# Patient Record
Sex: FEMALE | Race: OTHER | Employment: FULL TIME | URBAN - METROPOLITAN AREA
[De-identification: names, ages, dates, MRNs, and addresses within clinical notes are randomized per-mention and may not be internally consistent; named-entity substitution may affect disease eponyms.]

---

## 2017-02-05 DIAGNOSIS — E78.2 MIXED HYPERLIPIDEMIA: Primary | ICD-10-CM

## 2017-02-05 RX ORDER — OMEGA-3-ACID ETHYL ESTERS 1 G/1
2 CAPSULE, LIQUID FILLED ORAL 2 TIMES DAILY
Qty: 360 CAPSULE | Refills: 1 | Status: SHIPPED | OUTPATIENT
Start: 2017-02-05 | End: 2017-02-06 | Stop reason: SDUPTHER

## 2017-02-06 RX ORDER — OMEGA-3-ACID ETHYL ESTERS 1 G/1
2 CAPSULE, LIQUID FILLED ORAL 2 TIMES DAILY
Qty: 360 CAPSULE | Refills: 1 | Status: SHIPPED | OUTPATIENT
Start: 2017-02-06 | End: 2017-08-09 | Stop reason: SDUPTHER

## 2017-03-02 ENCOUNTER — TELEPHONE (OUTPATIENT)
Dept: FAMILY MEDICINE CLINIC | Age: 62
End: 2017-03-02

## 2017-03-02 DIAGNOSIS — E78.2 MIXED HYPERLIPIDEMIA: Primary | ICD-10-CM

## 2017-03-02 RX ORDER — PRAVASTATIN SODIUM 20 MG
20 TABLET ORAL EVERY EVENING
Qty: 90 TABLET | Refills: 1 | Status: SHIPPED | OUTPATIENT
Start: 2017-03-02 | End: 2017-08-25 | Stop reason: SDUPTHER

## 2017-03-28 ENCOUNTER — HOSPITAL ENCOUNTER (OUTPATIENT)
Dept: OTHER | Age: 62
Discharge: HOME | End: 2017-03-28
Attending: FAMILY MEDICINE | Admitting: FAMILY MEDICINE

## 2017-03-28 ENCOUNTER — OFFICE VISIT (OUTPATIENT)
Dept: FAMILY MEDICINE CLINIC | Age: 62
End: 2017-03-28

## 2017-03-28 VITALS
BODY MASS INDEX: 21.28 KG/M2 | HEIGHT: 67 IN | SYSTOLIC BLOOD PRESSURE: 100 MMHG | TEMPERATURE: 97.8 F | WEIGHT: 135.6 LBS | HEART RATE: 94 BPM | DIASTOLIC BLOOD PRESSURE: 68 MMHG | OXYGEN SATURATION: 95 %

## 2017-03-28 DIAGNOSIS — S89.91XA KNEE INJURY, RIGHT, INITIAL ENCOUNTER: Primary | ICD-10-CM

## 2017-03-28 DIAGNOSIS — S89.91XA KNEE INJURY, RIGHT, INITIAL ENCOUNTER: ICD-10-CM

## 2017-03-28 PROCEDURE — 99213 OFFICE O/P EST LOW 20 MIN: CPT | Performed by: NURSE PRACTITIONER

## 2017-03-28 RX ORDER — DOCUSATE SODIUM 100 MG/1
100 CAPSULE, LIQUID FILLED ORAL 2 TIMES DAILY
COMMUNITY

## 2017-03-28 ASSESSMENT — ENCOUNTER SYMPTOMS
RESPIRATORY NEGATIVE: 1
GASTROINTESTINAL NEGATIVE: 1
ALLERGIC/IMMUNOLOGIC NEGATIVE: 1
EYES NEGATIVE: 1

## 2017-04-24 DIAGNOSIS — E78.5 HYPERLIPIDEMIA, UNSPECIFIED HYPERLIPIDEMIA TYPE: ICD-10-CM

## 2017-04-24 DIAGNOSIS — E11.9 TYPE 2 DIABETES MELLITUS WITHOUT COMPLICATION, UNSPECIFIED LONG TERM INSULIN USE STATUS: ICD-10-CM

## 2017-04-24 LAB
ALBUMIN SERPL-MCNC: 4.1 G/DL (ref 3.4–5)
ANION GAP SERPL CALCULATED.3IONS-SCNC: 12 MMOL/L (ref 3–16)
BUN BLDV-MCNC: 18 MG/DL (ref 7–20)
CALCIUM SERPL-MCNC: 9 MG/DL (ref 8.3–10.6)
CHLORIDE BLD-SCNC: 102 MMOL/L (ref 99–110)
CHOLESTEROL, TOTAL: 242 MG/DL (ref 0–199)
CO2: 29 MMOL/L (ref 21–32)
CREAT SERPL-MCNC: <0.5 MG/DL (ref 0.6–1.1)
GFR AFRICAN AMERICAN: >60
GFR NON-AFRICAN AMERICAN: >60
GLUCOSE BLD-MCNC: 108 MG/DL (ref 70–99)
HDLC SERPL-MCNC: 100 MG/DL (ref 40–60)
LDL CHOLESTEROL CALCULATED: 123 MG/DL
PHOSPHORUS: 3.9 MG/DL (ref 2.5–4.9)
POTASSIUM SERPL-SCNC: 5.2 MMOL/L (ref 3.5–5.1)
SODIUM BLD-SCNC: 143 MMOL/L (ref 136–145)
TRIGL SERPL-MCNC: 93 MG/DL (ref 0–150)
VLDLC SERPL CALC-MCNC: 19 MG/DL

## 2017-04-25 LAB
ESTIMATED AVERAGE GLUCOSE: 116.9 MG/DL
HBA1C MFR BLD: 5.7 %

## 2017-04-26 ENCOUNTER — OFFICE VISIT (OUTPATIENT)
Dept: FAMILY MEDICINE CLINIC | Age: 62
End: 2017-04-26

## 2017-04-26 VITALS
TEMPERATURE: 98.1 F | WEIGHT: 138 LBS | BODY MASS INDEX: 21.66 KG/M2 | DIASTOLIC BLOOD PRESSURE: 70 MMHG | HEIGHT: 67 IN | SYSTOLIC BLOOD PRESSURE: 106 MMHG

## 2017-04-26 DIAGNOSIS — E11.9 TYPE 2 DIABETES MELLITUS WITHOUT COMPLICATION, WITHOUT LONG-TERM CURRENT USE OF INSULIN (HCC): ICD-10-CM

## 2017-04-26 DIAGNOSIS — E55.9 VITAMIN D DEFICIENCY: ICD-10-CM

## 2017-04-26 DIAGNOSIS — E78.2 MIXED HYPERLIPIDEMIA: ICD-10-CM

## 2017-04-26 DIAGNOSIS — M17.0 PRIMARY OSTEOARTHRITIS OF BOTH KNEES: Primary | ICD-10-CM

## 2017-04-26 PROCEDURE — 99214 OFFICE O/P EST MOD 30 MIN: CPT | Performed by: FAMILY MEDICINE

## 2017-04-26 ASSESSMENT — PATIENT HEALTH QUESTIONNAIRE - PHQ9
SUM OF ALL RESPONSES TO PHQ QUESTIONS 1-9: 0
2. FEELING DOWN, DEPRESSED OR HOPELESS: 0
1. LITTLE INTEREST OR PLEASURE IN DOING THINGS: 0
SUM OF ALL RESPONSES TO PHQ9 QUESTIONS 1 & 2: 0

## 2017-04-26 ASSESSMENT — ENCOUNTER SYMPTOMS
GASTROINTESTINAL NEGATIVE: 1
RESPIRATORY NEGATIVE: 1
EYES NEGATIVE: 1

## 2017-07-10 ENCOUNTER — OFFICE VISIT (OUTPATIENT)
Dept: GYNECOLOGY | Age: 62
End: 2017-07-10

## 2017-07-10 VITALS
DIASTOLIC BLOOD PRESSURE: 77 MMHG | BODY MASS INDEX: 23.14 KG/M2 | TEMPERATURE: 98 F | HEART RATE: 73 BPM | HEIGHT: 66 IN | WEIGHT: 144 LBS | SYSTOLIC BLOOD PRESSURE: 131 MMHG | RESPIRATION RATE: 17 BRPM

## 2017-07-10 DIAGNOSIS — Q51.28 DIDELPHIC UTERUS: ICD-10-CM

## 2017-07-10 DIAGNOSIS — N95.2 VAGINAL ATROPHY: ICD-10-CM

## 2017-07-10 DIAGNOSIS — Z01.419 WELL WOMAN EXAM WITH ROUTINE GYNECOLOGICAL EXAM: Primary | ICD-10-CM

## 2017-07-10 PROCEDURE — 99396 PREV VISIT EST AGE 40-64: CPT | Performed by: OBSTETRICS & GYNECOLOGY

## 2017-07-10 RX ORDER — ESTRADIOL 10 UG/1
10 INSERT VAGINAL
Qty: 24 TABLET | Refills: 3 | Status: SHIPPED | OUTPATIENT
Start: 2017-07-10 | End: 2018-04-20 | Stop reason: SDUPTHER

## 2017-07-10 ASSESSMENT — ENCOUNTER SYMPTOMS
RESPIRATORY NEGATIVE: 1
GASTROINTESTINAL NEGATIVE: 1
EYES NEGATIVE: 1

## 2017-07-12 LAB
HPV COMMENT: NORMAL
HPV TYPE 16: NOT DETECTED
HPV TYPE 18: NOT DETECTED
HPVOH (OTHER TYPES): NOT DETECTED

## 2017-08-09 DIAGNOSIS — E78.2 MIXED HYPERLIPIDEMIA: ICD-10-CM

## 2017-08-09 RX ORDER — OMEGA-3-ACID ETHYL ESTERS 1 G/1
CAPSULE, LIQUID FILLED ORAL
Qty: 360 CAPSULE | Refills: 1 | Status: SHIPPED | OUTPATIENT
Start: 2017-08-09 | End: 2018-03-23 | Stop reason: SDUPTHER

## 2017-08-25 DIAGNOSIS — E78.2 MIXED HYPERLIPIDEMIA: ICD-10-CM

## 2017-08-25 RX ORDER — PRAVASTATIN SODIUM 20 MG
20 TABLET ORAL DAILY
Qty: 90 TABLET | Refills: 1 | Status: SHIPPED | OUTPATIENT
Start: 2017-08-25 | End: 2018-03-04 | Stop reason: SDUPTHER

## 2017-08-28 DIAGNOSIS — E78.2 MIXED HYPERLIPIDEMIA: ICD-10-CM

## 2017-08-29 LAB
A/G RATIO: 2.2 (ref 1.1–2.2)
ALBUMIN SERPL-MCNC: 4.1 G/DL (ref 3.4–5)
ALP BLD-CCNC: 42 U/L (ref 40–129)
ALT SERPL-CCNC: 17 U/L (ref 10–40)
ANION GAP SERPL CALCULATED.3IONS-SCNC: 13 MMOL/L (ref 3–16)
AST SERPL-CCNC: 14 U/L (ref 15–37)
BILIRUB SERPL-MCNC: 0.4 MG/DL (ref 0–1)
BUN BLDV-MCNC: 23 MG/DL (ref 7–20)
CALCIUM SERPL-MCNC: 9.2 MG/DL (ref 8.3–10.6)
CHLORIDE BLD-SCNC: 97 MMOL/L (ref 99–110)
CO2: 29 MMOL/L (ref 21–32)
CREAT SERPL-MCNC: 0.5 MG/DL (ref 0.6–1.1)
GFR AFRICAN AMERICAN: >60
GFR NON-AFRICAN AMERICAN: >60
GLOBULIN: 1.9 G/DL
GLUCOSE BLD-MCNC: 106 MG/DL (ref 70–99)
POTASSIUM SERPL-SCNC: 4.3 MMOL/L (ref 3.5–5.1)
SODIUM BLD-SCNC: 139 MMOL/L (ref 136–145)
TOTAL PROTEIN: 6 G/DL (ref 6.4–8.2)

## 2017-09-13 ENCOUNTER — OFFICE VISIT (OUTPATIENT)
Dept: FAMILY MEDICINE CLINIC | Age: 62
End: 2017-09-13

## 2017-09-13 VITALS
SYSTOLIC BLOOD PRESSURE: 132 MMHG | HEIGHT: 67 IN | TEMPERATURE: 97.4 F | WEIGHT: 137.6 LBS | HEART RATE: 97 BPM | OXYGEN SATURATION: 98 % | BODY MASS INDEX: 21.6 KG/M2 | DIASTOLIC BLOOD PRESSURE: 76 MMHG

## 2017-09-13 DIAGNOSIS — K21.9 GASTROESOPHAGEAL REFLUX DISEASE, ESOPHAGITIS PRESENCE NOT SPECIFIED: Primary | ICD-10-CM

## 2017-09-13 DIAGNOSIS — E55.9 VITAMIN D DEFICIENCY: ICD-10-CM

## 2017-09-13 DIAGNOSIS — E78.2 MIXED HYPERLIPIDEMIA: ICD-10-CM

## 2017-09-13 DIAGNOSIS — E11.9 TYPE 2 DIABETES MELLITUS WITHOUT COMPLICATION, WITHOUT LONG-TERM CURRENT USE OF INSULIN (HCC): ICD-10-CM

## 2017-09-13 PROCEDURE — 99214 OFFICE O/P EST MOD 30 MIN: CPT | Performed by: NURSE PRACTITIONER

## 2017-09-13 RX ORDER — OMEPRAZOLE 20 MG/1
20 CAPSULE, DELAYED RELEASE ORAL DAILY
COMMUNITY

## 2017-09-13 ASSESSMENT — ENCOUNTER SYMPTOMS
STRIDOR: 0
HOARSE VOICE: 0
RESPIRATORY NEGATIVE: 1
CHOKING: 0
SORE THROAT: 0
WATER BRASH: 0
ABDOMINAL PAIN: 0
HEARTBURN: 1
NAUSEA: 0
ALLERGIC/IMMUNOLOGIC NEGATIVE: 1
GLOBUS SENSATION: 0
SHORTNESS OF BREATH: 0
BELCHING: 1
EYES NEGATIVE: 1
WHEEZING: 0
COUGH: 0

## 2017-09-25 ENCOUNTER — OFFICE VISIT (OUTPATIENT)
Dept: GYNECOLOGY | Age: 62
End: 2017-09-25

## 2017-09-25 VITALS
HEIGHT: 67 IN | WEIGHT: 136.6 LBS | SYSTOLIC BLOOD PRESSURE: 101 MMHG | HEART RATE: 67 BPM | DIASTOLIC BLOOD PRESSURE: 70 MMHG | BODY MASS INDEX: 21.44 KG/M2 | RESPIRATION RATE: 17 BRPM | TEMPERATURE: 97.1 F

## 2017-09-25 DIAGNOSIS — R10.2 PELVIC PAIN IN FEMALE: Primary | ICD-10-CM

## 2017-09-25 DIAGNOSIS — M62.838 LEVATOR SPASM: ICD-10-CM

## 2017-09-25 PROCEDURE — 99213 OFFICE O/P EST LOW 20 MIN: CPT | Performed by: OBSTETRICS & GYNECOLOGY

## 2017-09-25 RX ORDER — CYCLOBENZAPRINE HCL 10 MG
10 TABLET ORAL 2 TIMES DAILY PRN
Qty: 40 TABLET | Refills: 1 | Status: SHIPPED | OUTPATIENT
Start: 2017-09-25 | End: 2017-10-05

## 2017-12-12 ENCOUNTER — HOSPITAL ENCOUNTER (OUTPATIENT)
Dept: MAMMOGRAPHY | Age: 62
Discharge: HOME | End: 2017-12-12
Attending: FAMILY MEDICINE | Admitting: FAMILY MEDICINE

## 2017-12-12 DIAGNOSIS — Z12.31 VISIT FOR SCREENING MAMMOGRAM: ICD-10-CM

## 2018-01-05 ENCOUNTER — TELEPHONE (OUTPATIENT)
Dept: FAMILY MEDICINE CLINIC | Age: 63
End: 2018-01-05

## 2018-01-16 ENCOUNTER — OFFICE VISIT (OUTPATIENT)
Dept: FAMILY MEDICINE CLINIC | Age: 63
End: 2018-01-16

## 2018-01-16 VITALS
DIASTOLIC BLOOD PRESSURE: 78 MMHG | WEIGHT: 133 LBS | BODY MASS INDEX: 20.83 KG/M2 | TEMPERATURE: 97.5 F | SYSTOLIC BLOOD PRESSURE: 124 MMHG

## 2018-01-16 DIAGNOSIS — Z23 NEED FOR PNEUMOCOCCAL VACCINATION: ICD-10-CM

## 2018-01-16 DIAGNOSIS — E78.2 MIXED HYPERLIPIDEMIA: ICD-10-CM

## 2018-01-16 DIAGNOSIS — E11.9 TYPE 2 DIABETES MELLITUS WITHOUT COMPLICATION, WITHOUT LONG-TERM CURRENT USE OF INSULIN (HCC): ICD-10-CM

## 2018-01-16 DIAGNOSIS — E55.9 VITAMIN D DEFICIENCY: ICD-10-CM

## 2018-01-16 DIAGNOSIS — E11.9 TYPE 2 DIABETES MELLITUS WITHOUT COMPLICATION, WITHOUT LONG-TERM CURRENT USE OF INSULIN (HCC): Primary | ICD-10-CM

## 2018-01-16 LAB
ALBUMIN SERPL-MCNC: 4.6 G/DL (ref 3.4–5)
ANION GAP SERPL CALCULATED.3IONS-SCNC: 12 MMOL/L (ref 3–16)
BILIRUBIN, POC: NORMAL
BLOOD URINE, POC: NORMAL
BUN BLDV-MCNC: 26 MG/DL (ref 7–20)
CALCIUM SERPL-MCNC: 9.5 MG/DL (ref 8.3–10.6)
CHLORIDE BLD-SCNC: 98 MMOL/L (ref 99–110)
CHOLESTEROL, TOTAL: 223 MG/DL (ref 0–199)
CLARITY, POC: CLEAR
CO2: 30 MMOL/L (ref 21–32)
COLOR, POC: YELLOW
CREAT SERPL-MCNC: 0.5 MG/DL (ref 0.6–1.2)
CREATININE URINE POCT: NORMAL
ESTIMATED AVERAGE GLUCOSE: 119.8 MG/DL
GFR AFRICAN AMERICAN: >60
GFR NON-AFRICAN AMERICAN: >60
GLUCOSE BLD-MCNC: 93 MG/DL (ref 70–99)
GLUCOSE URINE, POC: NORMAL
HBA1C MFR BLD: 5.8 %
HDLC SERPL-MCNC: 94 MG/DL (ref 40–60)
KETONES, POC: NORMAL
LDL CHOLESTEROL CALCULATED: 118 MG/DL
LEUKOCYTE EST, POC: NORMAL
MICROALBUMIN/CREAT 24H UR: NORMAL MG/G{CREAT}
MICROALBUMIN/CREAT UR-RTO: NORMAL
NITRITE, POC: NORMAL
PH, POC: 7
PHOSPHORUS: 4.3 MG/DL (ref 2.5–4.9)
POTASSIUM SERPL-SCNC: 4.1 MMOL/L (ref 3.5–5.1)
PROTEIN, POC: NORMAL
SODIUM BLD-SCNC: 140 MMOL/L (ref 136–145)
SPECIFIC GRAVITY, POC: 1.01
TRIGL SERPL-MCNC: 55 MG/DL (ref 0–150)
UROBILINOGEN, POC: 0.2
VITAMIN D 25-HYDROXY: 51.3 NG/ML
VLDLC SERPL CALC-MCNC: 11 MG/DL

## 2018-01-16 PROCEDURE — 82044 UR ALBUMIN SEMIQUANTITATIVE: CPT | Performed by: FAMILY MEDICINE

## 2018-01-16 PROCEDURE — 81002 URINALYSIS NONAUTO W/O SCOPE: CPT | Performed by: FAMILY MEDICINE

## 2018-01-16 PROCEDURE — 90732 PPSV23 VACC 2 YRS+ SUBQ/IM: CPT | Performed by: FAMILY MEDICINE

## 2018-01-16 PROCEDURE — 90471 IMMUNIZATION ADMIN: CPT | Performed by: FAMILY MEDICINE

## 2018-01-16 PROCEDURE — 99214 OFFICE O/P EST MOD 30 MIN: CPT | Performed by: FAMILY MEDICINE

## 2018-01-16 ASSESSMENT — ENCOUNTER SYMPTOMS
GASTROINTESTINAL NEGATIVE: 1
RESPIRATORY NEGATIVE: 1
EYES NEGATIVE: 1

## 2018-01-17 NOTE — PROGRESS NOTES
Diagnosis Date    Acne rosacea 2011    Anxiety     Diabetes [facility]     Diabetic eye exam ([facility] .9.15    [facility]    Diabetic eye exam ([facility] 16    [facility]     Diverticulosis 2013    Hyperlipidemia     Insufficiency of tear film of both eyes 2016    Nuclear sclerosis of both eyes 2016    Pneumothorax     Pregnancy     grava - 2, para - 2, uterine rupture with stillbirth    Uterus didelphus      Past Surgical History:   Procedure Laterality Date     SECTION      2    COLONOSCOPY      WISDOM TOOTH EXTRACTION      x 4     Family History   Problem Relation Age of Onset    Stroke Mother     Glaucoma Mother     Cataracts Mother     High Cholesterol Mother     High Blood Pressure Mother     Heart Disease Mother     High Blood Pressure Father     Heart Disease Paternal Uncle     Cancer Paternal Uncle     Cancer Other     Stroke Maternal Uncle     Heart Disease Maternal Uncle     Mental Illness Maternal Grandmother     No Known Problems Sister     No Known Problems Brother     No Known Problems Maternal Aunt     No Known Problems Paternal Aunt     No Known Problems Maternal Grandfather     No Known Problems Paternal Grandmother     No Known Problems Paternal Grandfather     Rheum Arthritis Neg Hx     Osteoarthritis Neg Hx     Asthma Neg Hx     Breast Cancer Neg Hx     Diabetes Neg Hx     Heart Failure Neg Hx     Hypertension Neg Hx     Migraines Neg Hx     Ovarian Cancer Neg Hx     Rashes/Skin Problems Neg Hx     Seizures Neg Hx     Thyroid Disease Neg Hx      Social History     Social History    Marital status:      Spouse name: N/A    Number of children: N/A    Years of education: N/A     Occupational History    Not on file.      Social History Main Topics    Smoking status: Never Smoker    Smokeless tobacco: Never Used    Alcohol use No    Drug use: No    Sexual activity: Yes     Partners: Male     Other Topics Concern    Not on file     Social History Narrative    No narrative on file         Any recent diagnostic tests, hospital reports, office notes or consultation letters were reviewed prior to and during the visit. Review of Systems   Constitutional: Negative. HENT: Negative. Eyes: Negative. Respiratory: Negative. Cardiovascular: Negative. Gastrointestinal: Negative. Genitourinary: Negative. Musculoskeletal: Negative. Psychiatric/Behavioral: Negative. Physical Exam   Constitutional: She is oriented to person, place, and time. She appears well-developed and well-nourished. No distress. Neck: Normal range of motion. Neck supple. Normal carotid pulses, no hepatojugular reflux and no JVD present. Carotid bruit is not present. No tracheal deviation present. No thyromegaly present. Cardiovascular: Normal rate, regular rhythm, S1 normal, S2 normal, normal heart sounds and intact distal pulses. Exam reveals no gallop and no friction rub. No murmur heard. Pulses:       Carotid pulses are 2+ on the right side, and 2+ on the left side. Radial pulses are 2+ on the right side, and 2+ on the left side. Femoral pulses are 2+ on the right side, and 2+ on the left side. Popliteal pulses are 2+ on the right side, and 2+ on the left side. Dorsalis pedis pulses are 2+ on the right side, and 2+ on the left side. Posterior tibial pulses are 2+ on the right side, and 2+ on the left side. Pulmonary/Chest: Effort normal and breath sounds normal. No accessory muscle usage or stridor. No respiratory distress. She has no decreased breath sounds. She has no wheezes. She has no rhonchi. She has no rales. She exhibits no tenderness. Abdominal: Soft. Bowel sounds are normal. She exhibits no shifting dullness, no distension, no pulsatile liver, no abdominal bruit and no mass. There is no hepatosplenomegaly. There is no tenderness.  There is no rebound and no guarding. No hernia. Musculoskeletal:        Right ankle: She exhibits no swelling. Left ankle: She exhibits no swelling. Right foot: There is normal range of motion, no tenderness, no bony tenderness, no swelling, no crepitus and no deformity. Left foot: There is normal range of motion, no tenderness, no bony tenderness, no swelling and no deformity. Lymphadenopathy:     She has no cervical adenopathy. Neurological: She is oriented to person, place, and time. No sensory deficit. Filament test felt   Skin: She is not diaphoretic. Psychiatric: She has a normal mood and affect. Her behavior is normal. Judgment and thought content normal.         1. Type 2 diabetes mellitus without complication, without long-term current use of insulin (HCC)  Condition stable continue the medications, treatments, will check labs as appropriate         The patient received counseling on the following healthy behaviors: nutrition, exercise, medication adherence and diabetes control. Patient given educational materials on Diabetes as appropriate. I have instructed the patient to complete a self tracking handout on Blood Sugars  and instructed them to bring it with them to the next appointment. Discussed use, benefit, and side effects of prescribed medications. Barriers to medication compliance addressed. All patient questions answered. Pt voiced understanding. POCT microalbumin    POCT Urinalysis no Micro    [facility] DIABETES FOOT EXAM   2.  Mixed hyperlipidemia  Condition stable continue the medications, treatments, will check labs as appropriate       The patient received counseling on the following healthy behaviors: nutrition, exercise, medication adherence and decrease in alcohol consumption    Patient given educational materials on Hyperlipidemia and Nutrition if appropriate    I have instructed the patient to complete a self tracking handout on diet and instructed them to bring it with them

## 2018-03-04 DIAGNOSIS — E78.2 MIXED HYPERLIPIDEMIA: ICD-10-CM

## 2018-03-04 RX ORDER — PRAVASTATIN SODIUM 20 MG
20 TABLET ORAL DAILY
Qty: 90 TABLET | Refills: 1 | Status: SHIPPED | OUTPATIENT
Start: 2018-03-04 | End: 2018-09-14 | Stop reason: SDUPTHER

## 2018-03-23 DIAGNOSIS — E78.2 MIXED HYPERLIPIDEMIA: ICD-10-CM

## 2018-03-23 RX ORDER — OMEGA-3-ACID ETHYL ESTERS 1 G/1
2 CAPSULE, LIQUID FILLED ORAL 2 TIMES DAILY
Qty: 360 CAPSULE | Refills: 1 | Status: SHIPPED | OUTPATIENT
Start: 2018-03-23 | End: 2018-08-28 | Stop reason: SDUPTHER

## 2018-03-28 ENCOUNTER — OFFICE VISIT (OUTPATIENT)
Dept: FAMILY MEDICINE CLINIC | Age: 63
End: 2018-03-28

## 2018-03-28 VITALS
DIASTOLIC BLOOD PRESSURE: 70 MMHG | BODY MASS INDEX: 20.94 KG/M2 | HEIGHT: 67 IN | WEIGHT: 133.4 LBS | TEMPERATURE: 98.6 F | OXYGEN SATURATION: 98 % | SYSTOLIC BLOOD PRESSURE: 110 MMHG

## 2018-03-28 DIAGNOSIS — G47.26 SHIFT WORK SLEEP DISORDER: Primary | ICD-10-CM

## 2018-03-28 DIAGNOSIS — E78.2 MIXED HYPERLIPIDEMIA: ICD-10-CM

## 2018-03-28 DIAGNOSIS — E11.9 TYPE 2 DIABETES MELLITUS WITHOUT COMPLICATION, WITHOUT LONG-TERM CURRENT USE OF INSULIN (HCC): ICD-10-CM

## 2018-03-28 DIAGNOSIS — Z78.0 MENOPAUSE: ICD-10-CM

## 2018-03-28 PROCEDURE — 99214 OFFICE O/P EST MOD 30 MIN: CPT | Performed by: FAMILY MEDICINE

## 2018-03-28 ASSESSMENT — ENCOUNTER SYMPTOMS
GASTROINTESTINAL NEGATIVE: 1
EYES NEGATIVE: 1
RESPIRATORY NEGATIVE: 1

## 2018-03-28 NOTE — PROGRESS NOTES
History:   Diagnosis Date    Acne rosacea 2011    Anxiety     Diabetes mellitus ([facility]     Diabetic eye exam ([facility] [address] 4.9.15    [facility]    Diabetic eye exam ([facility] [address] [facility] 16    [facility]     Diverticulosis 2013    Hyperlipidemia     Insufficiency of tear film of both eyes 2016    Nuclear sclerosis of both eyes 2016    Pneumothorax     Pregnancy     grava - 2, para - 2, uterine rupture with stillbirth    Uterus didelphus      Past Surgical History:   Procedure Laterality Date     SECTION      2    COLONOSCOPY      WISDOM TOOTH EXTRACTION      x 4     Family History   Problem Relation Age of Onset    Stroke Mother     Glaucoma Mother     Cataracts Mother     High Cholesterol Mother     High Blood Pressure Mother     Heart Disease Mother     High Blood Pressure Father     Heart Disease Paternal Uncle     Cancer Paternal Uncle     Cancer Other     Stroke Maternal Uncle     Heart Disease Maternal Uncle     Mental Illness Maternal Grandmother     No Known Problems Sister     No Known Problems Brother     No Known Problems Maternal Aunt     No Known Problems Paternal Aunt     No Known Problems Maternal Grandfather     No Known Problems Paternal Grandmother     No Known Problems Paternal Grandfather     Rheum Arthritis Neg Hx     Osteoarthritis Neg Hx     Asthma Neg Hx     Breast Cancer Neg Hx     Diabetes Neg Hx     Heart Failure Neg Hx     Hypertension Neg Hx     Migraines Neg Hx     Ovarian Cancer Neg Hx     Rashes/Skin Problems Neg Hx     Seizures Neg Hx     Thyroid Disease Neg Hx      Social History     Social History    Marital status:      Spouse name: N/A    Number of children: N/A    Years of education: N/A     Occupational History    Not on file.      Social History Main Topics    Smoking status: Never Smoker    Smokeless tobacco: Never Used    Alcohol use No    Drug use: No    Sexual activity: Yes     Partners: Male

## 2018-04-09 ENCOUNTER — EMPLOYEE WELLNESS (OUTPATIENT)
Dept: OTHER | Age: 63
End: 2018-04-09

## 2018-04-09 LAB
CHOLESTEROL, TOTAL: 218 MG/DL (ref 0–199)
GLUCOSE BLD-MCNC: 94 MG/DL (ref 70–99)
HDLC SERPL-MCNC: 92 MG/DL (ref 40–60)
LDL CHOLESTEROL CALCULATED: 114 MG/DL
TRIGL SERPL-MCNC: 61 MG/DL (ref 0–150)

## 2018-04-16 VITALS — BODY MASS INDEX: 20.52 KG/M2 | WEIGHT: 131 LBS

## 2018-04-20 DIAGNOSIS — N95.2 VAGINAL ATROPHY: ICD-10-CM

## 2018-04-20 RX ORDER — ESTRADIOL 10 UG/1
10 INSERT VAGINAL
Qty: 24 TABLET | Refills: 3 | Status: SHIPPED | OUTPATIENT
Start: 2018-04-23 | End: 2018-08-28 | Stop reason: SDUPTHER

## 2018-04-30 ENCOUNTER — TELEPHONE (OUTPATIENT)
Dept: FAMILY MEDICINE CLINIC | Age: 63
End: 2018-04-30

## 2018-04-30 ENCOUNTER — NURSE TRIAGE (OUTPATIENT)
Dept: OTHER | Facility: CLINIC | Age: 63
End: 2018-04-30

## 2018-05-01 ENCOUNTER — OFFICE VISIT (OUTPATIENT)
Dept: FAMILY MEDICINE CLINIC | Age: 63
End: 2018-05-01

## 2018-05-01 ENCOUNTER — TELEPHONE (OUTPATIENT)
Dept: FAMILY MEDICINE CLINIC | Age: 63
End: 2018-05-01

## 2018-05-01 DIAGNOSIS — E11.9 TYPE 2 DIABETES MELLITUS WITHOUT COMPLICATION, WITHOUT LONG-TERM CURRENT USE OF INSULIN (HCC): ICD-10-CM

## 2018-05-01 DIAGNOSIS — R55 VASOVAGAL SYNCOPE: Primary | ICD-10-CM

## 2018-05-01 DIAGNOSIS — E87.6 HYPOKALEMIA: ICD-10-CM

## 2018-05-01 DIAGNOSIS — E83.42 HYPOMAGNESEMIA: ICD-10-CM

## 2018-05-01 PROCEDURE — 99213 OFFICE O/P EST LOW 20 MIN: CPT | Performed by: FAMILY MEDICINE

## 2018-05-01 RX ORDER — ONDANSETRON 4 MG/1
4 TABLET, FILM COATED ORAL EVERY 8 HOURS PRN
Qty: 30 TABLET | Refills: 1 | Status: SHIPPED | OUTPATIENT
Start: 2018-05-01 | End: 2020-05-07 | Stop reason: SDUPTHER

## 2018-05-01 ASSESSMENT — ENCOUNTER SYMPTOMS
EYES NEGATIVE: 1
GASTROINTESTINAL NEGATIVE: 1
RESPIRATORY NEGATIVE: 1

## 2018-05-02 LAB
ESTIMATED AVERAGE GLUCOSE: 111.2 MG/DL
HBA1C MFR BLD: 5.5 %
MAGNESIUM: 1.8 MG/DL (ref 1.8–2.4)
POTASSIUM SERPL-SCNC: 4.2 MMOL/L (ref 3.5–5.1)

## 2018-05-15 ENCOUNTER — OFFICE VISIT (OUTPATIENT)
Dept: FAMILY MEDICINE CLINIC | Age: 63
End: 2018-05-15

## 2018-05-15 VITALS
HEIGHT: 67 IN | DIASTOLIC BLOOD PRESSURE: 60 MMHG | HEART RATE: 80 BPM | OXYGEN SATURATION: 100 % | SYSTOLIC BLOOD PRESSURE: 110 MMHG | WEIGHT: 129.6 LBS | BODY MASS INDEX: 20.34 KG/M2 | TEMPERATURE: 98.6 F

## 2018-05-15 DIAGNOSIS — E78.2 MIXED HYPERLIPIDEMIA: Primary | ICD-10-CM

## 2018-05-15 DIAGNOSIS — E61.2 MAGNESIUM DEFICIENCY: ICD-10-CM

## 2018-05-15 PROBLEM — K21.9 GASTROESOPHAGEAL REFLUX DISEASE: Status: ACTIVE | Noted: 2018-05-15

## 2018-05-15 ASSESSMENT — ENCOUNTER SYMPTOMS
RESPIRATORY NEGATIVE: 1
SHORTNESS OF BREATH: 0
EYES NEGATIVE: 1
ALLERGIC/IMMUNOLOGIC NEGATIVE: 1
GASTROINTESTINAL NEGATIVE: 1

## 2018-06-20 DIAGNOSIS — Z12.11 ENCOUNTER FOR SCREENING COLONOSCOPY: Primary | ICD-10-CM

## 2018-08-16 ENCOUNTER — OFFICE VISIT (OUTPATIENT)
Dept: GYNECOLOGY | Age: 63
End: 2018-08-16

## 2018-08-16 VITALS
HEART RATE: 65 BPM | BODY MASS INDEX: 20.09 KG/M2 | DIASTOLIC BLOOD PRESSURE: 61 MMHG | WEIGHT: 128 LBS | RESPIRATION RATE: 17 BRPM | SYSTOLIC BLOOD PRESSURE: 110 MMHG | HEIGHT: 67 IN

## 2018-08-16 DIAGNOSIS — M85.88 OSTEOPENIA OF OTHER SITE: ICD-10-CM

## 2018-08-16 DIAGNOSIS — Z78.0 MENOPAUSE: ICD-10-CM

## 2018-08-16 DIAGNOSIS — Z01.419 WELL WOMAN EXAM WITH ROUTINE GYNECOLOGICAL EXAM: Primary | ICD-10-CM

## 2018-08-16 DIAGNOSIS — N95.2 VAGINAL ATROPHY: ICD-10-CM

## 2018-08-16 PROCEDURE — 99396 PREV VISIT EST AGE 40-64: CPT | Performed by: OBSTETRICS & GYNECOLOGY

## 2018-08-16 RX ORDER — ESTRADIOL 10 UG/1
10 INSERT VAGINAL NIGHTLY
Qty: 90 TABLET | Refills: 3 | Status: SHIPPED | OUTPATIENT
Start: 2018-08-16 | End: 2020-06-18 | Stop reason: SDUPTHER

## 2018-08-17 ASSESSMENT — ENCOUNTER SYMPTOMS
RESPIRATORY NEGATIVE: 1
EYES NEGATIVE: 1
GASTROINTESTINAL NEGATIVE: 1

## 2018-08-18 NOTE — PROGRESS NOTES
History Narrative    No narrative on file     No Known Allergies  Outpatient Prescriptions Marked as Taking for the 8/16/18 encounter (Office Visit) with Julio Valdivia MD   Medication Sig Dispense Refill    Estradiol (VAGIFEM) 10 MCG TABS vaginal tablet Place 1 tablet vaginally nightly 90 tablet 3    ondansetron (ZOFRAN) 4 MG tablet Take 1 tablet by mouth every 8 hours as needed for Nausea or Vomiting 30 tablet 1    Estradiol (VAGIFEM) 10 MCG TABS vaginal tablet Place 1 tablet vaginally Twice a Week 24 tablet 3    omega-3 acid ethyl esters (LOVAZA) 1 g capsule Take 2 capsules by mouth 2 times daily 360 capsule 1    pravastatin (PRAVACHOL) 20 MG tablet Take 1 tablet by mouth daily 90 tablet 1    Zoster Vac Recomb Adjuvanted (SHINGRIX) 50 MCG SUSR I dose im 1 each 1    omeprazole (PRILOSEC) 20 MG delayed release capsule Take 20 mg by mouth daily      docusate sodium (COLACE) 100 MG capsule Take 100 mg by mouth 2 times daily      Glucosamine-Chondroitin (GLUCOSAMINE CHONDR COMPLEX PO) Take by mouth      Cholecalciferol (VITAMIN D) 2000 UNITS CAPS capsule Take by mouth      Multiple Minerals-Vitamins (CALCIUM CITRATE PLUS PO) Take by mouth      Inulin (METAMUCIL CLEAR & NATURAL) POWD Take  by mouth.  MINOXIDIL, TOPICAL, 5 % SOLN Apply  topically.  naproxen (NAPROSYN) 250 MG tablet Take 500 mg by mouth 2 times daily (with meals)       Melatonin 3 MG TABS Take 3 mg by mouth daily.  Fiber Complete TABS Take 1 tablet by mouth Daily. [address] (TRI-BALANCE ORTHOT[employer] WOMENS) MISC by Does not apply route. As directed       multivitamin (THERAGRAN) per tablet Take 1 tablet by mouth daily.        Family History   Problem Relation Age of Onset    Stroke Mother     Glaucoma Mother     Cataracts Mother     High Cholesterol Mother     High Blood Pressure Mother     Heart Disease Mother     High Blood Pressure Father     Heart Disease Paternal Uncle     Cancer Paternal Uncle tenderness, lesion or injury on the right labia. There is no rash, tenderness, lesion or injury on the left labia. Uterus is not deviated, not enlarged, not fixed and not tender. Cervix exhibits no motion tenderness, no discharge and no friability. Right adnexum displays no mass, no tenderness and no fullness. Left adnexum displays no mass, no tenderness and no fullness. No erythema, tenderness or bleeding in the vagina. No foreign body in the vagina. No signs of injury around the vagina. No vaginal discharge found. Genitourinary Comments: Normal urethral meatus, nl urethra, nl bladder. Musculoskeletal: Normal range of motion. She exhibits no edema or tenderness. Lymphadenopathy:     She has no cervical adenopathy. Right: No inguinal adenopathy present. Left: No inguinal adenopathy present. Neurological: She is alert and oriented to person, place, and time. She has normal reflexes. Skin: Skin is warm and dry. No rash noted. She is not diaphoretic. No erythema. Psychiatric: She has a normal mood and affect. Her behavior is normal. Judgment and thought content normal.       Assessment:      1. Annual  2. Menopause  3. Vaginal atrophy  4. osteopenia      Plan:      1. Pap, calcium, exercise, mammogram, hemocult negative  2. See below  3. Vagifem  4.  Arslan London MD

## 2018-08-28 ENCOUNTER — OFFICE VISIT (OUTPATIENT)
Dept: FAMILY MEDICINE CLINIC | Age: 63
End: 2018-08-28

## 2018-08-28 VITALS
HEIGHT: 67 IN | SYSTOLIC BLOOD PRESSURE: 110 MMHG | WEIGHT: 128 LBS | BODY MASS INDEX: 20.09 KG/M2 | HEART RATE: 94 BPM | DIASTOLIC BLOOD PRESSURE: 78 MMHG | OXYGEN SATURATION: 97 %

## 2018-08-28 DIAGNOSIS — E78.2 MIXED HYPERLIPIDEMIA: Primary | ICD-10-CM

## 2018-08-28 DIAGNOSIS — E55.9 VITAMIN D DEFICIENCY: ICD-10-CM

## 2018-08-28 DIAGNOSIS — R73.03 PRE-DIABETES: ICD-10-CM

## 2018-08-28 PROCEDURE — 99214 OFFICE O/P EST MOD 30 MIN: CPT | Performed by: FAMILY MEDICINE

## 2018-08-28 RX ORDER — TRAZODONE HYDROCHLORIDE 50 MG/1
50 TABLET ORAL NIGHTLY PRN
Qty: 15 TABLET | Refills: 0 | Status: SHIPPED | OUTPATIENT
Start: 2018-08-28 | End: 2018-10-29 | Stop reason: SDUPTHER

## 2018-08-28 RX ORDER — OMEGA-3-ACID ETHYL ESTERS 1 G/1
2 CAPSULE, LIQUID FILLED ORAL 3 TIMES DAILY
Qty: 540 CAPSULE | Refills: 1 | Status: SHIPPED | OUTPATIENT
Start: 2018-08-28 | End: 2018-09-05 | Stop reason: SDUPTHER

## 2018-08-28 ASSESSMENT — PATIENT HEALTH QUESTIONNAIRE - PHQ9
SUM OF ALL RESPONSES TO PHQ QUESTIONS 1-9: 0
SUM OF ALL RESPONSES TO PHQ QUESTIONS 1-9: 0
1. LITTLE INTEREST OR PLEASURE IN DOING THINGS: 0
SUM OF ALL RESPONSES TO PHQ9 QUESTIONS 1 & 2: 0
2. FEELING DOWN, DEPRESSED OR HOPELESS: 0

## 2018-08-28 NOTE — PROGRESS NOTES
Mixed hyperlipidemia  Comprehensive Metabolic Panel    Lipid Panel    omega-3 acid ethyl esters (LOVAZA) 1 g capsule   2. Pre-diabetes  Hemoglobin A1C   3. Vitamin D deficiency  VITAMIN D 25 HYDROXY          Plan:      Nancy Ospina was seen today for new patient. Diagnoses and all orders for this visit:    Mixed hyperlipidemia  -     Comprehensive Metabolic Panel; Future  -     Lipid Panel; Future  -     omega-3 acid ethyl esters (LOVAZA) 1 g capsule; Take 2 capsules by mouth 3 times daily  Has been stable on current meds. Rechecking. Pre-diabetes  -     Hemoglobin A1C; Future  bloodwork ordered to make sure stable with diet and exercise  Vitamin D deficiency  -     VITAMIN D 25 HYDROXY; Future  Has been stable on otc. Other orders  -     traZODone (DESYREL) 50 MG tablet;  Take 1 tablet by mouth nightly as needed for Sleep (insomnia)             Sisi Kim MD

## 2018-08-29 ENCOUNTER — TELEPHONE (OUTPATIENT)
Dept: GYNECOLOGY | Age: 63
End: 2018-08-29

## 2018-08-30 DIAGNOSIS — E55.9 VITAMIN D DEFICIENCY: ICD-10-CM

## 2018-08-30 DIAGNOSIS — R73.03 PRE-DIABETES: ICD-10-CM

## 2018-08-30 DIAGNOSIS — E78.2 MIXED HYPERLIPIDEMIA: ICD-10-CM

## 2018-08-30 LAB
A/G RATIO: 2.1 (ref 1.1–2.2)
ALBUMIN SERPL-MCNC: 4.4 G/DL (ref 3.4–5)
ALP BLD-CCNC: 47 U/L (ref 40–129)
ALT SERPL-CCNC: 16 U/L (ref 10–40)
ANION GAP SERPL CALCULATED.3IONS-SCNC: 15 MMOL/L (ref 3–16)
AST SERPL-CCNC: 14 U/L (ref 15–37)
BILIRUB SERPL-MCNC: 0.4 MG/DL (ref 0–1)
BUN BLDV-MCNC: 26 MG/DL (ref 7–20)
CALCIUM SERPL-MCNC: 9.2 MG/DL (ref 8.3–10.6)
CHLORIDE BLD-SCNC: 96 MMOL/L (ref 99–110)
CHOLESTEROL, TOTAL: 213 MG/DL (ref 0–199)
CO2: 28 MMOL/L (ref 21–32)
CREAT SERPL-MCNC: 0.6 MG/DL (ref 0.6–1.2)
GFR AFRICAN AMERICAN: >60
GFR NON-AFRICAN AMERICAN: >60
GLOBULIN: 2.1 G/DL
GLUCOSE BLD-MCNC: 98 MG/DL (ref 70–99)
HDLC SERPL-MCNC: 81 MG/DL (ref 40–60)
LDL CHOLESTEROL CALCULATED: 119 MG/DL
POTASSIUM SERPL-SCNC: 4.1 MMOL/L (ref 3.5–5.1)
SODIUM BLD-SCNC: 139 MMOL/L (ref 136–145)
TOTAL PROTEIN: 6.5 G/DL (ref 6.4–8.2)
TRIGL SERPL-MCNC: 63 MG/DL (ref 0–150)
VITAMIN D 25-HYDROXY: 60.1 NG/ML
VLDLC SERPL CALC-MCNC: 13 MG/DL

## 2018-08-31 LAB
ESTIMATED AVERAGE GLUCOSE: 114 MG/DL
HBA1C MFR BLD: 5.6 %

## 2018-09-04 ENCOUNTER — TELEPHONE (OUTPATIENT)
Dept: FAMILY MEDICINE CLINIC | Age: 63
End: 2018-09-04

## 2018-09-04 NOTE — TELEPHONE ENCOUNTER
\"Due to triglycerides of 63 and the maximum dosage of this is 4 capsules per day, please confirm this dosage with Kim at [facility] mail order pharmacy\"    Please advise, current sig has patient taking 6 capsules a day. Thanks.

## 2018-09-04 NOTE — TELEPHONE ENCOUNTER
Medication   omega-3 acid ethyl esters (LOVAZA) 1 g capsule [[id number]]   omega-3 acid ethyl esters (LOVAZA) 1 g capsule [[id number]]   Order Details   Dose: 2 g Route: Oral Frequency: 3 TIMES DAILY   Dispense Quantity:  540 capsule Refills:  1 Fills remaining:  --           Sig: Take 2 capsules by mouth 3 times daily          Written Date:  18 Expiration Date:  19     Start Date:  18 End Date:  --     Ordering Provider:  -- Authorizing Provider:  Serena Bey MD Ordering User:  Serena Bey MD          Diagnosis Association: Mixed hyperlipidemia (E78.2)      Original Order:  omega-3 acid ethyl esters (LOVAZA) 1 g capsule [[id number]]      Pharmacy:  [address] [place] [address] [phone] - F [phone]      Pharmacy Comments:  --          Fill quantity remaining:  -- Fill quantity used:  --        Order Class:     Order Class   Normal [1]   Warnings History     Total number of overridden warnings: 1   Full Warnings History   Order Audit Trail     Number of times this order has been changed since signin   Order Audit Trail   omega-3 acid ethyl esters (LOVAZA) 1 g capsule   Date: 2018 Department: [facility] Ordering/Authorizing: Serena Bey MD   Medication Detail      Disp Refills Start End    omega-3 acid ethyl esters (LOVAZA) 1 g capsule 540 capsule 1 2018     Sig - Route:  Take 2 capsules by mouth 3 times daily - Oral    Sent to pharmacy as: Omega-3-acid Ethyl Esters 1 GM Oral Capsule    E-Prescribing Status: Receipt confirmed by pharmacy (2018  9:25 AM EDT)      Due to triglycerides of 63 and the maximum dosage of this is 4 capsules per day, please confirm this dosage with Chase Jefferson at [facility] mail order pharmacy

## 2018-09-05 DIAGNOSIS — E78.2 MIXED HYPERLIPIDEMIA: ICD-10-CM

## 2018-09-05 RX ORDER — OMEGA-3-ACID ETHYL ESTERS 1 G/1
2 CAPSULE, LIQUID FILLED ORAL 2 TIMES DAILY
Qty: 360 CAPSULE | Refills: 1 | Status: SHIPPED | OUTPATIENT
Start: 2018-09-05 | End: 2019-03-26 | Stop reason: SDUPTHER

## 2018-09-14 DIAGNOSIS — E78.2 MIXED HYPERLIPIDEMIA: ICD-10-CM

## 2018-09-14 RX ORDER — PRAVASTATIN SODIUM 20 MG
20 TABLET ORAL DAILY
Qty: 90 TABLET | Refills: 1 | Status: SHIPPED | OUTPATIENT
Start: 2018-09-14 | End: 2018-09-26 | Stop reason: SDUPTHER

## 2018-09-14 NOTE — TELEPHONE ENCOUNTER
Medication:   Requested Prescriptions     Pending Prescriptions Disp Refills    pravastatin (PRAVACHOL) 20 MG tablet 90 tablet 1     Sig: Take 1 tablet by mouth daily       Last Filled:  03.04.2018 #90 w/ 1 refill    Patient Phone Number: [phone] (home) [phone] (work)    Last appt: 8/28/2018   Next appt: Visit date not found    Last Lipid:   Lab Results   Component Value Date    CHOL 213 08/30/2018    TRIG 63 08/30/2018    HDL 81 08/30/2018    HDL 92 03/21/2012    [address] 119 08/30/2018       Last OARRS: No flowsheet data found.     Preferred Pharmacy:       [address] [place] [address] 57. [address] [address]  Phone: [phone] Fax: [phone]

## 2018-09-14 NOTE — TELEPHONE ENCOUNTER
90 day supply of Pravastatin refill needed.   Send to [address] [place] [address],[address] [employer] order)

## 2018-09-26 DIAGNOSIS — E78.2 MIXED HYPERLIPIDEMIA: ICD-10-CM

## 2018-09-26 RX ORDER — PRAVASTATIN SODIUM 20 MG
20 TABLET ORAL DAILY
Qty: 90 TABLET | Refills: 1 | Status: SHIPPED | OUTPATIENT
Start: 2018-09-26 | End: 2019-03-26 | Stop reason: SDUPTHER

## 2018-09-26 NOTE — TELEPHONE ENCOUNTER
Pravastatin cancelled at [address] in [employer].  Pending to [facility].

## 2018-09-26 NOTE — TELEPHONE ENCOUNTER
Pt needs pravastatin cancelled at [facility] and sent to [facility] on [address]   Medication originally sent 9/14  Medication pended to new pharmacy, but did not cancel at [facility]

## 2018-09-27 ENCOUNTER — OFFICE VISIT (OUTPATIENT)
Dept: FAMILY MEDICINE CLINIC | Age: 63
End: 2018-09-27
Payer: COMMERCIAL

## 2018-09-27 VITALS
SYSTOLIC BLOOD PRESSURE: 100 MMHG | HEART RATE: 73 BPM | BODY MASS INDEX: 20.4 KG/M2 | DIASTOLIC BLOOD PRESSURE: 64 MMHG | HEIGHT: 67 IN | WEIGHT: 130 LBS | OXYGEN SATURATION: 98 %

## 2018-09-27 DIAGNOSIS — H69.83 DYSFUNCTION OF BOTH EUSTACHIAN TUBES: ICD-10-CM

## 2018-09-27 DIAGNOSIS — H93.13 TINNITUS OF BOTH EARS: Primary | ICD-10-CM

## 2018-09-27 PROCEDURE — 99213 OFFICE O/P EST LOW 20 MIN: CPT | Performed by: FAMILY MEDICINE

## 2018-10-29 RX ORDER — TRAZODONE HYDROCHLORIDE 50 MG/1
50 TABLET ORAL NIGHTLY PRN
Qty: 90 TABLET | Refills: 0 | Status: SHIPPED | OUTPATIENT
Start: 2018-10-29 | End: 2019-07-25 | Stop reason: SDUPTHER

## 2018-10-29 NOTE — TELEPHONE ENCOUNTER
Patient needs a refill on;    traZODone (DESYREL) 50 MG tablet [[id number]]   Order Details   Dose: 50 mg Route: Oral Frequency: NIGHTLY PRN for Sleep, insomnia   Dispense Quantity:  15 tablet Refills:  0 Fills remaining:  --           Sig: Take 1 tablet by mouth nightly as needed for Sleep (insomnia)          Written Date:  08/28/18 Expiration Date:  08/28/19     Start Date:  08/28/18 End Date:  --     Ordering Provider:  -- Authorizing Provider:  Erendira Gilbert MD Ordering User:  Erendira Gilbert MD               Sent to [facility] in the hospital please.

## 2018-12-13 ENCOUNTER — HOSPITAL ENCOUNTER (OUTPATIENT)
Dept: MAMMOGRAPHY | Age: 63
Discharge: HOME | End: 2018-12-13
Payer: COMMERCIAL

## 2018-12-13 DIAGNOSIS — M85.88 OSTEOPENIA OF OTHER SITE: ICD-10-CM

## 2018-12-13 DIAGNOSIS — Z12.31 VISIT FOR SCREENING MAMMOGRAM: ICD-10-CM

## 2018-12-13 PROCEDURE — 77067 SCR MAMMO BI INCL CAD: CPT

## 2019-01-03 ENCOUNTER — HOSPITAL ENCOUNTER (OUTPATIENT)
Dept: GENERAL RADIOLOGY | Age: 64
Discharge: HOME | End: 2019-01-03
Payer: COMMERCIAL

## 2019-01-03 DIAGNOSIS — M85.88 OSTEOPENIA OF OTHER SITE: ICD-10-CM

## 2019-01-03 PROCEDURE — 77080 DXA BONE DENSITY AXIAL: CPT

## 2019-02-06 ENCOUNTER — OFFICE VISIT (OUTPATIENT)
Dept: FAMILY MEDICINE CLINIC | Age: 64
End: 2019-02-06
Payer: COMMERCIAL

## 2019-02-06 VITALS
HEART RATE: 87 BPM | SYSTOLIC BLOOD PRESSURE: 100 MMHG | WEIGHT: 123.4 LBS | RESPIRATION RATE: 12 BRPM | OXYGEN SATURATION: 100 % | HEIGHT: 67 IN | BODY MASS INDEX: 19.37 KG/M2 | DIASTOLIC BLOOD PRESSURE: 64 MMHG

## 2019-02-06 DIAGNOSIS — M54.5 ACUTE BILATERAL LOW BACK PAIN, WITH SCIATICA PRESENCE UNSPECIFIED: Primary | ICD-10-CM

## 2019-02-06 DIAGNOSIS — F33.0 MILD EPISODE OF RECURRENT MAJOR DEPRESSIVE DISORDER (HCC): ICD-10-CM

## 2019-02-06 DIAGNOSIS — M25.511 ACUTE PAIN OF RIGHT SHOULDER: ICD-10-CM

## 2019-02-06 PROCEDURE — 99214 OFFICE O/P EST MOD 30 MIN: CPT | Performed by: FAMILY MEDICINE

## 2019-02-06 RX ORDER — METHYLPREDNISOLONE 4 MG/1
TABLET ORAL
Qty: 21 TABLET | Refills: 0 | Status: SHIPPED | OUTPATIENT
Start: 2019-02-06 | End: 2019-02-12

## 2019-02-06 RX ORDER — PAROXETINE HYDROCHLORIDE 20 MG/1
20 TABLET, FILM COATED ORAL DAILY
Qty: 30 TABLET | Refills: 3 | Status: SHIPPED | OUTPATIENT
Start: 2019-02-06 | End: 2019-08-29 | Stop reason: ALTCHOICE

## 2019-02-06 ASSESSMENT — ENCOUNTER SYMPTOMS: RESPIRATORY NEGATIVE: 1

## 2019-03-26 ENCOUNTER — OFFICE VISIT (OUTPATIENT)
Dept: FAMILY MEDICINE CLINIC | Age: 64
End: 2019-03-26
Payer: COMMERCIAL

## 2019-03-26 VITALS
WEIGHT: 123 LBS | OXYGEN SATURATION: 97 % | BODY MASS INDEX: 19.3 KG/M2 | HEIGHT: 67 IN | HEART RATE: 70 BPM | DIASTOLIC BLOOD PRESSURE: 78 MMHG | SYSTOLIC BLOOD PRESSURE: 110 MMHG

## 2019-03-26 DIAGNOSIS — E78.2 MIXED HYPERLIPIDEMIA: ICD-10-CM

## 2019-03-26 DIAGNOSIS — L65.9 HAIR LOSS: ICD-10-CM

## 2019-03-26 DIAGNOSIS — L65.9 HAIR LOSS: Primary | ICD-10-CM

## 2019-03-26 LAB
TSH REFLEX: 1.29 UIU/ML (ref 0.27–4.2)
VITAMIN D 25-HYDROXY: 42.3 NG/ML

## 2019-03-26 PROCEDURE — 99213 OFFICE O/P EST LOW 20 MIN: CPT | Performed by: FAMILY MEDICINE

## 2019-03-26 RX ORDER — FINASTERIDE 1 MG/1
1 TABLET, FILM COATED ORAL DAILY
Qty: 90 TABLET | Refills: 3 | Status: SHIPPED | OUTPATIENT
Start: 2019-03-26 | End: 2019-08-29

## 2019-03-26 RX ORDER — OMEGA-3-ACID ETHYL ESTERS 1 G/1
2 CAPSULE, LIQUID FILLED ORAL 2 TIMES DAILY
Qty: 360 CAPSULE | Refills: 1 | Status: SHIPPED | OUTPATIENT
Start: 2019-03-26 | End: 2019-11-25 | Stop reason: SDUPTHER

## 2019-03-26 RX ORDER — PRAVASTATIN SODIUM 20 MG
20 TABLET ORAL DAILY
Qty: 90 TABLET | Refills: 1 | Status: SHIPPED | OUTPATIENT
Start: 2019-03-26 | End: 2019-10-08 | Stop reason: SDUPTHER

## 2019-04-05 ENCOUNTER — TELEPHONE (OUTPATIENT)
Dept: DERMATOLOGY | Age: 64
End: 2019-04-05

## 2019-05-20 ENCOUNTER — OFFICE VISIT (OUTPATIENT)
Dept: DERMATOLOGY | Age: 64
End: 2019-05-20
Payer: COMMERCIAL

## 2019-05-20 DIAGNOSIS — L82.1 SK (SEBORRHEIC KERATOSIS): ICD-10-CM

## 2019-05-20 DIAGNOSIS — L71.9 ROSACEA: ICD-10-CM

## 2019-05-20 DIAGNOSIS — L57.0 AK (ACTINIC KERATOSIS): ICD-10-CM

## 2019-05-20 DIAGNOSIS — L65.8 FEMALE PATTERN HAIR LOSS: Primary | ICD-10-CM

## 2019-05-20 PROCEDURE — 99213 OFFICE O/P EST LOW 20 MIN: CPT | Performed by: DERMATOLOGY

## 2019-05-20 NOTE — PROGRESS NOTES
On license of [facility]  Eddie Alejandre MD  [phone]      Vincent Orozco  1955    66 y.o. female     Date of Visit: 2019    Chief Complaint: hair loss, skin lesions. History of Present Illness:    1. F/u for female pattern hair loss - worse with stopping minoxidil. Better being back on it. C/o some associated skin irritation. 2.  She complains of few lesions on the trunk - none symptomatic. 3.  She reports a stable asymptomatic lesion on the forehead. Review of Systems:  Skin: No new or changing moles. Past Medical History, Family History, Surgical History, Medications and Allergies reviewed.     Past Medical History:   Diagnosis Date    Acne rosacea 2011    Anxiety     Diabetes mellitus ([address]     Diabetic eye exam ([address] 4.9.15    [facility]    Diabetic eye exam ([address] 16    [facility]     Diverticulosis 2013    Gastroesophageal reflux disease 5/15/2018    Hyperlipidemia     Insufficiency of tear film of both eyes 2016    Mild episode of recurrent major depressive disorder ([address] 2019    Nuclear sclerosis of both eyes 2016    Pneumothorax     Pregnancy     grava - 2, para - 2, uterine rupture with stillbirth    Type 2 diabetes mellitus without complication ([address]     Uterus didelphus      Past Surgical History:   Procedure Laterality Date     SECTION      2    COLONOSCOPY      WISDOM TOOTH EXTRACTION      x 4       No Known Allergies  Outpatient Medications Marked as Taking for the 19 encounter (Office Visit) with Tio Gibson MD   Medication Sig Dispense Refill    pravastatin (PRAVACHOL) 20 MG tablet Take 1 tablet by mouth daily 90 tablet 1    omega-3 acid ethyl esters (LOVAZA) 1 g capsule Take 2 capsules by mouth 2 times daily 360 capsule 1    finasteride (PROPECIA) 1 MG tablet Take 1 tablet by mouth daily 90 tablet 3    Glucosamine-Chondroitin (GLUCOSAMINE CHONDR COMPLEX PO) Take by mouth      PARoxetine (PAXIL) 20 MG tablet Take 1 tablet by mouth daily 30 tablet 3    traZODone (DESYREL) 50 MG tablet Take 1 tablet by mouth nightly as needed for Sleep (insomnia) 90 tablet 0    ondansetron (ZOFRAN) 4 MG tablet Take 1 tablet by mouth every 8 hours as needed for Nausea or Vomiting 30 tablet 1    omeprazole (PRILOSEC) 20 MG delayed release capsule Take 20 mg by mouth daily      docusate sodium (COLACE) 100 MG capsule Take 100 mg by mouth 2 times daily      Cholecalciferol (VITAMIN D) 2000 UNITS CAPS capsule Take by mouth      Multiple Minerals-Vitamins (CALCIUM CITRATE PLUS PO) Take by mouth      Inulin (METAMUCIL CLEAR & NATURAL) POWD Take  by mouth.  naproxen (NAPROSYN) 250 MG tablet Take 500 mg by mouth 2 times daily (with meals)       Melatonin 3 MG TABS Take 3 mg by mouth daily.  Fiber Complete TABS Take 1 tablet by mouth Daily. [address] (TRI-BALANCE ORTHOT[employer] WOMENS) MISC by Does not apply route. As directed       multivitamin (THERAGRAN) per tablet Take 1 tablet by mouth daily. Physical Examination       The following were examined and determined to be normal: Psych/Neuro, Conjunctivae/eyelids, Gums/teeth/lips, Neck, Breast/axilla/chest, Abdomen, Back, RUE, LUE, RLE, LLE and Nails/digits. The following were examined and determined to be abnormal: Scalp/hair and Head/face. Well-appearing. 1.  Crown/vertex with mild thinning of the scalp. .    2.  Trunk with stuck-on appearing tan-brown verrucous papules and plaques. 3.  Right upper forehead - scaly pink patch. 4.  Cheeks with moderate erythema and few telangiectasias. Assessment and Plan     1. Female pattern hair loss -     Rogaine 5% foam daily. 2. SK (seborrheic keratosis) - multiple    Reassurance. 3. AK (actinic keratosis) - small and asymptomatic    Observe. Continue sun protective behaviors.       4. Rosacea - erythematotelangiectatic Reassurance. Return in about 1 year (around 5/20/2020).

## 2019-07-01 ENCOUNTER — PATIENT MESSAGE (OUTPATIENT)
Dept: FAMILY MEDICINE CLINIC | Age: 64
End: 2019-07-01

## 2019-07-25 RX ORDER — TRAZODONE HYDROCHLORIDE 50 MG/1
100 TABLET ORAL NIGHTLY PRN
Qty: 180 TABLET | Refills: 0 | Status: SHIPPED | OUTPATIENT
Start: 2019-07-25 | End: 2020-05-07

## 2019-08-06 ENCOUNTER — TELEPHONE (OUTPATIENT)
Dept: FAMILY MEDICINE CLINIC | Age: 64
End: 2019-08-06

## 2019-08-29 ENCOUNTER — OFFICE VISIT (OUTPATIENT)
Dept: FAMILY MEDICINE CLINIC | Age: 64
End: 2019-08-29
Payer: COMMERCIAL

## 2019-08-29 VITALS
BODY MASS INDEX: 19.34 KG/M2 | OXYGEN SATURATION: 98 % | DIASTOLIC BLOOD PRESSURE: 78 MMHG | HEIGHT: 67 IN | HEART RATE: 73 BPM | WEIGHT: 123.2 LBS | SYSTOLIC BLOOD PRESSURE: 136 MMHG

## 2019-08-29 DIAGNOSIS — R73.03 PRE-DIABETES: ICD-10-CM

## 2019-08-29 DIAGNOSIS — Z23 NEED FOR PROPHYLACTIC VACCINATION AGAINST DIPHTHERIA-TETANUS-PERTUSSIS (DTP): ICD-10-CM

## 2019-08-29 DIAGNOSIS — Z12.11 SCREENING FOR COLON CANCER: ICD-10-CM

## 2019-08-29 DIAGNOSIS — M53.3 SACROILIAC JOINT PAIN: Primary | ICD-10-CM

## 2019-08-29 DIAGNOSIS — Z80.0 FAMILY HISTORY OF COLON CANCER: ICD-10-CM

## 2019-08-29 LAB
CREATININE URINE: 38.3 MG/DL (ref 28–259)
MICROALBUMIN UR-MCNC: <1.2 MG/DL
MICROALBUMIN/CREAT UR-RTO: NORMAL MG/G (ref 0–30)

## 2019-08-29 PROCEDURE — 90715 TDAP VACCINE 7 YRS/> IM: CPT | Performed by: NURSE PRACTITIONER

## 2019-08-29 PROCEDURE — 90471 IMMUNIZATION ADMIN: CPT | Performed by: NURSE PRACTITIONER

## 2019-08-29 PROCEDURE — 99213 OFFICE O/P EST LOW 20 MIN: CPT | Performed by: NURSE PRACTITIONER

## 2019-08-29 ASSESSMENT — ENCOUNTER SYMPTOMS
BACK PAIN: 1
SHORTNESS OF BREATH: 0

## 2019-08-29 NOTE — PROGRESS NOTES
colon cancer  Stable;  Due for colonoscopy. Referral made. - Espinoza Sheldon MD, Gastroenterology, [address] [place] [address] Dr    4. Family history of colon cancer  Stable;  See 3  - [facility] - Lorna Tomas MD, Gastroenterology, [address] [place] [address]     5. Need for prophylactic vaccination against diphtheria-tetanus-pertussis (DTP)  Stable;  Immunization counseling.  - Tdap (age 6y and older) IM (Boostrix)      Follow Up:     Return if symptoms worsen or fail to improve.

## 2019-08-29 NOTE — PATIENT INSTRUCTIONS
You may receive a survey regarding the care you received during your visit. Your input is valuable to us. We encourage you to complete and return your survey. We hope you will choose us in the future for your healthcare needs. Patient Education        Tdap (Tetanus, Diphtheria, Pertussis) Vaccine: What You Need to Know  Why get vaccinated? Tetanus, diphtheria, and pertussis are very serious diseases. Tdap vaccine can protect us from these diseases. And Tdap vaccine given to pregnant women can protect  babies against pertussis. Tetanus (lockjaw) is rare in the [facility] today. It causes painful muscle tightening and stiffness, usually all over the body. · It can lead to tightening of muscles in the head and neck so you can't open your mouth, swallow, or sometimes even breathe. Tetanus kills about 1 out of 10 people who are infected even after receiving the best medical care. Diphtheria is also rare in the [place] today. It can cause a thick coating to form in the back of the throat. · It can lead to breathing problems, heart failure, paralysis, and death. Pertussis (whooping cough) causes severe coughing spells, which can cause difficulty breathing, vomiting, and disturbed sleep. · It can also lead to weight loss, incontinence, and rib fractures. Up to 2 in 100 adolescents and 5 in 100 adults with pertussis are hospitalized or have complications, which could include pneumonia or death. These diseases are caused by bacteria. Diphtheria and pertussis are spread from person to person through secretions from coughing or sneezing. Tetanus enters the body through cuts, scratches, or wounds. Before vaccines, as many as 200,000 cases of diphtheria, 200,000 cases of pertussis, and hundreds of cases of tetanus were reported in the [place] each year. Since vaccination began, reports of cases for tetanus and diphtheria have dropped by about 99% and for pertussis by about 80%.   Tdap and go away on their own. Serious reactions are also possible but are rare. Most people who get Tdap vaccine do not have any problems with it. Mild problems following Tdap  (Did not interfere with activities)  · Pain where the shot was given (about 3 in 4 adolescents or 2 in 3 adults)  · Redness or swelling where the shot was given (about 1 person in 5)  · Mild fever of at least 100.4°F (up to about 1 in 25 adolescents or 1 in 100 adults)  · Headache (about 3 or 4 people in 10)  · Tiredness (about 1 person in 3 or 4)  · Nausea, vomiting, diarrhea, stomachache (up to 1 in 4 adolescents or 1 in 10 adults)  · Chills, sore joints (about 1 person in 10)  · Body aches (about 1 person in 3 or 4)  · Rash, swollen glands (uncommon)  Moderate problems following Tdap  (Interfered with activities, but did not require medical attention)  · Pain where the shot was given (up to 1 in 5 or 6)  · Redness or swelling where the shot was given (up to about 1 in 16 adolescents or 1 in 12 adults)  · Fever over 102°F (about 1 in 100 adolescents or 1 in 250 adults)  · Headache (about 1 in 7 adolescents or 1 in 10 adults)  · Nausea, vomiting, diarrhea, stomachache (up to 1 to 3 people in 100)  · Swelling of the entire arm where the shot was given (up to about 1 in 500)  Severe problems following Tdap  (Unable to perform usual activities; required medical attention)  · Swelling, severe pain, bleeding and redness in the arm where the shot was given (rare)  Problems that could happen after any vaccine:  · People sometimes faint after a medical procedure, including vaccination. Sitting or lying down for about 15 minutes can help prevent fainting, and injuries caused by a fall. Tell your doctor if you feel dizzy or have vision changes or ringing in the ears. · Some people get severe pain in the shoulder and have difficulty moving the arm where a shot was given. This happens very rarely. · Any medication can cause a severe allergic reaction. Control and Prevention (CDC):  ? Call 3-894.479.9728 (1-800-CDC-INFO) or  ? Visit CDC's website at www.cdc.gov/vaccines  Vaccine Information Statement (Interim)  Tdap Vaccine  (2/24/15)  42 JUAN CARLOS Doll Estimable 959XF-80  Department of Health and Human Services  Centers for Disease Control and Prevention  Many Vaccine Information Statements are available in [place] and other languages. See www.immunize.org/vis. Muchas hojas de información sobre vacunas están disponibles en español y en otros idiomas. Visite www.immunize.org/vis. Care instructions adapted under license by [facility] ([facility]). If you have questions about a medical condition or this instruction, always ask your healthcare professional. [address] any warranty or liability for your use of this information.

## 2019-10-08 DIAGNOSIS — E78.2 MIXED HYPERLIPIDEMIA: ICD-10-CM

## 2019-10-09 RX ORDER — PRAVASTATIN SODIUM 20 MG
20 TABLET ORAL DAILY
Qty: 90 TABLET | Refills: 1 | Status: SHIPPED | OUTPATIENT
Start: 2019-10-09 | End: 2020-05-07 | Stop reason: SDUPTHER

## 2019-11-04 ENCOUNTER — TELEPHONE (OUTPATIENT)
Dept: DERMATOLOGY | Age: 64
End: 2019-11-04

## 2019-11-08 ENCOUNTER — OFFICE VISIT (OUTPATIENT)
Dept: FAMILY MEDICINE CLINIC | Age: 64
End: 2019-11-08
Payer: COMMERCIAL

## 2019-11-08 VITALS
SYSTOLIC BLOOD PRESSURE: 126 MMHG | DIASTOLIC BLOOD PRESSURE: 62 MMHG | HEART RATE: 73 BPM | BODY MASS INDEX: 19.68 KG/M2 | HEIGHT: 67 IN | WEIGHT: 125.4 LBS | OXYGEN SATURATION: 99 %

## 2019-11-08 DIAGNOSIS — M85.89 OSTEOPENIA OF MULTIPLE SITES: ICD-10-CM

## 2019-11-08 DIAGNOSIS — Z13.1 SCREENING FOR DIABETES MELLITUS: ICD-10-CM

## 2019-11-08 DIAGNOSIS — E55.9 VITAMIN D DEFICIENCY: ICD-10-CM

## 2019-11-08 DIAGNOSIS — Z00.00 HEALTHCARE MAINTENANCE: Primary | ICD-10-CM

## 2019-11-08 DIAGNOSIS — E78.2 MIXED HYPERLIPIDEMIA: ICD-10-CM

## 2019-11-08 LAB
A/G RATIO: 2 (ref 1.1–2.2)
ALBUMIN SERPL-MCNC: 4.3 G/DL (ref 3.4–5)
ALP BLD-CCNC: 49 U/L (ref 40–129)
ALT SERPL-CCNC: 17 U/L (ref 10–40)
ANION GAP SERPL CALCULATED.3IONS-SCNC: 13 MMOL/L (ref 3–16)
AST SERPL-CCNC: 14 U/L (ref 15–37)
BILIRUB SERPL-MCNC: 0.4 MG/DL (ref 0–1)
BUN BLDV-MCNC: 20 MG/DL (ref 7–20)
CALCIUM SERPL-MCNC: 9.4 MG/DL (ref 8.3–10.6)
CHLORIDE BLD-SCNC: 99 MMOL/L (ref 99–110)
CHOLESTEROL, TOTAL: 216 MG/DL (ref 0–199)
CO2: 29 MMOL/L (ref 21–32)
CREAT SERPL-MCNC: <0.5 MG/DL (ref 0.6–1.2)
GFR AFRICAN AMERICAN: >60
GFR NON-AFRICAN AMERICAN: >60
GLOBULIN: 2.1 G/DL
GLUCOSE BLD-MCNC: 97 MG/DL (ref 70–99)
HDLC SERPL-MCNC: 91 MG/DL (ref 40–60)
LDL CHOLESTEROL CALCULATED: 116 MG/DL
MAGNESIUM: 1.9 MG/DL (ref 1.8–2.4)
POTASSIUM SERPL-SCNC: 4.2 MMOL/L (ref 3.5–5.1)
SODIUM BLD-SCNC: 141 MMOL/L (ref 136–145)
TOTAL PROTEIN: 6.4 G/DL (ref 6.4–8.2)
TRIGL SERPL-MCNC: 47 MG/DL (ref 0–150)
VITAMIN D 25-HYDROXY: 54.2 NG/ML
VLDLC SERPL CALC-MCNC: 9 MG/DL

## 2019-11-08 PROCEDURE — 99396 PREV VISIT EST AGE 40-64: CPT | Performed by: NURSE PRACTITIONER

## 2019-11-08 ASSESSMENT — ENCOUNTER SYMPTOMS
ABDOMINAL PAIN: 0
VOMITING: 0
RHINORRHEA: 0
DIARRHEA: 0
NAUSEA: 0
SHORTNESS OF BREATH: 0

## 2019-11-09 LAB
ESTIMATED AVERAGE GLUCOSE: 122.6 MG/DL
HBA1C MFR BLD: 5.9 %

## 2019-11-25 DIAGNOSIS — E78.2 MIXED HYPERLIPIDEMIA: ICD-10-CM

## 2019-11-25 RX ORDER — OMEGA-3-ACID ETHYL ESTERS 1 G/1
2 CAPSULE, LIQUID FILLED ORAL 2 TIMES DAILY
Qty: 360 CAPSULE | Refills: 1 | Status: SHIPPED | OUTPATIENT
Start: 2019-11-25 | End: 2020-08-03 | Stop reason: SDUPTHER

## 2019-12-06 ENCOUNTER — TELEPHONE (OUTPATIENT)
Dept: FAMILY MEDICINE CLINIC | Age: 64
End: 2019-12-06

## 2019-12-19 ENCOUNTER — HOSPITAL ENCOUNTER (OUTPATIENT)
Dept: MAMMOGRAPHY | Age: 64
Discharge: HOME | End: 2019-12-19
Payer: COMMERCIAL

## 2019-12-19 ENCOUNTER — OFFICE VISIT (OUTPATIENT)
Dept: GYNECOLOGY | Age: 64
End: 2019-12-19
Payer: COMMERCIAL

## 2019-12-19 VITALS
HEART RATE: 67 BPM | SYSTOLIC BLOOD PRESSURE: 138 MMHG | RESPIRATION RATE: 17 BRPM | HEIGHT: 67 IN | WEIGHT: 126.2 LBS | DIASTOLIC BLOOD PRESSURE: 76 MMHG | BODY MASS INDEX: 19.81 KG/M2

## 2019-12-19 DIAGNOSIS — Z01.419 WELL WOMAN EXAM WITH ROUTINE GYNECOLOGICAL EXAM: Primary | ICD-10-CM

## 2019-12-19 DIAGNOSIS — Z01.419 WELL WOMAN EXAM WITH ROUTINE GYNECOLOGICAL EXAM: ICD-10-CM

## 2019-12-19 DIAGNOSIS — R92.8 ABNORMAL FINDING ON MAMMOGRAPHY: ICD-10-CM

## 2019-12-19 PROCEDURE — 99396 PREV VISIT EST AGE 40-64: CPT | Performed by: OBSTETRICS & GYNECOLOGY

## 2019-12-19 PROCEDURE — G0279 TOMOSYNTHESIS, MAMMO: HCPCS

## 2019-12-19 PROCEDURE — 77067 SCR MAMMO BI INCL CAD: CPT

## 2019-12-19 ASSESSMENT — ENCOUNTER SYMPTOMS
GASTROINTESTINAL NEGATIVE: 1
RESPIRATORY NEGATIVE: 1
EYES NEGATIVE: 1

## 2019-12-24 DIAGNOSIS — N64.89 BREAST ASYMMETRY IN FEMALE: Primary | ICD-10-CM

## 2020-01-08 ENCOUNTER — HOSPITAL ENCOUNTER (OUTPATIENT)
Dept: ULTRASOUND IMAGING | Age: 65
Discharge: HOME | End: 2020-01-08
Payer: COMMERCIAL

## 2020-01-28 ENCOUNTER — TELEPHONE (OUTPATIENT)
Dept: GYNECOLOGY | Age: 65
End: 2020-01-28

## 2020-01-28 NOTE — TELEPHONE ENCOUNTER
Please call patient tomorrow morning EARLY (NOT TODAY) at [phone] re: confusion over her mammogram results. Dr. Garrett Platt wanted her to have a left diagnostic mammogram and possible US, but the [facility] told her that she didn't need it when she went to have it done. Patient states they told her \"other views were done. \"  Patient would like clarification.

## 2020-04-28 ENCOUNTER — TELEPHONE (OUTPATIENT)
Dept: FAMILY MEDICINE CLINIC | Age: 65
End: 2020-04-28

## 2020-04-28 DIAGNOSIS — Z00.00 WELL ADULT EXAM: ICD-10-CM

## 2020-04-28 DIAGNOSIS — E55.9 VITAMIN D DEFICIENCY: ICD-10-CM

## 2020-04-28 DIAGNOSIS — R73.03 PRE-DIABETES: Primary | ICD-10-CM

## 2020-04-28 DIAGNOSIS — E78.2 MIXED HYPERLIPIDEMIA: ICD-10-CM

## 2020-05-06 DIAGNOSIS — R73.03 PRE-DIABETES: ICD-10-CM

## 2020-05-06 DIAGNOSIS — E55.9 VITAMIN D DEFICIENCY: ICD-10-CM

## 2020-05-06 DIAGNOSIS — Z00.00 WELL ADULT EXAM: ICD-10-CM

## 2020-05-06 DIAGNOSIS — E78.2 MIXED HYPERLIPIDEMIA: ICD-10-CM

## 2020-05-06 LAB
A/G RATIO: 1.9 (ref 1.1–2.2)
ALBUMIN SERPL-MCNC: 4.4 G/DL (ref 3.4–5)
ALP BLD-CCNC: 41 U/L (ref 40–129)
ALT SERPL-CCNC: 17 U/L (ref 10–40)
ANION GAP SERPL CALCULATED.3IONS-SCNC: 12 MMOL/L (ref 3–16)
AST SERPL-CCNC: 16 U/L (ref 15–37)
BILIRUB SERPL-MCNC: 0.4 MG/DL (ref 0–1)
BUN BLDV-MCNC: 18 MG/DL (ref 7–20)
CALCIUM SERPL-MCNC: 9.4 MG/DL (ref 8.3–10.6)
CHLORIDE BLD-SCNC: 98 MMOL/L (ref 99–110)
CHOLESTEROL, TOTAL: 214 MG/DL (ref 0–199)
CO2: 29 MMOL/L (ref 21–32)
CREAT SERPL-MCNC: <0.5 MG/DL (ref 0.6–1.2)
ESTIMATED AVERAGE GLUCOSE: 114 MG/DL
GFR AFRICAN AMERICAN: >60
GFR NON-AFRICAN AMERICAN: >60
GLOBULIN: 2.3 G/DL
GLUCOSE BLD-MCNC: 102 MG/DL (ref 70–99)
HBA1C MFR BLD: 5.6 %
HDLC SERPL-MCNC: 99 MG/DL (ref 40–60)
LDL CHOLESTEROL CALCULATED: 105 MG/DL
POTASSIUM SERPL-SCNC: 3.9 MMOL/L (ref 3.5–5.1)
SODIUM BLD-SCNC: 139 MMOL/L (ref 136–145)
TOTAL PROTEIN: 6.7 G/DL (ref 6.4–8.2)
TRIGL SERPL-MCNC: 51 MG/DL (ref 0–150)
VITAMIN D 25-HYDROXY: 49 NG/ML
VLDLC SERPL CALC-MCNC: 10 MG/DL

## 2020-05-07 ENCOUNTER — VIRTUAL VISIT (OUTPATIENT)
Dept: FAMILY MEDICINE CLINIC | Age: 65
End: 2020-05-07
Payer: COMMERCIAL

## 2020-05-07 PROCEDURE — 99214 OFFICE O/P EST MOD 30 MIN: CPT | Performed by: FAMILY MEDICINE

## 2020-05-07 RX ORDER — ONDANSETRON 4 MG/1
4 TABLET, FILM COATED ORAL EVERY 8 HOURS PRN
Qty: 15 TABLET | Refills: 1 | Status: SHIPPED | OUTPATIENT
Start: 2020-05-07 | End: 2022-04-08 | Stop reason: SDUPTHER

## 2020-05-07 RX ORDER — ZOLPIDEM TARTRATE 5 MG/1
5 TABLET ORAL NIGHTLY PRN
Qty: 30 TABLET | Refills: 2 | Status: SHIPPED | OUTPATIENT
Start: 2020-05-07 | End: 2020-08-05

## 2020-05-07 RX ORDER — PRAVASTATIN SODIUM 20 MG
20 TABLET ORAL DAILY
Qty: 90 TABLET | Refills: 1 | Status: SHIPPED | OUTPATIENT
Start: 2020-05-07 | End: 2020-11-04 | Stop reason: SDUPTHER

## 2020-05-07 RX ORDER — TRAZODONE HYDROCHLORIDE 50 MG/1
100 TABLET ORAL NIGHTLY PRN
Qty: 180 TABLET | Refills: 0 | Status: CANCELLED | OUTPATIENT
Start: 2020-05-07

## 2020-05-07 ASSESSMENT — ENCOUNTER SYMPTOMS: RESPIRATORY NEGATIVE: 1

## 2020-05-07 NOTE — PROGRESS NOTES
2020    TELEHEALTH EVALUATION -- Audio/Visual (During YNOFN-44 public health emergency)    HPI:    Alfa CAMERON [address] (:  1955) has requested an audio/video evaluation for the following concern(s):    Chief Complaint   Patient presents with    6 Month Follow-Up     Still working. Diet has been good. Had an injury so hasn't been able to dance as much. Then had a foot infection. Had a hard time getting that cleared up but doing better now. Cholesterol has been stable on pravastatin. Does not sleep well w/o med but trazodone causes too much dryness. Really struggles with eye dryness. Review of Systems   Constitutional: Positive for fatigue. Respiratory: Negative. Prior to Visit Medications    Medication Sig Taking?  Authorizing Provider   Diclofenac Sodium POWD 2 g by Does not apply route 4 times daily Formula 5D Diclo 3% Prilo 2% Lido 2% Yes Kwan Wallace MD   omega-3 acid ethyl esters (LOVAZA) 1 g capsule Take 2 capsules by mouth 2 times daily Yes Kwan Wallace MD   CALCIUM PO Take by mouth Yes Historical Provider, MD   pravastatin (PRAVACHOL) 20 MG tablet Take 1 tablet by mouth daily Yes Kwan Wallace MD   MINOXIDIL, TOPICAL, 5 % SOLN Apply topically Yes Historical Provider, MD   traZODone (DESYREL) 50 MG tablet Take 2 tablets by mouth nightly as needed for Sleep (insomnia) Yes Emely Mcintyre MD   Estradiol (VAGIFEM) 10 MCG TABS vaginal tablet Place 1 tablet vaginally nightly Yes Leonarda Martin MD   ondansetron (ZOFRAN) 4 MG tablet Take 1 tablet by mouth every 8 hours as needed for Nausea or Vomiting Yes Kirti Mack MD   omeprazole (PRILOSEC) 20 MG delayed release capsule Take 20 mg by mouth daily Yes Historical Provider, MD   docusate sodium (COLACE) 100 MG capsule Take 100 mg by mouth 2 times daily Yes Historical Provider, MD   Cholecalciferol (VITAMIN D) 2000 UNITS CAPS capsule Take by mouth Yes Historical Provider, MD   Multiple Minerals-Vitamins Mouth/Throat: Mucous membranes are moist.     External Ears [] Normal  [] Abnormal-     Neck: [] No visualized mass     Pulmonary/Chest: [] Respiratory effort normal.  [] No visualized signs of difficulty breathing or respiratory distress        [] Abnormal-      Musculoskeletal:   [] Normal gait with no signs of ataxia         [] Normal range of motion of neck        [] Abnormal-       Neurological:        [] No Facial Asymmetry (Cranial nerve 7 motor function) (limited exam to video visit)          [] No gaze palsy        [] Abnormal-         Skin:        [] No significant exanthematous lesions or discoloration noted on facial skin         [] Abnormal-            Psychiatric:       [x] Normal Affect [] No Hallucinations        [] Abnormal-     Other pertinent observable physical exam findings-     ASSESSMENT/PLAN:  1. Mixed hyperlipidemia  well controlled on statin  - pravastatin (PRAVACHOL) 20 MG tablet; Take 1 tablet by mouth daily  Dispense: 90 tablet; Refill: 1    2. Insomnia, unspecified type  Not well controlled  Will try ambien since other options will cause more eye dryness  Medication side affects and adverse reactions reviewed. Cautioned not to take nightly due to potential dependence  - zolpidem (AMBIEN) 5 MG tablet; Take 1 tablet by mouth nightly as needed for Sleep for up to 90 days. Dispense: 30 tablet; Refill: 2      No follow-ups on file. Alfa Qureshi is a 60 y.o. female being evaluated by a Virtual Visit (video visit) encounter to address concerns as mentioned above. A caregiver was present when appropriate. Due to this being a TeleHealth encounter (During XBWIZ-48 public health emergency), evaluation of the following organ systems was limited: Vitals/Constitutional/EENT/Resp/CV/GI//MS/Neuro/Skin/Heme-Lymph-Imm.   Pursuant to the emergency declaration under the [facility] [place] waiver authority and the [place] [employer]

## 2020-05-08 ENCOUNTER — TELEPHONE (OUTPATIENT)
Dept: FAMILY MEDICINE CLINIC | Age: 65
End: 2020-05-08

## 2020-05-10 LAB
3-OH-COTININE: <2 NG/ML
COTININE: <2 NG/ML
NICOTINE: <2 NG/ML

## 2020-05-18 ENCOUNTER — TELEPHONE (OUTPATIENT)
Dept: DERMATOLOGY | Age: 65
End: 2020-05-18

## 2020-05-18 NOTE — TELEPHONE ENCOUNTER
Patient's last OV 5/2019-AK on right upper forehead. Patient would like to know when she can schedule to have LN2 on this area? Some time this summer?

## 2020-05-18 NOTE — TELEPHONE ENCOUNTER
The patient is returning a call about rescheduling her missed appointment that was cancelled  For May 19th. This is for 1 yr skin check plus spots. She has a spot on forehead she wants removed. Please call to schedule, she said Dr. Nessa Anthony told her it was a pre cancer and she wants it frozen off.      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

## 2020-06-01 ENCOUNTER — OFFICE VISIT (OUTPATIENT)
Dept: DERMATOLOGY | Age: 65
End: 2020-06-01
Payer: COMMERCIAL

## 2020-06-01 VITALS — TEMPERATURE: 97.4 F

## 2020-06-01 PROCEDURE — 17000 DESTRUCT PREMALG LESION: CPT | Performed by: DERMATOLOGY

## 2020-06-01 PROCEDURE — 99213 OFFICE O/P EST LOW 20 MIN: CPT | Performed by: DERMATOLOGY

## 2020-06-01 NOTE — PROGRESS NOTES
with a greasy scaly erythematous patch. Assessment and Plan     1. Actinic keratosis     2 cycles of liquid nitrogen applied to 1 AK on the forehead. Patient was educated regarding the potential risks of blister formation, discomfort, hypopigmentation, and scar. Wound care was discussed. 2. SK (seborrheic keratosis)     Reassurance. 3. Multiple nevi - benign appearing    Sun protective behaviors and self skin examinations were encouraged. Call for any new or concerning lesions. 4. Irritant dermatitis of the lateral portions of the chest -     Triamcinolone 0.1% cream twice daily for up to 2 weeks or until improved. (She has some at home). 5. Seborrheic dermatitis of the face - mild    OTC hydrocortisone cream twice daily as needed. Return in about 1 year (around 6/1/2021).

## 2020-06-18 ENCOUNTER — TELEPHONE (OUTPATIENT)
Dept: GYNECOLOGY | Age: 65
End: 2020-06-18

## 2020-06-18 RX ORDER — ESTRADIOL 10 UG/1
10 INSERT VAGINAL
Qty: 24 TABLET | Refills: 3 | Status: SHIPPED | OUTPATIENT
Start: 2020-06-18 | End: 2022-05-10 | Stop reason: SDUPTHER

## 2020-06-18 NOTE — TELEPHONE ENCOUNTER
Patient requested refill on Estradial 10 mcg  Patient can be reached at [facility], [address] [place] [address] [place] [facility] [phone] - F [phone]

## 2020-07-27 ENCOUNTER — OFFICE VISIT (OUTPATIENT)
Dept: PRIMARY CARE CLINIC | Age: 65
End: 2020-07-27
Payer: COMMERCIAL

## 2020-07-27 PROCEDURE — 99211 OFF/OP EST MAY X REQ PHY/QHP: CPT | Performed by: NURSE PRACTITIONER

## 2020-07-27 NOTE — PATIENT INSTRUCTIONS
You have received a viral test for COVID-19. Below is education on quarantine per the CDC guidelines. For any symptoms, seek care from your PCP, call [phone] to establish care with a doctor, or go directly to an urgent care or the emergency room. Test results will take 2-7 days and will be sent to you in your [employer] account. If you test positive, you will be contacted via phone. If you test negative, the ONLY communication will be through [address] GO TO Support Your App AND SIGN UP FOR [employer]  (LOWER LEFT OF THE HOME PAGE)  No test is 100%. If you have symptoms, you should follow the guidance of quarantine as previously stated. You can still be contagious if you have symptoms. Your [facility] will reach out to you if you have a positive result. They will provide you with a return to work date and note. If you were tested for a pre-op, then you should remain in quarantine until your procedure. How do I know if I need to be in quarantine? If you live in a community where COVID-19 is or might be spreading (currently, that is virtually everywhere in the [place])  Be alert for symptoms. Watch for fever, cough, shortness of breath, or other symptoms of COVID-19.  Take your temperature if symptoms develop.  Practice social distancing. Maintain 6 feet of distance from others and stay out of crowded places.  Follow CDC guidance if symptoms develop. If you feel healthy but:   Recently had close contact with a person with COVID-19 you need to Quarantine:   Stay home until 14 days after your last exposure.  Check your temperature twice a day and watch for symptoms of COVID-19.  If possible, stay away from people who are at higher-risk for getting very sick from COVID-19.   Stay Home and Monitor Your Health if you:   Have been diagnosed with COVID-19, or   Are waiting for test results, or   Have cough, fever, or shortness of breath, or symptoms of COVID-19      When You Can

## 2020-07-28 LAB
REPORT: NORMAL
SARS-COV-2: NOT DETECTED
THIS TEST SENT TO: NORMAL

## 2020-07-31 ENCOUNTER — HOSPITAL ENCOUNTER (OUTPATIENT)
Age: 65
Setting detail: OUTPATIENT SURGERY
Discharge: HOME | End: 2020-07-31
Attending: INTERNAL MEDICINE | Admitting: INTERNAL MEDICINE
Payer: COMMERCIAL

## 2020-07-31 ENCOUNTER — ANESTHESIA (OUTPATIENT)
Dept: ENDOSCOPY | Age: 65
End: 2020-07-31
Payer: COMMERCIAL

## 2020-07-31 ENCOUNTER — ANESTHESIA EVENT (OUTPATIENT)
Dept: ENDOSCOPY | Age: 65
End: 2020-07-31
Payer: COMMERCIAL

## 2020-07-31 VITALS
TEMPERATURE: 98.5 F | HEIGHT: 67 IN | OXYGEN SATURATION: 97 % | RESPIRATION RATE: 18 BRPM | SYSTOLIC BLOOD PRESSURE: 141 MMHG | WEIGHT: 124 LBS | HEART RATE: 77 BPM | DIASTOLIC BLOOD PRESSURE: 78 MMHG | BODY MASS INDEX: 19.46 KG/M2

## 2020-07-31 VITALS
DIASTOLIC BLOOD PRESSURE: 63 MMHG | RESPIRATION RATE: 16 BRPM | SYSTOLIC BLOOD PRESSURE: 102 MMHG | OXYGEN SATURATION: 100 %

## 2020-07-31 PROCEDURE — 2580000003 HC RX 258: Performed by: ANESTHESIOLOGY

## 2020-07-31 PROCEDURE — 2709999900 HC NON-CHARGEABLE SUPPLY: Performed by: INTERNAL MEDICINE

## 2020-07-31 PROCEDURE — 3609010600 HC COLONOSCOPY POLYPECTOMY SNARE/COLD BIOPSY: Performed by: INTERNAL MEDICINE

## 2020-07-31 PROCEDURE — 3700000001 HC ADD 15 MINUTES (ANESTHESIA): Performed by: INTERNAL MEDICINE

## 2020-07-31 PROCEDURE — 6370000000 HC RX 637 (ALT 250 FOR IP): Performed by: INTERNAL MEDICINE

## 2020-07-31 PROCEDURE — 6360000002 HC RX W HCPCS: Performed by: NURSE ANESTHETIST, CERTIFIED REGISTERED

## 2020-07-31 PROCEDURE — 7100000010 HC PHASE II RECOVERY - FIRST 15 MIN: Performed by: INTERNAL MEDICINE

## 2020-07-31 PROCEDURE — 2500000003 HC RX 250 WO HCPCS: Performed by: NURSE ANESTHETIST, CERTIFIED REGISTERED

## 2020-07-31 PROCEDURE — 7100000011 HC PHASE II RECOVERY - ADDTL 15 MIN: Performed by: INTERNAL MEDICINE

## 2020-07-31 PROCEDURE — 88305 TISSUE EXAM BY PATHOLOGIST: CPT

## 2020-07-31 PROCEDURE — 2500000003 HC RX 250 WO HCPCS: Performed by: INTERNAL MEDICINE

## 2020-07-31 PROCEDURE — 3700000000 HC ANESTHESIA ATTENDED CARE: Performed by: INTERNAL MEDICINE

## 2020-07-31 RX ORDER — LIDOCAINE HYDROCHLORIDE 20 MG/ML
INJECTION, SOLUTION EPIDURAL; INFILTRATION; INTRACAUDAL; PERINEURAL PRN
Status: DISCONTINUED | OUTPATIENT
Start: 2020-07-31 | End: 2020-07-31 | Stop reason: SDUPTHER

## 2020-07-31 RX ORDER — ACETAMINOPHEN 500 MG
500 TABLET ORAL EVERY 6 HOURS PRN
COMMUNITY
End: 2022-06-01

## 2020-07-31 RX ORDER — M-VIT,TX,IRON,MINS/CALC/FOLIC 27MG-0.4MG
1 TABLET ORAL DAILY
COMMUNITY

## 2020-07-31 RX ORDER — SODIUM CHLORIDE 9 MG/ML
INJECTION, SOLUTION INTRAVENOUS CONTINUOUS
Status: DISCONTINUED | OUTPATIENT
Start: 2020-07-31 | End: 2020-07-31 | Stop reason: HOSPADM

## 2020-07-31 RX ORDER — MULTIVIT-MIN/IRON/FOLIC ACID/K 18-600-40
CAPSULE ORAL 2 TIMES DAILY
COMMUNITY
End: 2020-11-04 | Stop reason: SDUPTHER

## 2020-07-31 RX ORDER — PROPOFOL 10 MG/ML
INJECTION, EMULSION INTRAVENOUS PRN
Status: DISCONTINUED | OUTPATIENT
Start: 2020-07-31 | End: 2020-07-31 | Stop reason: SDUPTHER

## 2020-07-31 RX ORDER — BIOTIN 2500 MCG
CAPSULE ORAL DAILY
COMMUNITY

## 2020-07-31 RX ADMIN — SODIUM CHLORIDE: 9 INJECTION, SOLUTION INTRAVENOUS at 11:17

## 2020-07-31 RX ADMIN — PROPOFOL 40 MG: 10 INJECTION, EMULSION INTRAVENOUS at 12:09

## 2020-07-31 RX ADMIN — LIDOCAINE HYDROCHLORIDE 20 MG: 20 INJECTION, SOLUTION EPIDURAL; INFILTRATION; INTRACAUDAL; PERINEURAL at 12:18

## 2020-07-31 RX ADMIN — PROPOFOL 120 MG: 10 INJECTION, EMULSION INTRAVENOUS at 11:56

## 2020-07-31 RX ADMIN — LIDOCAINE HYDROCHLORIDE 60 MG: 20 INJECTION, SOLUTION EPIDURAL; INFILTRATION; INTRACAUDAL; PERINEURAL at 11:56

## 2020-07-31 RX ADMIN — PROPOFOL 40 MG: 10 INJECTION, EMULSION INTRAVENOUS at 12:04

## 2020-07-31 RX ADMIN — LIDOCAINE HYDROCHLORIDE 20 MG: 20 INJECTION, SOLUTION EPIDURAL; INFILTRATION; INTRACAUDAL; PERINEURAL at 12:13

## 2020-07-31 RX ADMIN — LIDOCAINE HYDROCHLORIDE 20 MG: 20 INJECTION, SOLUTION EPIDURAL; INFILTRATION; INTRACAUDAL; PERINEURAL at 12:04

## 2020-07-31 RX ADMIN — LIDOCAINE HYDROCHLORIDE 20 MG: 20 INJECTION, SOLUTION EPIDURAL; INFILTRATION; INTRACAUDAL; PERINEURAL at 12:00

## 2020-07-31 RX ADMIN — PROPOFOL 40 MG: 10 INJECTION, EMULSION INTRAVENOUS at 12:18

## 2020-07-31 RX ADMIN — LIDOCAINE HYDROCHLORIDE 20 MG: 20 INJECTION, SOLUTION EPIDURAL; INFILTRATION; INTRACAUDAL; PERINEURAL at 12:09

## 2020-07-31 RX ADMIN — PROPOFOL 40 MG: 10 INJECTION, EMULSION INTRAVENOUS at 12:13

## 2020-07-31 RX ADMIN — PROPOFOL 40 MG: 10 INJECTION, EMULSION INTRAVENOUS at 12:00

## 2020-07-31 ASSESSMENT — PAIN SCALES - GENERAL
PAINLEVEL_OUTOF10: 0

## 2020-07-31 ASSESSMENT — LIFESTYLE VARIABLES: SMOKING_STATUS: 0

## 2020-07-31 ASSESSMENT — PAIN - FUNCTIONAL ASSESSMENT: PAIN_FUNCTIONAL_ASSESSMENT: 0-10

## 2020-07-31 NOTE — ANESTHESIA POSTPROCEDURE EVALUATION
Department of Anesthesiology  Postprocedure Note    Patient: Manda Rao  MRN: [id number]  YOB: 1955  Date of evaluation: 7/31/2020  Time:  12:40 PM     Procedure Summary     Date:  07/31/20 Room / Location:  [address] [place] [address]    Anesthesia Start:  1152 Anesthesia Stop:  1226    Procedure:  COLONOSCOPY POLYPECTOMY SNARE/COLD BIOPSY (N/A ) Diagnosis:  (HISTORY OF COLON POLYPS Z86.010)    Surgeon:  Kar Diamond MD Responsible Provider:  Angel Fine MD    Anesthesia Type:  MAC ASA Status:  2          Anesthesia Type: MAC    Loretta Phase I: Loretta Score: 10    Loretta Phase II:      Last vitals: Reviewed and per EMR flowsheets.        Anesthesia Post Evaluation    Patient location during evaluation: PACU  Patient participation: complete - patient participated  Level of consciousness: awake  Airway patency: patent  Nausea & Vomiting: no vomiting  Complications: no  Cardiovascular status: hemodynamically stable  Respiratory status: acceptable  Hydration status: euvolemic

## 2020-07-31 NOTE — H&P
[address] [place] [address]    Pre-operative History and Physical    Patient: Ayo Whitaker  : 1955  CSN:     History Obtained From:   Patient or guardian. HISTORY OF PRESENT ILLNESS:    The patient is a 60 y.o. female here for Endoscopy. Past Medical History:    Past Medical History:   Diagnosis Date    Acne rosacea 2011    Anxiety     Colon polyps     Diabetes mellitus ([address]     pre-diabetic (diet controlled)    Diabetic eye exam ([address] 4..15    [facility]    Diabetic eye exam ([address] 16    [facility]     Diverticulosis 2013    Dry eyes     Gastroesophageal reflux disease 5/15/2018    Hyperlipidemia     Insufficiency of tear film of both eyes 2016    Mild episode of recurrent major depressive disorder ([address] 2019    Nuclear sclerosis of both eyes 2016    Pneumothorax     Pregnancy     grava - 2, para - 2, uterine rupture with stillbirth    Uterus didelphus      Past Surgical History:    Past Surgical History:   Procedure Laterality Date     SECTION      2    COLONOSCOPY      VAGINA SURGERY      septum removed    WISDOM TOOTH EXTRACTION      x 4     Medications Prior to Admission:   No current facility-administered medications on file prior to encounter.       Current Outpatient Medications on File Prior to Encounter   Medication Sig Dispense Refill    ARTIFICIAL TEAR OP Apply to eye as needed      cycloSPORINE (RESTASIS OP) Apply 1 drop to eye 2 times daily Each eye      ondansetron (ZOFRAN) 4 MG tablet Take 1 tablet by mouth every 8 hours as needed for Nausea or Vomiting 15 tablet 1    MINOXIDIL, TOPICAL, 5 % SOLN Apply topically daily       Calcium-Magnesium-Vitamin D (CALCIUM 1200+D3 PO) Take by mouth 2 times daily      Calcium Carbonate Antacid (TUMS ULTRA 1000 PO) Take by mouth as needed      LACTOBACILLUS PROBIOTIC PO Take by mouth every evening      NAPROXEN PO Take 220 mg by mouth 2 times daily      Biotin 2500 MCG CAPS Take by mouth daily      acetaminophen (TYLENOL) 500 MG tablet Take 500 mg by mouth every 6 hours as needed for Pain      Vitamin D, Cholecalciferol, 25 MCG (1000 UT) TABS Take by mouth 2 times daily      Multiple Vitamins-Minerals (THERAPEUTIC MULTIVITAMIN-MINERALS) tablet Take 1 tablet by mouth daily      zolpidem (AMBIEN) 5 MG tablet Take 1 tablet by mouth nightly as needed for Sleep for up to 90 days. 30 tablet 2    pravastatin (PRAVACHOL) 20 MG tablet Take 1 tablet by mouth daily 90 tablet 1    Diclofenac Sodium POWD 2 g by Does not apply route 4 times daily Formula 5D Diclo 3% Prilo 2% Lido 2% (Patient taking differently: 2 g by Does not apply route 4 times daily Formula 5D Diclo 3% Prilo 2%) 240 g 11    omega-3 acid ethyl esters (LOVAZA) 1 g capsule Take 2 capsules by mouth 2 times daily 360 capsule 1    CALCIUM PO Take by mouth      omeprazole (PRILOSEC) 20 MG delayed release capsule Take 20 mg by mouth daily      docusate sodium (COLACE) 100 MG capsule Take 100 mg by mouth 2 times daily      Cholecalciferol (VITAMIN D) 2000 UNITS CAPS capsule Take by mouth      Multiple Minerals-Vitamins (CALCIUM CITRATE PLUS PO) Take by mouth      naproxen (NAPROSYN) 250 MG tablet Take 500 mg by mouth 2 times daily (with meals)       Melatonin 3 MG TABS Take 3 mg by mouth daily. [address] ([employer]) MISC by Does not apply route.  As directed           Allergies:  Bactrim [sulfamethoxazole-trimethoprim] and Doxycycline      Social History:   Social History     Tobacco Use    Smoking status: Never Smoker    Smokeless tobacco: Never Used   Substance Use Topics    Alcohol use: No     Alcohol/week: 0.0 standard drinks     Family History:   Family History   Problem Relation Age of Onset    Stroke Mother     Glaucoma Mother     Cataracts Mother     High Cholesterol Mother     High Blood Pressure Mother     Heart Disease Mother     High Blood Pressure Father  Heart Disease Paternal Uncle     Cancer Paternal Uncle     Cancer Other     Stroke Maternal Uncle     Heart Disease Maternal Uncle     Mental Illness Maternal Grandmother     No Known Problems Sister     No Known Problems Brother     No Known Problems Maternal Aunt     No Known Problems Paternal Aunt     No Known Problems Maternal Grandfather     No Known Problems Paternal Grandmother     No Known Problems Paternal Grandfather     Rheum Arthritis Neg Hx     Osteoarthritis Neg Hx     Asthma Neg Hx     Breast Cancer Neg Hx     Diabetes Neg Hx     Heart Failure Neg Hx     Hypertension Neg Hx     Migraines Neg Hx     Ovarian Cancer Neg Hx     Rashes/Skin Problems Neg Hx     Seizures Neg Hx     Thyroid Disease Neg Hx        PHYSICAL EXAM:      /70   Pulse 73   Temp 98.2 °F (36.8 °C) (Temporal)   Resp 16   Ht 5' 7\" (1.702 m)   Wt 124 lb (56.2 kg)   LMP  (LMP Unknown)   SpO2 100%   BMI 19.42 kg/m²  I        Heart:  RRR, normal s1s2    Lungs:  CTA and normal effort    Abdomen:   Soft, nt nd. ASSESSMENT AND PLAN:    1. Patient is a 60 y.o. female here for endoscopy with MAC sedation. 2.  Procedure options, risks and benefits reviewed with patient and/or guardian. They express understanding.

## 2020-07-31 NOTE — ANESTHESIA PRE PROCEDURE
Department of Anesthesiology  Preprocedure Note       Name:  Jermaine Benitez   Age:  60 y.o.  :  1955                                          MRN:  [id number]         Date:  2020      Surgeon: Dipak Iglesias):  Mellissa Olguin MD    Procedure: Procedure(s):  COLONOSCOPY DIAGNOSTIC    Medications prior to admission:   Prior to Admission medications    Medication Sig Start Date End Date Taking? Authorizing Provider   Estradiol (VAGIFEM) 10 MCG TABS vaginal tablet Place 1 tablet vaginally Twice a Week 20  Kay Burciaga MD   ondansetron [facility]) 4 MG tablet Take 1 tablet by mouth every 8 hours as needed for Nausea or Vomiting 20   Bryan Vidal MD   zolpidem (AMBIEN) 5 MG tablet Take 1 tablet by mouth nightly as needed for Sleep for up to 90 days. 20  Bryan Vidal MD   pravastatin (PRAVACHOL) 20 MG tablet Take 1 tablet by mouth daily 20   Bryan Vidal MD   Diclofenac Sodium POWD 2 g by Does not apply route 4 times daily Formula 5D Diclo 3% Prilo 2% Lido 2% 19   Bryan Vidal MD   omega-3 acid ethyl esters (LOVAZA) 1 g capsule Take 2 capsules by mouth 2 times daily 19   Bryan Vidal MD   CALCIUM PO Take by mouth    Historical Provider, MD   MINOXIDIL, TOPICAL, 5 % SOLN Apply topically    Historical Provider, MD   omeprazole (PRILOSEC) 20 MG delayed release capsule Take 20 mg by mouth daily    Historical Provider, MD   docusate sodium (COLACE) 100 MG capsule Take 100 mg by mouth 2 times daily    Historical Provider, MD   Cholecalciferol (VITAMIN D) 2000 UNITS CAPS capsule Take by mouth    Historical Provider, MD   Multiple Minerals-Vitamins (CALCIUM CITRATE PLUS PO) Take by mouth    Historical Provider, MD   naproxen (NAPROSYN) 250 MG tablet Take 500 mg by mouth 2 times daily (with meals)     Historical Provider, MD   Melatonin 3 MG TABS Take 3 mg by mouth daily.     Historical Provider, MD   [address] ([facility] [employer] [facility]) MISC by Does not apply route. As directed     Historical Provider, MD       Current medications:    No current facility-administered medications for this encounter. Allergies:     Allergies   Allergen Reactions    Bactrim [Sulfamethoxazole-Trimethoprim] Rash    Doxycycline Nausea And Vomiting       Problem List:    Patient Active Problem List   Diagnosis Code    Vitamin D deficiency E55.9    Acne rosacea L71.9    Anxiety F41.9    Menopause Z78.0    Hand arthropathy M19.049    Degenerative joint disease of knee M17.10    Insomnia G47.00    Insufficiency of tear film of both eyes H04.123    Nuclear sclerosis of both eyes H25.13    Mixed hyperlipidemia E78.2    Gastroesophageal reflux disease K21.9    Pre-diabetes R73.03    Mild episode of recurrent major depressive disorder ([address] F33.0       Past Medical History:        Diagnosis Date    Acne rosacea 2011    Anxiety     Diabetes mellitus ([address]     Diabetic eye exam ([address] 4.9.15    [facility]    Diabetic eye exam ([address] 16    [facility]     Diverticulosis 2013    Gastroesophageal reflux disease 5/15/2018    Hyperlipidemia     Insufficiency of tear film of both eyes 2016    Mild episode of recurrent major depressive disorder ([address] 2019    Nuclear sclerosis of both eyes 2016    Pneumothorax     Pregnancy     grava - 2, para - 2, uterine rupture with stillbirth    Type 2 diabetes mellitus without complication ([address] 09/3/9401    Uterus didelphus        Past Surgical History:        Procedure Laterality Date     SECTION      2    COLONOSCOPY      VAGINA SURGERY      septum removed    WISDOM TOOTH EXTRACTION      x 4       Social History:    Social History     Tobacco Use    Smoking status: Never Smoker    Smokeless tobacco: Never Used   Substance Use Topics    Alcohol use: No     Alcohol/week: 0.0 standard drinks                                Counseling given: Not Answered      Vital Signs (Current): There were no vitals filed for this visit. BP Readings from Last 3 Encounters:   12/19/19 138/76   11/08/19 126/62   08/29/19 136/78       NPO Status:                                                                                 BMI:   Wt Readings from Last 3 Encounters:   12/19/19 126 lb 3.2 oz (57.2 kg)   11/08/19 125 lb 6.4 oz (56.9 kg)   08/29/19 123 lb 3.2 oz (55.9 kg)     There is no height or weight on file to calculate BMI.    CBC:   Lab Results   Component Value Date    WBC 9.0 04/30/2018    RBC 4.55 04/30/2018    HGB 15.0 04/30/2018    HCT 43.8 04/30/2018    MCV 96.3 04/30/2018    RDW 13.2 04/30/2018     04/30/2018       CMP:   Lab Results   Component Value Date     05/06/2020    K 3.9 05/06/2020    K 3.3 04/30/2018    CL 98 05/06/2020    CO2 29 05/06/2020    BUN 18 05/06/2020    CREATININE <0.5 05/06/2020    GFRAA >60 05/06/2020    GFRAA >60 01/16/2013    AGRATIO 1.9 05/06/2020    LABGLOM >60 05/06/2020    LABGLOM 103 12/09/2011    GLUCOSE 102 05/06/2020    GLUCOSE 97 12/09/2011    PROT 6.7 05/06/2020    PROT 6.9 01/16/2013    CALCIUM 9.4 05/06/2020    BILITOT 0.4 05/06/2020    ALKPHOS 41 05/06/2020    AST 16 05/06/2020    ALT 17 05/06/2020       POC Tests: No results for input(s): POCGLU, POCNA, POCK, POCCL, POCBUN, POCHEMO, POCHCT in the last 72 hours.     Coags: No results found for: PROTIME, INR, APTT    HCG (If Applicable):   Lab Results   Component Value Date    PREGTESTUR negative 09/01/2010        ABGs: No results found for: PHART, PO2ART, TPE7AHG, ORS0LAC, BEART, S8RFMUQW     Type & Screen (If Applicable):  No results found for: LABABO, LABRH    Drug/Infectious Status (If Applicable):  No results found for: HIV, HEPCAB    COVID-19 Screening (If Applicable):   Lab Results   Component Value Date    COVID19 Not Detected 07/27/2020         Anesthesia Evaluation  Patient summary reviewed and Nursing notes reviewed no history of anesthetic complications:   Airway: Mallampati: II  TM distance: >3 FB   Neck ROM: full  Mouth opening: > = 3 FB Dental: normal exam         Pulmonary:normal exam  breath sounds clear to auscultation      (-) COPD, asthma, sleep apnea and not a current smoker                           Cardiovascular:    (+) hyperlipidemia    (-) hypertension, past MI, CAD, CABG/stent, dysrhythmias,  angina and  CHF    ECG reviewed  Rhythm: regular  Rate: normal                    Neuro/Psych:   (+) depression/anxiety    (-) seizures, TIA and CVA           GI/Hepatic/Renal:   (+) GERD: well controlled,      (-) liver disease and no renal disease       Endo/Other:    (+) : arthritis: OA., .    (-) diabetes mellitus (pre per report, a1c 5.6), hypothyroidism, hyperthyroidism               Abdominal:           Vascular:                                        Anesthesia Plan      MAC     ASA 2       Induction: intravenous. Anesthetic plan and risks discussed with patient. Plan discussed with CRNA.                   Cristhian Lala MD   7/31/2020

## 2020-08-05 RX ORDER — OMEGA-3-ACID ETHYL ESTERS 1 G/1
2 CAPSULE, LIQUID FILLED ORAL 2 TIMES DAILY
Qty: 360 CAPSULE | Refills: 1 | Status: SHIPPED | OUTPATIENT
Start: 2020-08-05 | End: 2020-11-04 | Stop reason: SDUPTHER

## 2020-08-25 ENCOUNTER — OFFICE VISIT (OUTPATIENT)
Dept: FAMILY MEDICINE CLINIC | Age: 65
End: 2020-08-25
Payer: COMMERCIAL

## 2020-08-25 VITALS
DIASTOLIC BLOOD PRESSURE: 72 MMHG | HEART RATE: 81 BPM | TEMPERATURE: 97.7 F | HEIGHT: 67 IN | SYSTOLIC BLOOD PRESSURE: 118 MMHG | OXYGEN SATURATION: 97 % | WEIGHT: 129 LBS | BODY MASS INDEX: 20.25 KG/M2

## 2020-08-25 PROCEDURE — 99213 OFFICE O/P EST LOW 20 MIN: CPT | Performed by: NURSE PRACTITIONER

## 2020-08-25 ASSESSMENT — ENCOUNTER SYMPTOMS
SORE THROAT: 0
RHINORRHEA: 0
TROUBLE SWALLOWING: 0

## 2020-08-25 NOTE — PROGRESS NOTES
Provider, MD   Biotin 2500 MCG CAPS Take by mouth daily Yes Historical Provider, MD   acetaminophen (TYLENOL) 500 MG tablet Take 500 mg by mouth every 6 hours as needed for Pain Yes Historical Provider, MD   Vitamin D, Cholecalciferol, 25 MCG (1000 UT) TABS Take by mouth 2 times daily Yes Historical Provider, MD   Multiple Vitamins-Minerals (THERAPEUTIC MULTIVITAMIN-MINERALS) tablet Take 1 tablet by mouth daily Yes Historical Provider, MD   ondansetron (ZOFRAN) 4 MG tablet Take 1 tablet by mouth every 8 hours as needed for Nausea or Vomiting Yes Candace Wang MD   pravastatin (PRAVACHOL) 20 MG tablet Take 1 tablet by mouth daily Yes Candace Wang MD   Diclofenac Sodium POWD 2 g by Does not apply route 4 times daily Formula 5D Diclo 3% Prilo 2% Lido 2%  Patient taking differently: 2 g by Does not apply route 4 times daily Formula 5D Diclo 3% Prilo 2% Yes Candace Wang MD   CALCIUM PO Take by mouth Yes Historical Provider, MD   MINOXIDIL, TOPICAL, 5 % SOLN Apply topically daily  Yes Historical Provider, MD   omeprazole (PRILOSEC) 20 MG delayed release capsule Take 20 mg by mouth daily Yes Historical Provider, MD   docusate sodium (COLACE) 100 MG capsule Take 100 mg by mouth 2 times daily Yes Historical Provider, MD   Cholecalciferol (VITAMIN D) 2000 UNITS CAPS capsule Take by mouth Yes Historical Provider, MD   Multiple Minerals-Vitamins (CALCIUM CITRATE PLUS PO) Take by mouth Yes Historical Provider, MD   naproxen (NAPROSYN) 250 MG tablet Take 500 mg by mouth 2 times daily (with meals)  Yes Historical Provider, MD   Melatonin 3 MG TABS Take 3 mg by mouth daily. Yes Historical Provider, MD   [address] [facility] ([facility] [employer] [facility]) MISC by Does not apply route.  As directed  Yes Historical Provider, MD   Estradiol (VAGIFEM) 10 MCG TABS vaginal tablet Place 1 tablet vaginally Twice a Week  Fernanda Peterson MD        Social History     Tobacco Use    Smoking status: Never Smoker    Smokeless

## 2020-11-02 ENCOUNTER — TELEPHONE (OUTPATIENT)
Dept: DERMATOLOGY | Age: 65
End: 2020-11-02

## 2020-11-02 NOTE — TELEPHONE ENCOUNTER
Patient is a patient of Dr Radha Eduardo and is requesting a return call regarding Botox; cost type etc. Patient works nights and is requesting a call prior to 1:30pm. Please advise. Thank you.

## 2020-11-03 NOTE — TELEPHONE ENCOUNTER
Pt lmvm regarding message that was left yesterday  Pt c/b [phone]  Pt was called back  Pt was informed that DEIRDRE will give her a return call tomorrow  Pt states:   - best time to call would be between 9:30 and noon since she works night  Please call to discuss thanks

## 2020-11-04 ENCOUNTER — OFFICE VISIT (OUTPATIENT)
Dept: FAMILY MEDICINE CLINIC | Age: 65
End: 2020-11-04
Payer: COMMERCIAL

## 2020-11-04 VITALS
DIASTOLIC BLOOD PRESSURE: 76 MMHG | SYSTOLIC BLOOD PRESSURE: 118 MMHG | HEART RATE: 74 BPM | OXYGEN SATURATION: 99 % | BODY MASS INDEX: 20.12 KG/M2 | TEMPERATURE: 97.6 F | WEIGHT: 128.2 LBS | HEIGHT: 67 IN

## 2020-11-04 PROCEDURE — 99214 OFFICE O/P EST MOD 30 MIN: CPT | Performed by: FAMILY MEDICINE

## 2020-11-04 RX ORDER — PRAVASTATIN SODIUM 20 MG
20 TABLET ORAL DAILY
Qty: 90 TABLET | Refills: 1 | Status: SHIPPED | OUTPATIENT
Start: 2020-11-04 | End: 2021-07-07

## 2020-11-04 RX ORDER — ZOLPIDEM TARTRATE 5 MG/1
TABLET ORAL
COMMUNITY
Start: 2020-08-31 | End: 2021-10-27

## 2020-11-04 RX ORDER — ZOLPIDEM TARTRATE 6.25 MG/1
6.25 TABLET, FILM COATED, EXTENDED RELEASE ORAL NIGHTLY PRN
Qty: 30 TABLET | Refills: 0 | Status: SHIPPED | OUTPATIENT
Start: 2020-11-04 | End: 2021-01-26

## 2020-11-04 RX ORDER — OMEGA-3-ACID ETHYL ESTERS 1 G/1
5 CAPSULE, LIQUID FILLED ORAL DAILY
Qty: 450 CAPSULE | Refills: 1 | Status: SHIPPED | OUTPATIENT
Start: 2020-11-04 | End: 2021-10-27 | Stop reason: SDUPTHER

## 2020-11-04 ASSESSMENT — ENCOUNTER SYMPTOMS: RESPIRATORY NEGATIVE: 1

## 2020-11-04 NOTE — PROGRESS NOTES
memeoSubjective:      Patient ID: Swathi Wayne is a 63 y.o. female. HPI   Pt is a of 63 y.o. female comes in today with   Chief Complaint   Patient presents with    6 Month Follow-Up     Back to dancing but still having a lot of pain from bunion and back. Following with podiatry. On 2000 vit d daily. Finds increased dose of lovaza more helpful. Due for bloodwork for hyperlipidemia    [place] helps a little but wakes up after 5 hours. Still needs 3 mg melatonin. Allergies   Allergen Reactions    Bactrim [Sulfamethoxazole-Trimethoprim] Rash    Doxycycline Nausea And Vomiting      Vitals:    11/04/20 0803   BP: 118/76   Site: Left Upper Arm   Position: Sitting   Cuff Size: Small Adult   Pulse: 74   Temp: 97.6 °F (36.4 °C)   TempSrc: Temporal   SpO2: 99%   Weight: 128 lb 3.2 oz (58.2 kg)   Height: 5' 7\" (1.702 m)        Review of Systems   Constitutional: Negative. Respiratory: Negative. Objective:   Physical Exam    Assessment:       Diagnosis Orders   1. Pre-diabetes  Hemoglobin A1C   2. Mixed hyperlipidemia  Basic Metabolic Panel    Lipid Panel    omega-3 acid ethyl esters (LOVAZA) 1 g capsule    pravastatin (PRAVACHOL) 20 MG tablet   3. Vitamin D deficiency  Vitamin D 25 Hydroxy   4. Insomnia, unspecified type  zolpidem (AMBIEN CR) 6.25 MG extended release tablet          Plan:    she finds 5 g lovaza more effective  rx increased. If denied will send 360 to mail order and 120 to [facility]. Doing well on pravastatin. blood sugar has been stable with diet and exercise. change to CR ambien for better control of insomnia    Continue vit d.     Estephanie Hampton MD

## 2020-11-04 NOTE — TELEPHONE ENCOUNTER
Patient scheduled to have botox (New Patient-Dr. Dangelo's patient) for the glabella area. Quoted [employer] $ 200-250.00.

## 2020-11-13 DIAGNOSIS — E78.2 MIXED HYPERLIPIDEMIA: ICD-10-CM

## 2020-11-13 DIAGNOSIS — R41.3 MEMORY LOSS: ICD-10-CM

## 2020-11-13 DIAGNOSIS — E55.9 VITAMIN D DEFICIENCY: ICD-10-CM

## 2020-11-13 DIAGNOSIS — R73.03 PRE-DIABETES: ICD-10-CM

## 2020-11-13 LAB
ANION GAP SERPL CALCULATED.3IONS-SCNC: 11 MMOL/L (ref 3–16)
BUN BLDV-MCNC: 19 MG/DL (ref 7–20)
CALCIUM SERPL-MCNC: 9.7 MG/DL (ref 8.3–10.6)
CHLORIDE BLD-SCNC: 98 MMOL/L (ref 99–110)
CHOLESTEROL, TOTAL: 242 MG/DL (ref 0–199)
CO2: 29 MMOL/L (ref 21–32)
CREAT SERPL-MCNC: <0.5 MG/DL (ref 0.6–1.2)
FOLATE: >20 NG/ML (ref 4.78–24.2)
GFR AFRICAN AMERICAN: >60
GFR NON-AFRICAN AMERICAN: >60
GLUCOSE BLD-MCNC: 96 MG/DL (ref 70–99)
HDLC SERPL-MCNC: 97 MG/DL (ref 40–60)
LDL CHOLESTEROL CALCULATED: 132 MG/DL
POTASSIUM SERPL-SCNC: 4.5 MMOL/L (ref 3.5–5.1)
SODIUM BLD-SCNC: 138 MMOL/L (ref 136–145)
TRIGL SERPL-MCNC: 66 MG/DL (ref 0–150)
TSH REFLEX: 1.23 UIU/ML (ref 0.27–4.2)
VITAMIN B-12: 1493 PG/ML (ref 211–911)
VITAMIN D 25-HYDROXY: 51.7 NG/ML
VLDLC SERPL CALC-MCNC: 13 MG/DL

## 2020-11-14 LAB
ESTIMATED AVERAGE GLUCOSE: 122.6 MG/DL
HBA1C MFR BLD: 5.9 %

## 2020-12-08 ENCOUNTER — PROCEDURE VISIT (OUTPATIENT)
Dept: DERMATOLOGY | Age: 65
End: 2020-12-08

## 2020-12-08 VITALS — TEMPERATURE: 97.5 F

## 2020-12-08 PROCEDURE — DM00120 PR DERM ONLY BOTOX INJ LEVEL 3: Performed by: DERMATOLOGY

## 2020-12-08 NOTE — PROGRESS NOTES
[facility]  Deliliah Runner, MD  [phone]      Nanette Orozco  1955    63 y.o. female     Date of Visit: 12/8/2020     *Dr. 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 History of Present Illness:    Here for eval and trx of glabellar wrinkles. No previous trx. No NM probs, not pregnant or BF. No bleeding probs,. Review of Systems:  Gen: Feels well, good sense of health. No F/C. Past Medical History, Family History, Surgical History, Medications and Allergies reviewed.     Outpatient Medications Marked as Taking for the 12/8/20 encounter (Procedure visit) with Sofiya Haq MD   Medication Sig Dispense Refill    zolpidem (AMBIEN) 5 MG tablet       omega-3 acid ethyl esters (LOVAZA) 1 g capsule Take 5 capsules by mouth daily 450 capsule 1    pravastatin (PRAVACHOL) 20 MG tablet Take 1 tablet by mouth daily 90 tablet 1    ARTIFICIAL TEAR OP Apply to eye as needed      cycloSPORINE (RESTASIS OP) Apply 1 drop to eye 2 times daily Each eye      Calcium-Magnesium-Vitamin D (CALCIUM 1200+D3 PO) Take by mouth 2 times daily      Calcium Carbonate Antacid (TUMS ULTRA 1000 PO) Take by mouth as needed      LACTOBACILLUS PROBIOTIC PO Take by mouth every evening      NAPROXEN PO Take 220 mg by mouth 2 times daily      Biotin 2500 MCG CAPS Take by mouth daily      acetaminophen (TYLENOL) 500 MG tablet Take 500 mg by mouth every 6 hours as needed for Pain      Multiple Vitamins-Minerals (THERAPEUTIC MULTIVITAMIN-MINERALS) tablet Take 1 tablet by mouth daily      ondansetron (ZOFRAN) 4 MG tablet Take 1 tablet by mouth every 8 hours as needed for Nausea or Vomiting 15 tablet 1    Diclofenac Sodium POWD 2 g by Does not apply route 4 times daily Formula 5D Diclo 3% Prilo 2% Lido 2% (Patient taking differently: 2 g by Does not apply route 4 times daily Formula 5D Diclo 3% Prilo 2%) 240 g 11    MINOXIDIL, TOPICAL, 5 % SOLN Apply topically daily       omeprazole (PRILOSEC) 20 MG delayed release capsule Take 20 mg by mouth daily      docusate sodium (COLACE) 100 MG capsule Take 100 mg by mouth 2 times daily      Cholecalciferol (VITAMIN D) 2000 UNITS CAPS capsule Take by mouth      naproxen (NAPROSYN) 250 MG tablet Take 500 mg by mouth 2 times daily (with meals)       Melatonin 3 MG TABS Take 3 mg by mouth daily. [address] [facility] [address] ([employer] [facility]) MISC by Does not apply route. As directed        Allergies   Allergen Reactions    Bactrim [Sulfamethoxazole-Trimethoprim] Rash    Doxycycline Nausea And Vomiting       Past Medical History:   Diagnosis Date    Acne rosacea 2011    Anxiety     Colon polyps     Diabetes mellitus ([address]     pre-diabetic (diet controlled)    Diabetic eye exam ([address] 4..15    [facility]    Diabetic eye exam ([facility] 16    [facility]     Diverticulosis 2013    Dry eyes     Gastroesophageal reflux disease 5/15/2018    Hyperlipidemia     Insufficiency of tear film of both eyes 2016    Mild episode of recurrent major depressive disorder ([address] 2019    Nuclear sclerosis of both eyes 2016    Pneumothorax     Pregnancy     grava - 2, para - 2, uterine rupture with stillbirth    Uterus didelphus      Past Surgical History:   Procedure Laterality Date     SECTION      2    COLONOSCOPY      COLONOSCOPY N/A 2020    COLONOSCOPY POLYPECTOMY SNARE/COLD BIOPSY performed by Sapna Mclain MD at [facility] 149      septum removed    WISDOM TOOTH EXTRACTION      x 4       Physical Examination     Gen, well-appearing    At rest and with furrowing                Assessment and Plan     1.  Wrinkles  Discussed expectations - will likely have softening of wrinkles but not complete resolution - she acknowledges this and would like to proceed  Ed risks/SE including bruising, incomplete correction and asymmetry. Pt consented to trx.   Botox (dil 3 ml in 100 units)   Total 0.6 ml to glabella (0.075  0.15  0.15  0.15  0.075)  ed no lying flat x 4 hrs   ed no massage of treated areas   ed onset   ed full effects can take up to 10-14 days   ed duration     botox - 20 u - 200 ([facility])

## 2020-12-14 NOTE — TELEPHONE ENCOUNTER
Pt states biomed was supposed to have requested medication last week and that she is out of medication    Diclofenac/gabapentin/lidocaine cream

## 2020-12-14 NOTE — TELEPHONE ENCOUNTER
Last Fill 12/6/19  Last Office Visit 11/4/20   Return in about 6 months (around 5/4/2021).   No Pending Appointments

## 2020-12-16 ENCOUNTER — TELEPHONE (OUTPATIENT)
Dept: DERMATOLOGY | Age: 65
End: 2020-12-16

## 2020-12-16 NOTE — TELEPHONE ENCOUNTER
Patient is calling to schedule a touch-up for her botox. Please call sometime before 1, she works the night shift.      [phone]

## 2020-12-21 ENCOUNTER — OFFICE VISIT (OUTPATIENT)
Dept: DERMATOLOGY | Age: 65
End: 2020-12-21

## 2020-12-21 VITALS — TEMPERATURE: 95.4 F

## 2020-12-21 PROCEDURE — 99999 PR OFFICE/OUTPT VISIT,PROCEDURE ONLY: CPT | Performed by: DERMATOLOGY

## 2020-12-21 NOTE — PROGRESS NOTES
[facility]  Alfa Cabezas MD  [phone]      Meera Orozco  1955    63 y.o. female     Date of Visit: 12/21/2020     *Dr. 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 First trx. No complications with trx. Val Staples is immobile but still has fixed furrow and wonders if any additional improvement can be achieved with more trx. No NM probs, not pregnant or BF. No bleeding probs,. Review of Systems:  Gen: Feels well, good sense of health. No F/C. Past Medical History, Family History, Surgical History, Medications and Allergies reviewed.     Outpatient Medications Marked as Taking for the 12/21/20 encounter (Office Visit) with Sue Lombardo MD   Medication Sig Dispense Refill    Diclofenac Sodium POWD 2 g by Does not apply route 4 times daily Formula 5D Diclo 3% Prilo 2% Lido 2% 240 g 11    zolpidem (AMBIEN) 5 MG tablet       omega-3 acid ethyl esters (LOVAZA) 1 g capsule Take 5 capsules by mouth daily 450 capsule 1    pravastatin (PRAVACHOL) 20 MG tablet Take 1 tablet by mouth daily 90 tablet 1    ARTIFICIAL TEAR OP Apply to eye as needed      cycloSPORINE (RESTASIS OP) Apply 1 drop to eye 2 times daily Each eye      Calcium Carbonate Antacid (TUMS ULTRA 1000 PO) Take by mouth as needed      NAPROXEN PO Take 220 mg by mouth 2 times daily      Multiple Vitamins-Minerals (THERAPEUTIC MULTIVITAMIN-MINERALS) tablet Take 1 tablet by mouth daily      ondansetron (ZOFRAN) 4 MG tablet Take 1 tablet by mouth every 8 hours as needed for Nausea or Vomiting 15 tablet 1    MINOXIDIL, TOPICAL, 5 % SOLN Apply topically daily       omeprazole (PRILOSEC) 20 MG delayed release capsule Take 20 mg by mouth daily  docusate sodium (COLACE) 100 MG capsule Take 100 mg by mouth 2 times daily      Cholecalciferol (VITAMIN D) 2000 UNITS CAPS capsule Take by mouth      naproxen (NAPROSYN) 250 MG tablet Take 500 mg by mouth 2 times daily (with meals)       Melatonin 3 MG TABS Take 3 mg by mouth daily. [address] ([employer] [facility]) MISC by Does not apply route. As directed        Allergies   Allergen Reactions    Bactrim [Sulfamethoxazole-Trimethoprim] Rash    Doxycycline Nausea And Vomiting       Past Medical History:   Diagnosis Date    Acne rosacea 2011    Anxiety     Colon polyps     Diabetes mellitus ([address]     pre-diabetic (diet controlled)    Diabetic eye exam ([facility] [address] [address] .9.15    [facility]    Diabetic eye exam ([address] [facility] [address] 16    [facility]     Diverticulosis 2013    Dry eyes     Gastroesophageal reflux disease 5/15/2018    Hyperlipidemia     Insufficiency of tear film of both eyes 2016    Mild episode of recurrent major depressive disorder ([address] 2019    Nuclear sclerosis of both eyes 2016    Pneumothorax     Pregnancy     grava - 2, para - 2, uterine rupture with stillbirth    Uterus didelphus      Past Surgical History:   Procedure Laterality Date     SECTION      2    COLONOSCOPY      COLONOSCOPY N/A 2020    COLONOSCOPY POLYPECTOMY SNARE/COLD BIOPSY performed by Radha Stone MD at [facility] 149      septum removed    WISDOM TOOTH EXTRACTION      x 4       Physical Examination     Gen, well-appearing    At rest and with furrowing - pre treatment () and 2 weeks after trx ()              Assessment and Plan     1.  Wrinkles  - Discussed expectations - had previously discussed will have softening of wrinkles but not complete resolution and discussed that I do not think additional treatment at this point would result in any further improvement since glabellar muscles are immobile - discussed that some practitioners will inject filler into the fixed remaining wrinkles but ed risk of rare but serious complications from this and I do not do this  - discussed that she may continue to have more improvement with repeated treatments but fixed furrow is almost like \"scar\"  - can consider microneedling or CO2 laser for this area if desired in the future if still fairly deep with repeated botox trx - 50 for needle or test     No charge - f/u.

## 2021-01-26 DIAGNOSIS — G47.00 INSOMNIA, UNSPECIFIED TYPE: ICD-10-CM

## 2021-01-26 RX ORDER — ZOLPIDEM TARTRATE 6.25 MG/1
TABLET, FILM COATED, EXTENDED RELEASE ORAL
Qty: 30 TABLET | Refills: 2 | Status: SHIPPED | OUTPATIENT
Start: 2021-01-26 | End: 2021-07-16

## 2021-01-26 NOTE — TELEPHONE ENCOUNTER
Last OV 11/4/2020   Next OV Visit date not found  Last Fill 11/4/2020    Last OARRS No flowsheet data found.

## 2021-01-29 ENCOUNTER — HOSPITAL ENCOUNTER (OUTPATIENT)
Dept: MAMMOGRAPHY | Age: 66
Discharge: HOME | End: 2021-01-29
Payer: COMMERCIAL

## 2021-01-29 DIAGNOSIS — Z12.31 VISIT FOR SCREENING MAMMOGRAM: ICD-10-CM

## 2021-01-29 PROCEDURE — 77063 BREAST TOMOSYNTHESIS BI: CPT

## 2021-02-16 ENCOUNTER — TELEPHONE (OUTPATIENT)
Dept: GYNECOLOGY | Age: 66
End: 2021-02-16

## 2021-02-16 NOTE — TELEPHONE ENCOUNTER
Called and spoke to patient, patient said that she will call our office to reschedule her appointment she got message her appointment was cancelled for 2/17/2021

## 2021-03-16 ENCOUNTER — TELEPHONE (OUTPATIENT)
Dept: FAMILY MEDICINE CLINIC | Age: 66
End: 2021-03-16

## 2021-03-16 DIAGNOSIS — R73.03 PRE-DIABETES: Primary | ICD-10-CM

## 2021-03-16 DIAGNOSIS — E78.2 MIXED HYPERLIPIDEMIA: ICD-10-CM

## 2021-03-16 NOTE — TELEPHONE ENCOUNTER
Patient is requesting be well lab orders so they can be completed prior to appointment in May.    ----- Message from Arvind Antonio sent at 3/16/2021  3:17 PM EDT -----  Subject: Message to Provider    QUESTIONS  Information for Provider? Pt wants to know if she needs some blood work done for her upcoming be well visit on 5/5/21  and if so  can PCP put in orders for them.  ---------------------------------------------------------------------------  --------------  [address]  What is the best way for the office to contact you? OK to leave message on   voicemail  Preferred Call Back Phone Number? [phone]  ---------------------------------------------------------------------------  --------------  SCRIPT ANSWERS  Relationship to Patient?  Self

## 2021-05-04 DIAGNOSIS — R73.03 PRE-DIABETES: ICD-10-CM

## 2021-05-04 DIAGNOSIS — E78.2 MIXED HYPERLIPIDEMIA: ICD-10-CM

## 2021-05-04 LAB
ANION GAP SERPL CALCULATED.3IONS-SCNC: 13 MMOL/L (ref 3–16)
BUN BLDV-MCNC: 19 MG/DL (ref 7–20)
CALCIUM SERPL-MCNC: 9.2 MG/DL (ref 8.3–10.6)
CHLORIDE BLD-SCNC: 98 MMOL/L (ref 99–110)
CHOLESTEROL, TOTAL: 222 MG/DL (ref 0–199)
CO2: 28 MMOL/L (ref 21–32)
CREAT SERPL-MCNC: 0.6 MG/DL (ref 0.6–1.2)
GFR AFRICAN AMERICAN: >60
GFR NON-AFRICAN AMERICAN: >60
GLUCOSE BLD-MCNC: 84 MG/DL (ref 70–99)
HDLC SERPL-MCNC: 100 MG/DL (ref 40–60)
LDL CHOLESTEROL CALCULATED: 113 MG/DL
POTASSIUM SERPL-SCNC: 3.9 MMOL/L (ref 3.5–5.1)
SODIUM BLD-SCNC: 139 MMOL/L (ref 136–145)
TRIGL SERPL-MCNC: 47 MG/DL (ref 0–150)
VLDLC SERPL CALC-MCNC: 9 MG/DL

## 2021-05-05 ENCOUNTER — OFFICE VISIT (OUTPATIENT)
Dept: FAMILY MEDICINE CLINIC | Age: 66
End: 2021-05-05
Payer: COMMERCIAL

## 2021-05-05 VITALS
WEIGHT: 125 LBS | OXYGEN SATURATION: 98 % | SYSTOLIC BLOOD PRESSURE: 110 MMHG | DIASTOLIC BLOOD PRESSURE: 62 MMHG | BODY MASS INDEX: 20.09 KG/M2 | HEART RATE: 74 BPM | HEIGHT: 66 IN

## 2021-05-05 DIAGNOSIS — E78.2 MIXED HYPERLIPIDEMIA: ICD-10-CM

## 2021-05-05 DIAGNOSIS — E55.9 VITAMIN D DEFICIENCY: ICD-10-CM

## 2021-05-05 DIAGNOSIS — R73.03 PRE-DIABETES: ICD-10-CM

## 2021-05-05 DIAGNOSIS — G47.00 INSOMNIA, UNSPECIFIED TYPE: ICD-10-CM

## 2021-05-05 DIAGNOSIS — Z00.00 WELL ADULT EXAM: Primary | ICD-10-CM

## 2021-05-05 LAB
ESTIMATED AVERAGE GLUCOSE: 116.9 MG/DL
HBA1C MFR BLD: 5.7 %

## 2021-05-05 PROCEDURE — 99396 PREV VISIT EST AGE 40-64: CPT | Performed by: FAMILY MEDICINE

## 2021-05-05 ASSESSMENT — ENCOUNTER SYMPTOMS: RESPIRATORY NEGATIVE: 1

## 2021-05-05 NOTE — PROGRESS NOTES
2021    Alfa Jimenez[address] (:  1955) is a 63 y.o. female, here for a preventive medicine evaluation. Patient Active Problem List   Diagnosis    Vitamin D deficiency    Acne rosacea    Anxiety    Menopause    Hand arthropathy    Degenerative joint disease of knee    Insomnia    Insufficiency of tear film of both eyes    Nuclear sclerosis of both eyes    Mixed hyperlipidemia    Gastroesophageal reflux disease    Pre-diabetes    Mild episode of recurrent major depressive disorder ([address]     Pt is a of 63 y.o. female comes in today with   Chief Complaint   Patient presents with    Annual Exam     be well within          Review of Systems   Constitutional: Negative. Respiratory: Negative. Psychiatric/Behavioral: Negative. Prior to Visit Medications    Medication Sig Taking?  Authorizing Provider   Diclofenac Sodium POWD 2 g by Does not apply route 4 times daily Formula 5D Diclo 3% Prilo 2% Lido 2% Yes Shonda Arriaga MD   zolpidem (AMBIEN) 5 MG tablet  Yes Historical Provider, MD   omega-3 acid ethyl esters (LOVAZA) 1 g capsule Take 5 capsules by mouth daily Yes Shonda Arriaga MD   pravastatin (PRAVACHOL) 20 MG tablet Take 1 tablet by mouth daily Yes Shonda Arriaga MD   ARTIFICIAL TEAR OP Apply to eye as needed Yes Historical Provider, MD   cycloSPORINE (RESTASIS OP) Apply 1 drop to eye 2 times daily Each eye Yes Historical Provider, MD   Calcium-Magnesium-Vitamin D (CALCIUM 1200+D3 PO) Take by mouth 2 times daily Yes Historical Provider, MD   Calcium Carbonate Antacid (TUMS ULTRA 1000 PO) Take by mouth as needed Yes Historical Provider, MD   LACTOBACILLUS PROBIOTIC PO Take by mouth every evening Yes Historical Provider, MD   NAPROXEN PO Take 220 mg by mouth 2 times daily Yes Historical Provider, MD   Biotin 2500 MCG CAPS Take by mouth daily Yes Historical Provider, MD   acetaminophen (TYLENOL) 500 MG tablet Take 500 mg by mouth every 6 hours as needed for Pain Yes Historical Provider, MD   Multiple Vitamins-Minerals (THERAPEUTIC MULTIVITAMIN-MINERALS) tablet Take 1 tablet by mouth daily Yes Historical Provider, MD   ondansetron (ZOFRAN) 4 MG tablet Take 1 tablet by mouth every 8 hours as needed for Nausea or Vomiting Yes Wilmar Knighter, MD   MINOXIDIL, TOPICAL, 5 % SOLN Apply topically daily  Yes Historical Provider, MD   omeprazole (PRILOSEC) 20 MG delayed release capsule Take 20 mg by mouth daily Yes Historical Provider, MD   docusate sodium (COLACE) 100 MG capsule Take 100 mg by mouth 2 times daily Yes Historical Provider, MD   Cholecalciferol (VITAMIN D) 2000 UNITS CAPS capsule Take by mouth Yes Historical Provider, MD   naproxen (NAPROSYN) 250 MG tablet Take 500 mg by mouth 2 times daily (with meals)  Yes Historical Provider, MD   Melatonin 3 MG TABS Take 3 mg by mouth daily. Yes Historical Provider, MD   [facility] ([employer]) MISC by Does not apply route.  As directed  Yes Historical Provider, MD   Estradiol (VAGIFEM) 10 MCG TABS vaginal tablet Place 1 tablet vaginally Twice a Week  Kedar Cortés MD        Allergies   Allergen Reactions    Bactrim [Sulfamethoxazole-Trimethoprim] Rash    Doxycycline Nausea And Vomiting       Past Medical History:   Diagnosis Date    Acne rosacea 7/5/2011    Anxiety     Colon polyps     Diabetes mellitus ([address]     pre-diabetic (diet controlled)    Diabetic eye exam ([address] 4.9.15    [facility]    Diabetic eye exam ([facility] [facility] 4/12/16    [facility]     Diverticulosis 9/27/2013    Dry eyes     Gastroesophageal reflux disease 5/15/2018    Hyperlipidemia     Insufficiency of tear film of both eyes 4/13/2016    Mild episode of recurrent major depressive disorder ([address] 2/6/2019    Nuclear sclerosis of both eyes 4/13/2016    Pneumothorax     Pregnancy     grava - 2, para - 2, uterine rupture with stillbirth    Uterus didelphus        Past Surgical History:   Procedure Laterality Date     SECTION      2    COLONOSCOPY      COLONOSCOPY N/A 2020    COLONOSCOPY POLYPECTOMY SNARE/COLD BIOPSY performed by Nessa Grissom MD at [facility]      septum removed    WISDOM TOOTH EXTRACTION      x 4       Social History     Socioeconomic History    Marital status:      Spouse name: Not on file    Number of children: Not on file    Years of education: Not on file    Highest education level: Not on file   Occupational History    Not on file   Social Needs    Financial resource strain: Not on file    Food insecurity     Worry: Not on file     Inability: Not on file    Transportation needs     Medical: Not on file     Non-medical: Not on file   Tobacco Use    Smoking status: Never Smoker    Smokeless tobacco: Never Used   Substance and Sexual Activity    Alcohol use: No     Alcohol/week: 0.0 standard drinks    Drug use: No    Sexual activity: Yes     Partners: Male   Lifestyle    Physical activity     Days per week: Not on file     Minutes per session: Not on file    Stress: Not on file   Relationships    Social connections     Talks on phone: Not on file     Gets together: Not on file     Attends Nondenominational service: Not on file     Active member of club or organization: Not on file     Attends meetings of clubs or organizations: Not on file     Relationship status: Not on file    Intimate partner violence     Fear of current or ex partner: Not on file     Emotionally abused: Not on file     Physically abused: Not on file     Forced sexual activity: Not on file   Other Topics Concern    Not on file   Social History Narrative    Not on file        Family History   Problem Relation Age of Onset    Stroke Mother     Glaucoma Mother     Cataracts Mother     High Cholesterol Mother     High Blood Pressure Mother     Heart Disease Mother     High Blood Pressure Father     Heart Disease Paternal Uncle     Cancer Paternal Uncle     Cancer Other  Stroke Maternal Uncle     Heart Disease Maternal Uncle     Mental Illness Maternal Grandmother     No Known Problems Sister     No Known Problems Brother     No Known Problems Maternal Aunt     No Known Problems Paternal Aunt     No Known Problems Maternal Grandfather     No Known Problems Paternal Grandmother     No Known Problems Paternal Grandfather     Rheum Arthritis Neg Hx     Osteoarthritis Neg Hx     Asthma Neg Hx     Breast Cancer Neg Hx     Diabetes Neg Hx     Heart Failure Neg Hx     Hypertension Neg Hx     Migraines Neg Hx     Ovarian Cancer Neg Hx     Rashes/Skin Problems Neg Hx     Seizures Neg Hx     Thyroid Disease Neg Hx        ADVANCE DIRECTIVE: N, <no information>    Vitals:    05/05/21 0800   BP: 110/62   Site: Left Upper Arm   Position: Sitting   Cuff Size: Medium Adult   Pulse: 74   SpO2: 98%   Weight: 125 lb (56.7 kg)   Height: 5' 6\" (1.676 m)     Estimated body mass index is 20.18 kg/m² as calculated from the following:    Height as of this encounter: 5' 6\" (1.676 m). Weight as of this encounter: 125 lb (56.7 kg). Physical Exam  Constitutional:       Appearance: Normal appearance. She is well-developed. HENT:      Head: Normocephalic. Eyes:      General: No scleral icterus. Conjunctiva/sclera: Conjunctivae normal.   Neck:      Musculoskeletal: Normal range of motion and neck supple. Thyroid: No thyromegaly. Cardiovascular:      Rate and Rhythm: Normal rate. Heart sounds: Normal heart sounds. No murmur. No gallop. Pulmonary:      Effort: Pulmonary effort is normal.      Breath sounds: Normal breath sounds. No wheezing. Abdominal:      General: There is no distension. Palpations: Abdomen is soft. There is no hepatomegaly or splenomegaly. Tenderness: There is no abdominal tenderness. Lymphadenopathy:      Head:      Right side of head: No submandibular adenopathy. Left side of head: No submandibular adenopathy. Cervical: No cervical adenopathy. Upper Body:      Right upper body: No supraclavicular adenopathy. Left upper body: No supraclavicular adenopathy. Skin:     General: Skin is warm and dry. Nails: There is no clubbing. Neurological:      Mental Status: She is alert and oriented to person, place, and time. Cranial Nerves: No cranial nerve deficit. Deep Tendon Reflexes:      Reflex Scores:       Bicep reflexes are 2+ on the right side and 2+ on the left side. Patellar reflexes are 2+ on the right side and 2+ on the left side. Psychiatric:         Behavior: Behavior normal.         No flowsheet data found.     Lab Results   Component Value Date    CHOL 222 05/04/2021    CHOL 242 11/13/2020    CHOL 214 05/06/2020    TRIG 47 05/04/2021    TRIG 66 11/13/2020    TRIG 51 05/06/2020     05/04/2021    HDL 97 11/13/2020    HDL 99 05/06/2020    HDL 92 03/21/2012    HDL 81 08/17/2010    LDLCALC 113 05/04/2021    LDLCALC 132 11/13/2020    LDLCALC 105 05/06/2020    GLUCOSE 84 05/04/2021    GLUCOSE 97 12/09/2011    LABA1C 5.9 11/13/2020    LABA1C 5.6 05/06/2020    LABA1C 5.9 11/08/2019       The 10-year ASCVD risk score (Manuelito Mcintyre, et al., 2013) is: 2.6%    Values used to calculate the score:      Age: 63 years      Sex: Female      Is Non- : No      Diabetic: No      Tobacco smoker: No      Systolic Blood Pressure: 683 mmHg      Is BP treated: No      HDL Cholesterol: 100 mg/dL      Total Cholesterol: 222 mg/dL    Immunization History   Administered Date(s) Administered    Hepatitis B 10/18/2011    Influenza Vaccine, unspecified formulation 10/19/2016    Influenza Virus Vaccine 09/28/2017, 09/28/2018, 10/16/2019, 10/17/2019    InfluenzaCa, Recombinant, IM PF (Flublok 18 yrs and older) 10/07/2020    PPD Test 10/18/2011    Pneumococcal Polysaccharide ([id number]) 01/16/2018    Tdap (Boostrix, Adacel) 05/19/2009, 08/29/2019    Zoster Live (Zostavax) 01/19/2018    Zoster Recombinant (Shingrix) 01/19/2018, 08/23/2018       Health Maintenance   Topic Date Due    Shingles Vaccine (3 of 3) 10/18/2018    A1C test (Diabetic or Prediabetic)  11/13/2021    Lipid screen  05/04/2022    Breast cancer screen  01/29/2023    Cervical cancer screen  12/19/2024    Colon cancer screen colonoscopy  07/31/2025    DTaP/Tdap/Td vaccine (3 - Td) 08/29/2029    Flu vaccine  Completed    COVID-19 Vaccine  Completed    Hepatitis C screen  Completed    HIV screen  Completed    Hepatitis A vaccine  Aged Out    Hepatitis B vaccine  Aged Out    Hib vaccine  Aged Out    Meningococcal (ACWY) vaccine  Aged Out    Pneumococcal 0-64 years Vaccine  Aged Out       ASSESSMENT/PLAN:  1. Well adult exam  Good diet and exercise  2. Vitamin D deficiency  Still on 2000 units and calcium bid. Due for dexa but she wants to hold off for a little bit  3. Mixed hyperlipidemia  Stable on statin  4. Insomnia, unspecified type  ambien cr a little better. Will rf when needed    No follow-ups on file. An electronic signature was used to authenticate this note.     --Shazia Johnson MD on 5/5/2021 at 8:13 AM

## 2021-05-06 ENCOUNTER — OFFICE VISIT (OUTPATIENT)
Dept: GYNECOLOGY | Age: 66
End: 2021-05-06
Payer: COMMERCIAL

## 2021-05-06 VITALS
BODY MASS INDEX: 19.74 KG/M2 | WEIGHT: 125.8 LBS | HEART RATE: 79 BPM | SYSTOLIC BLOOD PRESSURE: 112 MMHG | HEIGHT: 67 IN | RESPIRATION RATE: 17 BRPM | DIASTOLIC BLOOD PRESSURE: 77 MMHG

## 2021-05-06 DIAGNOSIS — N94.9 ADNEXAL FULLNESS: ICD-10-CM

## 2021-05-06 DIAGNOSIS — Z01.419 WELL WOMAN EXAM WITH ROUTINE GYNECOLOGICAL EXAM: Primary | ICD-10-CM

## 2021-05-06 PROCEDURE — 99396 PREV VISIT EST AGE 40-64: CPT | Performed by: OBSTETRICS & GYNECOLOGY

## 2021-05-07 ASSESSMENT — ENCOUNTER SYMPTOMS
GASTROINTESTINAL NEGATIVE: 1
EYES NEGATIVE: 1
RESPIRATORY NEGATIVE: 1

## 2021-05-08 NOTE — PROGRESS NOTES
Subjective:      Patient ID: Lynn Carrillo is a 63 y.o. female. Patient is here for annual. Patient in menopause. Gynecologic Exam        Review of Systems   Constitutional: Negative. HENT: Negative. Eyes: Negative. Respiratory: Negative. Cardiovascular: Negative. Gastrointestinal: Negative. Genitourinary: Negative. Musculoskeletal: Negative. Skin: Negative. Neurological: Negative. Psychiatric/Behavioral: Negative.       Date of Birth 1955  Past Medical History:   Diagnosis Date    Acne rosacea 2011    Anxiety     Colon polyps     Diabetes mellitus ([address]     pre-diabetic (diet controlled)    Diabetic eye exam ([address] 4.9.15    [facility]    Diabetic eye exam ([address] 16    [facility]     Diverticulosis 2013    Dry eyes     Gastroesophageal reflux disease 5/15/2018    Hyperlipidemia     Insufficiency of tear film of both eyes 2016    Mild episode of recurrent major depressive disorder ([address] 2019    Nuclear sclerosis of both eyes 2016    Pneumothorax     Pregnancy     grava - 2, para - 2, uterine rupture with stillbirth    Uterus didelphus      Past Surgical History:   Procedure Laterality Date     SECTION      2    COLONOSCOPY      COLONOSCOPY N/A 2020    COLONOSCOPY POLYPECTOMY SNARE/COLD BIOPSY performed by Ling Salmeron MD at [facility]      septum removed    WISDOM TOOTH EXTRACTION      x 4     OB History    Para Term  AB Living   2 2       1   SAB TAB Ectopic Molar Multiple Live Births             1      # Outcome Date GA Lbr Guillermo/2nd Weight Sex Delivery Anes PTL Lv   2 Para 1996        FD   1 Para 94   5 lb (2.268 kg) F CS-Classical EPI N ANDI     Social History     Socioeconomic History    Marital status:      Spouse name: Not on file    Number of children: Not on file    Years of education: Not on file    Highest education level: PO) Take by mouth 2 times daily      Calcium Carbonate Antacid (TUMS ULTRA 1000 PO) Take by mouth as needed      LACTOBACILLUS PROBIOTIC PO Take by mouth every evening      NAPROXEN PO Take 220 mg by mouth 2 times daily      Biotin 2500 MCG CAPS Take by mouth daily      acetaminophen (TYLENOL) 500 MG tablet Take 500 mg by mouth every 6 hours as needed for Pain      Multiple Vitamins-Minerals (THERAPEUTIC MULTIVITAMIN-MINERALS) tablet Take 1 tablet by mouth daily      ondansetron (ZOFRAN) 4 MG tablet Take 1 tablet by mouth every 8 hours as needed for Nausea or Vomiting 15 tablet 1    MINOXIDIL, TOPICAL, 5 % SOLN Apply topically daily       omeprazole (PRILOSEC) 20 MG delayed release capsule Take 20 mg by mouth daily      docusate sodium (COLACE) 100 MG capsule Take 100 mg by mouth 2 times daily      Cholecalciferol (VITAMIN D) 2000 UNITS CAPS capsule Take by mouth      naproxen (NAPROSYN) 250 MG tablet Take 500 mg by mouth 2 times daily (with meals)       Melatonin 3 MG TABS Take 3 mg by mouth daily. 100 [employer] ([employer]) MISC by Does not apply route.  As directed        Family History   Problem Relation Age of Onset    Stroke Mother     Glaucoma Mother     Cataracts Mother     High Cholesterol Mother     High Blood Pressure Mother     Heart Disease Mother     High Blood Pressure Father     Heart Disease Paternal Uncle     Cancer Paternal Uncle     Cancer Other     Stroke Maternal Uncle     Heart Disease Maternal Uncle     Mental Illness Maternal Grandmother     No Known Problems Sister     No Known Problems Brother     No Known Problems Maternal Aunt     No Known Problems Paternal Aunt     No Known Problems Maternal Grandfather     No Known Problems Paternal Grandmother     No Known Problems Paternal Grandfather     Rheum Arthritis Neg Hx     Osteoarthritis Neg Hx     Asthma Neg Hx     Breast Cancer Neg Hx     Diabetes Neg Hx     Heart Failure or injury. Left: No rash, tenderness, lesion or injury. Urethra: No prolapse, urethral pain, urethral swelling or urethral lesion. Vagina: Normal. No signs of injury and foreign body. No vaginal discharge, erythema, tenderness or bleeding. Cervix: No cervical motion tenderness, discharge, friability, lesion, erythema, cervical bleeding or eversion. Uterus: Not deviated, not enlarged, not fixed, not tender and no uterine prolapse. Adnexa:         Right: No mass, tenderness or fullness. Left: No mass, tenderness or fullness. Rectum: Normal. Guaiac result negative. No mass, tenderness, anal fissure, external hemorrhoid or internal hemorrhoid. Normal anal tone. Comments: Normal urethral meatus, nl urethra, nl bladder. Fullness right adnexa    Musculoskeletal: Normal range of motion. General: No tenderness. Lymphadenopathy:      Cervical: No cervical adenopathy. Lower Body: No right inguinal adenopathy. No left inguinal adenopathy. Skin:     General: Skin is warm and dry. Findings: No erythema or rash. Neurological:      General: No focal deficit present. Mental Status: She is alert and oriented to person, place, and time. Deep Tendon Reflexes: Reflexes are normal and symmetric. Psychiatric:         Mood and Affect: Mood normal.         Behavior: Behavior normal.         Thought Content: Thought content normal.         Judgment: Judgment normal.         Assessment:      1. Annual  2. Menopause  3. Right adnexal fullness      Plan:      1. Pap, calcium, exercise, mammogram, hemocult negative  2. Stable  3.  Pelvic US-no nodularity-non OB transvaginal        Alysha Lopez MD

## 2021-05-11 ENCOUNTER — TELEPHONE (OUTPATIENT)
Dept: GYNECOLOGY | Age: 66
End: 2021-05-11

## 2021-05-11 NOTE — TELEPHONE ENCOUNTER
Pro scan [facility] called requesting order trans vaginal us.        If there are any questions call [phone]    Please fax order to [phone]

## 2021-05-26 DIAGNOSIS — Z09 FOLLOW-UP EXAM, LESS THAN 3 MONTHS SINCE PREVIOUS EXAM: Primary | ICD-10-CM

## 2021-06-02 ENCOUNTER — OFFICE VISIT (OUTPATIENT)
Dept: DERMATOLOGY | Age: 66
End: 2021-06-02
Payer: COMMERCIAL

## 2021-06-02 VITALS — TEMPERATURE: 97.7 F

## 2021-06-02 DIAGNOSIS — L82.0 INFLAMED SEBORRHEIC KERATOSIS: ICD-10-CM

## 2021-06-02 DIAGNOSIS — L82.1 SK (SEBORRHEIC KERATOSIS): ICD-10-CM

## 2021-06-02 DIAGNOSIS — D22.9 MULTIPLE NEVI: Primary | ICD-10-CM

## 2021-06-02 DIAGNOSIS — L70.9 ADULT ACNE: ICD-10-CM

## 2021-06-02 PROCEDURE — 99213 OFFICE O/P EST LOW 20 MIN: CPT | Performed by: DERMATOLOGY

## 2021-06-02 PROCEDURE — 17110 DESTRUCTION B9 LES UP TO 14: CPT | Performed by: DERMATOLOGY

## 2021-06-02 NOTE — PROGRESS NOTES
[facility]  Tone Argueta MD  [phone]      Elizabeth Orozco  1955    63 y.o. female     Date of Visit: 2021    Chief Complaint: skin lesions    History of Present Illness:    1. She has multiple moles on the extremitiesnot aware of any changes in size, color, or shape. 2.  She also has multiple asymptomatic growths on the lower neck and trunk. 3.  She complains of a recent onset acneiform lesion on the right cheek. It is typically not an issue. 4.  She also complains of an irritated lesion on the left anterior lower neck. Review of Systems:  Gen: Feels well, good sense of health. Past Medical History, Family History, Surgical History, Medications and Allergies reviewed.     Past Medical History:   Diagnosis Date    Acne rosacea 2011    Anxiety     Colon polyps     Diabetes mellitus ([address]     pre-diabetic (diet controlled)    Diabetic eye exam ([address] 4..15    [facility]    Diabetic eye exam ([address] 16    [facility]     Diverticulosis 2013    Dry eyes     Gastroesophageal reflux disease 5/15/2018    Hyperlipidemia     Insufficiency of tear film of both eyes 2016    Mild episode of recurrent major depressive disorder ([address] 2019    Nuclear sclerosis of both eyes 2016    Pneumothorax     Pregnancy     grava - 2, para - 2, uterine rupture with stillbirth    Uterus didelphus      Past Surgical History:   Procedure Laterality Date     SECTION      2    COLONOSCOPY      COLONOSCOPY N/A 2020    COLONOSCOPY POLYPECTOMY SNARE/COLD BIOPSY performed by Urmila Rebolledo MD at [facility] 149      septum removed    WISDOM TOOTH EXTRACTION      x 4       Allergies   Allergen Reactions    Bactrim [Sulfamethoxazole-Trimethoprim] Rash    Doxycycline Nausea And Vomiting     Outpatient Medications Marked as Taking for the 21 encounter (Office Visit) with Yancy Kohli MD

## 2021-07-07 DIAGNOSIS — E78.2 MIXED HYPERLIPIDEMIA: ICD-10-CM

## 2021-07-07 RX ORDER — PRAVASTATIN SODIUM 20 MG
TABLET ORAL
Qty: 90 TABLET | Refills: 1 | Status: SHIPPED | OUTPATIENT
Start: 2021-07-07 | End: 2022-01-26

## 2021-07-07 NOTE — TELEPHONE ENCOUNTER
Medication:   Requested Prescriptions     Pending Prescriptions Disp Refills    pravastatin (PRAVACHOL) 20 MG tablet [Pharmacy Med Name: PRAVASTATIN SODIUM 20MG TABS] 90 tablet 1     Sig: TAKE 1 TABLET BY MOUTH ONE TIME A DAY        Last Filled:  11/4/20  Last appt: 5/5/2021   Next appt: 11/3/2021

## 2021-07-16 DIAGNOSIS — G47.00 INSOMNIA, UNSPECIFIED TYPE: ICD-10-CM

## 2021-07-16 RX ORDER — ZOLPIDEM TARTRATE 6.25 MG/1
TABLET, FILM COATED, EXTENDED RELEASE ORAL
Qty: 30 TABLET | Refills: 2 | Status: SHIPPED | OUTPATIENT
Start: 2021-07-16 | End: 2021-10-14

## 2021-10-27 ENCOUNTER — OFFICE VISIT (OUTPATIENT)
Dept: FAMILY MEDICINE CLINIC | Age: 66
End: 2021-10-27
Payer: COMMERCIAL

## 2021-10-27 VITALS
DIASTOLIC BLOOD PRESSURE: 74 MMHG | WEIGHT: 125.6 LBS | BODY MASS INDEX: 19.71 KG/M2 | HEART RATE: 90 BPM | HEIGHT: 67 IN | SYSTOLIC BLOOD PRESSURE: 130 MMHG | OXYGEN SATURATION: 96 %

## 2021-10-27 DIAGNOSIS — E78.2 MIXED HYPERLIPIDEMIA: ICD-10-CM

## 2021-10-27 DIAGNOSIS — G47.00 INSOMNIA, UNSPECIFIED TYPE: Primary | ICD-10-CM

## 2021-10-27 DIAGNOSIS — F41.8 PERFORMANCE ANXIETY: ICD-10-CM

## 2021-10-27 PROCEDURE — 99214 OFFICE O/P EST MOD 30 MIN: CPT | Performed by: FAMILY MEDICINE

## 2021-10-27 RX ORDER — ESZOPICLONE 2 MG/1
2 TABLET, FILM COATED ORAL NIGHTLY
Qty: 30 TABLET | Refills: 0 | Status: SHIPPED | OUTPATIENT
Start: 2021-10-27 | End: 2022-09-28 | Stop reason: SDUPTHER

## 2021-10-27 RX ORDER — OMEGA-3-ACID ETHYL ESTERS 1 G/1
4 CAPSULE, LIQUID FILLED ORAL DAILY
Qty: 360 CAPSULE | Refills: 3 | Status: SHIPPED | OUTPATIENT
Start: 2021-10-27

## 2021-10-27 RX ORDER — PROPRANOLOL HYDROCHLORIDE 20 MG/1
20 TABLET ORAL 2 TIMES DAILY PRN
Qty: 30 TABLET | Refills: 0 | Status: SHIPPED | OUTPATIENT
Start: 2021-10-27

## 2021-10-27 SDOH — ECONOMIC STABILITY: FOOD INSECURITY: WITHIN THE PAST 12 MONTHS, YOU WORRIED THAT YOUR FOOD WOULD RUN OUT BEFORE YOU GOT MONEY TO BUY MORE.: NEVER TRUE

## 2021-10-27 SDOH — ECONOMIC STABILITY: FOOD INSECURITY: WITHIN THE PAST 12 MONTHS, THE FOOD YOU BOUGHT JUST DIDN'T LAST AND YOU DIDN'T HAVE MONEY TO GET MORE.: NEVER TRUE

## 2021-10-27 ASSESSMENT — SOCIAL DETERMINANTS OF HEALTH (SDOH): HOW HARD IS IT FOR YOU TO PAY FOR THE VERY BASICS LIKE FOOD, HOUSING, MEDICAL CARE, AND HEATING?: NOT HARD AT ALL

## 2021-10-27 NOTE — PROGRESS NOTES
Alfa Flaherty (:  1955) is a 61 y.o. female,Established patient, here for evaluation of the following chief complaint(s): Anxiety (Performance anxiety)         ASSESSMENT/PLAN:  Natalie Odonnell was seen today for anxiety. Diagnoses and all orders for this visit:    Insomnia, unspecified type  -     eszopiclone (LUNESTA) 2 MG TABS; Take 1 tablet by mouth nightly. Not well controlled  Can try lunesta instead of ambien cr prn  Mixed hyperlipidemia  -     omega-3 acid ethyl esters (LOVAZA) 1 g capsule; Take 4 capsules by mouth daily  Cut to 4/day due to insurance. Last triglycerides level was great  Performance anxiety  Not well controlled  Trial of propranolol  Medication side affects and adverse reactions reviewed. Other orders  -     propranolol (INDERAL) 20 MG tablet; Take 1 tablet by mouth 2 times daily as needed (performance anxiety)         No follow-ups on file. Subjective   SUBJECTIVE/OBJECTIVE:  HPI   Pt is a of 61 y.o. female comes in today with   Chief Complaint   Patient presents with    Anxiety     Performance anxiety     Has a performance coming up and having a hard time relaxing. H/o add and ocd. Forgetting sequences. Not depressed. Vitals:    10/27/21 0926   BP: 130/74   Site: Right Upper Arm   Position: Sitting   Cuff Size: Small Adult   Pulse: 90   SpO2: 96%   Weight: 125 lb 9.6 oz (57 kg)   Height: 5' 7\" (1.702 m)       Review of Systems       Objective   Physical Exam         An electronic signature was used to authenticate this note.     --Renaldo Rapp MD

## 2021-12-19 ENCOUNTER — OFFICE VISIT (OUTPATIENT)
Dept: URGENT CARE | Age: 66
End: 2021-12-19
Payer: COMMERCIAL

## 2021-12-19 VITALS
HEART RATE: 76 BPM | HEIGHT: 67 IN | WEIGHT: 126 LBS | SYSTOLIC BLOOD PRESSURE: 109 MMHG | BODY MASS INDEX: 19.78 KG/M2 | DIASTOLIC BLOOD PRESSURE: 69 MMHG | OXYGEN SATURATION: 96 % | TEMPERATURE: 98 F

## 2021-12-19 DIAGNOSIS — L03.012 CELLULITIS OF LEFT THUMB: Primary | ICD-10-CM

## 2021-12-19 PROCEDURE — 99202 OFFICE O/P NEW SF 15 MIN: CPT

## 2021-12-19 RX ORDER — CLINDAMYCIN HYDROCHLORIDE 300 MG/1
300 CAPSULE ORAL 4 TIMES DAILY
Qty: 28 CAPSULE | Refills: 0 | Status: SHIPPED | OUTPATIENT
Start: 2021-12-19 | End: 2021-12-26

## 2021-12-19 ASSESSMENT — ENCOUNTER SYMPTOMS
CHEST TIGHTNESS: 0
SHORTNESS OF BREATH: 0
VOMITING: 0
DIARRHEA: 0
NAUSEA: 0

## 2021-12-19 NOTE — PROGRESS NOTES
Alfa Bynum (: 1955) is a 61 y.o. female here for evaluation of the following chief complaint(s):  Finger Injury (Infected Thumb on left hand)      SUBJECTIVE:  HPI   Pt presents today with c/o pain and swelling in left thumb. Pt does not recall any recent injury or infection to the thumb. She notes her thumb was initially tender and then over the last few days she has noticed it get progressively swollen. She denies any associated symptoms and has not taken any otc remedies for relief. Review of Systems   Constitutional: Negative for activity change, fatigue and fever. Respiratory: Negative for chest tightness and shortness of breath. Cardiovascular: Negative for chest pain and palpitations. Gastrointestinal: Negative for diarrhea, nausea and vomiting. Musculoskeletal: Negative for arthralgias and myalgias. Skin: Positive for wound. Neurological: Negative for dizziness and headaches. OBJECTIVE:  /69 (Site: Left Upper Arm, Position: Sitting, Cuff Size: Small Adult)   Pulse 76   Temp 98 °F (36.7 °C) (Oral)   Ht 5' 7\" (1.702 m)   Wt 126 lb (57.2 kg)   LMP  (LMP Unknown)   SpO2 96%   BMI 19.73 kg/m²    Physical Exam  Vitals reviewed. Constitutional:       Appearance: Normal appearance. She is normal weight. Cardiovascular:      Rate and Rhythm: Normal rate and regular rhythm. Pulses: Normal pulses. Heart sounds: Normal heart sounds. Pulmonary:      Effort: Pulmonary effort is normal.      Breath sounds: Normal breath sounds. Abdominal:      General: Abdomen is flat. Bowel sounds are normal.      Palpations: Abdomen is soft. Musculoskeletal:         General: Normal range of motion. Hands:       Cervical back: Normal range of motion. Comments: Left thumb edematous, erythematous and tender to palpation. ROM and sensation intact. Skin:     General: Skin is warm and dry.       Capillary Refill: Capillary refill takes less than 2 seconds. Neurological:      General: No focal deficit present. Mental Status: She is alert. Psychiatric:         Mood and Affect: Mood normal.         Behavior: Behavior normal.         Thought Content: Thought content normal.         Judgment: Judgment normal.         ASSESSMENT:  1. Cellulitis of left thumb  -     clindamycin (CLEOCIN) 300 MG capsule; Take 1 capsule by mouth 4 times daily for 7 days, Disp-28 capsule, R-0Normal      PLAN:  Take medication as prescribed. Discussed side effects of medication. Monitor for worsening signs or symptoms. Monitor for signs or symptoms of secondary infection. Do not scratch the areas. Keep nails trimmed short. Wash hands often with soap and water. Discussed precautions and indications for returning to clinic. Discussed precautions and indications for seeking ED care. Return if symptoms worsen or fail to improve.      Electronically signed by ASHLEY Macias CNP on 12/19/2021 at 12:36 PM.

## 2021-12-19 NOTE — PATIENT INSTRUCTIONS
Take medication as prescribed. Discussed side effects of medication. Monitor for worsening signs or symptoms. Monitor for signs or symptoms of secondary infection. Do not scratch the areas. Keep nails trimmed short. Wash hands often with soap and water. Discussed precautions and indications for returning to clinic. Discussed precautions and indications for seeking ED care. Patient Education        Cellulitis: Care Instructions  Your Care Instructions     Cellulitis is a skin infection caused by bacteria, most often strep or staph. It often occurs after a break in the skin from a scrape, cut, bite, or puncture, or after a rash. Cellulitis may be treated without doing tests to find out what caused it. But your doctor may do tests, if needed, to look for a specific bacteria, like methicillin-resistant Staphylococcus aureus (MRSA). The doctor has checked you carefully, but problems can develop later. If you notice any problems or new symptoms, get medical treatment right away. Follow-up care is a key part of your treatment and safety. Be sure to make and go to all appointments, and call your doctor if you are having problems. It's also a good idea to know your test results and keep a list of the medicines you take. How can you care for yourself at home? · Take your antibiotics as directed. Do not stop taking them just because you feel better. You need to take the full course of antibiotics. · Prop up the infected area on pillows to reduce pain and swelling. Try to keep the area above the level of your heart as often as you can. · If your doctor told you how to care for your wound, follow your doctor's instructions. If you did not get instructions, follow this general advice:  ? Wash the wound with clean water 2 times a day. Don't use hydrogen peroxide or alcohol, which can slow healing. ? You may cover the wound with a thin layer of petroleum jelly, such as Vaseline, and a nonstick bandage. ?  Apply more petroleum jelly and replace the bandage as needed. · Be safe with medicines. Take pain medicines exactly as directed. ? If the doctor gave you a prescription medicine for pain, take it as prescribed. ? If you are not taking a prescription pain medicine, ask your doctor if you can take an over-the-counter medicine. To prevent cellulitis in the future  · Try to prevent cuts, scrapes, or other injuries to your skin. Cellulitis most often occurs where there is a break in the skin. · If you get a scrape, cut, mild burn, or bite, wash the wound with clean water as soon as you can to help avoid infection. Don't use hydrogen peroxide or alcohol, which can slow healing. · If you have swelling in your legs (edema), support stockings and good skin care may help prevent leg sores and cellulitis. · Take care of your feet, especially if you have diabetes or other conditions that increase the risk of infection. Wear shoes and socks. Do not go barefoot. If you have athlete's foot or other skin problems on your feet, talk to your doctor about how to treat them. When should you call for help? Call your doctor now or seek immediate medical care if:    · You have signs that your infection is getting worse, such as:  ? Increased pain, swelling, warmth, or redness. ? Red streaks leading from the area. ? Pus draining from the area. ? A fever.     · You get a rash. Watch closely for changes in your health, and be sure to contact your doctor if:    · You do not get better as expected. Where can you learn more? Go to https://[employer]. org and sign in to your [employer] account. Enter R383 in the [employer] box to learn more about \"Cellulitis: Care Instructions. \"     If you do not have an account, please click on the \"Sign Up Now\" link. Current as of: March 3, 2021               Content Version: 13.0  © 5578-1014 Healthwise, Incorporated.    Care instructions adapted under license by [facility]. If you have questions about a medical condition or this instruction, always ask your healthcare professional. [address] any warranty or liability for your use of this information. Patient Education        Cellulitis: Care Instructions  Your Care Instructions     Cellulitis is a skin infection caused by bacteria, most often strep or staph. It often occurs after a break in the skin from a scrape, cut, bite, or puncture, or after a rash. Cellulitis may be treated without doing tests to find out what caused it. But your doctor may do tests, if needed, to look for a specific bacteria, like methicillin-resistant Staphylococcus aureus (MRSA). The doctor has checked you carefully, but problems can develop later. If you notice any problems or new symptoms, get medical treatment right away. Follow-up care is a key part of your treatment and safety. Be sure to make and go to all appointments, and call your doctor if you are having problems. It's also a good idea to know your test results and keep a list of the medicines you take. How can you care for yourself at home? · Take your antibiotics as directed. Do not stop taking them just because you feel better. You need to take the full course of antibiotics. · Prop up the infected area on pillows to reduce pain and swelling. Try to keep the area above the level of your heart as often as you can. · If your doctor told you how to care for your wound, follow your doctor's instructions. If you did not get instructions, follow this general advice:  ? Wash the wound with clean water 2 times a day. Don't use hydrogen peroxide or alcohol, which can slow healing. ? You may cover the wound with a thin layer of petroleum jelly, such as Vaseline, and a nonstick bandage. ? Apply more petroleum jelly and replace the bandage as needed. · Be safe with medicines. Take pain medicines exactly as directed.   ? If the doctor gave you a prescription medicine for pain, take it as prescribed. ? If you are not taking a prescription pain medicine, ask your doctor if you can take an over-the-counter medicine. To prevent cellulitis in the future  · Try to prevent cuts, scrapes, or other injuries to your skin. Cellulitis most often occurs where there is a break in the skin. · If you get a scrape, cut, mild burn, or bite, wash the wound with clean water as soon as you can to help avoid infection. Don't use hydrogen peroxide or alcohol, which can slow healing. · If you have swelling in your legs (edema), support stockings and good skin care may help prevent leg sores and cellulitis. · Take care of your feet, especially if you have diabetes or other conditions that increase the risk of infection. Wear shoes and socks. Do not go barefoot. If you have athlete's foot or other skin problems on your feet, talk to your doctor about how to treat them. When should you call for help? Call your doctor now or seek immediate medical care if:    · You have signs that your infection is getting worse, such as:  ? Increased pain, swelling, warmth, or redness. ? Red streaks leading from the area. ? Pus draining from the area. ? A fever.     · You get a rash. Watch closely for changes in your health, and be sure to contact your doctor if:    · You do not get better as expected. Where can you learn more? Go to https://S-cubismpedianeeweb.[facility]. org and sign in to your [employer] account. Enter F601 in the [facility] box to learn more about \"Cellulitis: Care Instructions. \"     If you do not have an account, please click on the \"Sign Up Now\" link. Current as of: March 3, 2021               Content Version: 13.0  © 5942-9249 Healthwise, [employer]. Care instructions adapted under license by [facility] ([facility]).  If you have questions about a medical condition or this instruction, always ask your healthcare professional. Gisel Noe disclaims any warranty or liability for your use of this information.

## 2021-12-27 ENCOUNTER — OFFICE VISIT (OUTPATIENT)
Dept: FAMILY MEDICINE CLINIC | Age: 66
End: 2021-12-27
Payer: COMMERCIAL

## 2021-12-27 VITALS
DIASTOLIC BLOOD PRESSURE: 62 MMHG | WEIGHT: 126 LBS | BODY MASS INDEX: 19.78 KG/M2 | SYSTOLIC BLOOD PRESSURE: 110 MMHG | TEMPERATURE: 97.3 F | OXYGEN SATURATION: 98 % | HEART RATE: 76 BPM | RESPIRATION RATE: 20 BRPM | HEIGHT: 67 IN

## 2021-12-27 DIAGNOSIS — M25.542 PAIN IN THUMB JOINT WITH MOVEMENT OF LEFT HAND: Primary | ICD-10-CM

## 2021-12-27 PROCEDURE — 99213 OFFICE O/P EST LOW 20 MIN: CPT | Performed by: NURSE PRACTITIONER

## 2021-12-27 ASSESSMENT — ENCOUNTER SYMPTOMS
EYE ITCHING: 0
COUGH: 0
COLOR CHANGE: 0
SINUS PAIN: 0
TROUBLE SWALLOWING: 0
BLOOD IN STOOL: 0
NAUSEA: 0
EYE PAIN: 0
CHEST TIGHTNESS: 0
VOICE CHANGE: 0
EYE REDNESS: 0
BACK PAIN: 0
ABDOMINAL PAIN: 0
STRIDOR: 0
RHINORRHEA: 0
CONSTIPATION: 0
SHORTNESS OF BREATH: 0
EYE DISCHARGE: 0
SORE THROAT: 0
VOMITING: 0
PHOTOPHOBIA: 0
CHOKING: 0
DIARRHEA: 0
SINUS PRESSURE: 0
WHEEZING: 0

## 2021-12-27 NOTE — PROGRESS NOTES
Neg Hx     Diabetes Neg Hx     Heart Failure Neg Hx     Hypertension Neg Hx     Migraines Neg Hx     Ovarian Cancer Neg Hx     Rashes/Skin Problems Neg Hx     Seizures Neg Hx     Thyroid Disease Neg Hx        Social History     Socioeconomic History    Marital status:      Spouse name: Not on file    Number of children: Not on file    Years of education: Not on file    Highest education level: Not on file   Occupational History    Not on file   Tobacco Use    Smoking status: Never Smoker    Smokeless tobacco: Never Used   Vaping Use    Vaping Use: Never used   Substance and Sexual Activity    Alcohol use: No     Alcohol/week: 0.0 standard drinks    Drug use: No    Sexual activity: Yes     Partners: Male   Other Topics Concern    Not on file   Social History Narrative    Not on file     Social Determinants of Health     Financial Resource Strain: Low Risk     Difficulty of Paying Living Expenses: Not hard at all   Food Insecurity: No Food Insecurity    Worried About Running Out of Food in the Last Year: Never true    [place] of Food in the Last Year: Never true   Transportation Needs:     Lack of Transportation (Medical): Not on file    Lack of Transportation (Non-Medical):  Not on file   Physical Activity:     Days of Exercise per Week: Not on file    Minutes of Exercise per Session: Not on file   Stress:     Feeling of Stress : Not on file   Social Connections:     Frequency of Communication with Friends and Family: Not on file    Frequency of Social Gatherings with Friends and Family: Not on file    Attends Faith Services: Not on file    Active Member of Clubs or Organizations: Not on file    Attends Club or Organization Meetings: Not on file    Marital Status: Not on file   Intimate Partner Violence:     Fear of Current or Ex-Partner: Not on file    Emotionally Abused: Not on file    Physically Abused: Not on file    Sexually Abused: Not on file   Housing Stability:     Unable to Pay for Housing in the Last Year: Not on file    Number of Places Lived in the Last Year: Not on file    Unstable Housing in the Last Year: Not on file       Prior to Visit Medications    Medication Sig Taking? Authorizing Provider   eszopiclone (LUNESTA) 2 MG TABS Take 1 tablet by mouth nightly.  Yes Nai Pompa MD   propranolol (INDERAL) 20 MG tablet Take 1 tablet by mouth 2 times daily as needed (performance anxiety) Yes Nai Pompa MD   omega-3 acid ethyl esters (LOVAZA) 1 g capsule Take 4 capsules by mouth daily Yes Nai Pompa MD   pravastatin (PRAVACHOL) 20 MG tablet TAKE 1 TABLET BY MOUTH ONE TIME A DAY Yes Nai Pompa MD   Diclofenac Sodium POWD 2 g by Does not apply route 4 times daily Formula 5D Diclo 3% Prilo 2% Lido 2% Yes Nai Pompa MD   ARTIFICIAL TEAR OP Apply to eye as needed Yes Historical Provider, MD   cycloSPORINE (RESTASIS OP) Apply 1 drop to eye 2 times daily Each eye Yes Historical Provider, MD   Calcium-Magnesium-Vitamin D (CALCIUM 1200+D3 PO) Take by mouth 2 times daily Yes Historical Provider, MD   Calcium Carbonate Antacid (TUMS ULTRA 1000 PO) Take by mouth as needed Yes Historical Provider, MD   LACTOBACILLUS PROBIOTIC PO Take by mouth every evening Yes Historical Provider, MD   NAPROXEN PO Take 220 mg by mouth 2 times daily Yes Historical Provider, MD   Biotin 2500 MCG CAPS Take by mouth daily Yes Historical Provider, MD   acetaminophen (TYLENOL) 500 MG tablet Take 500 mg by mouth every 6 hours as needed for Pain Yes Historical Provider, MD   Multiple Vitamins-Minerals (THERAPEUTIC MULTIVITAMIN-MINERALS) tablet Take 1 tablet by mouth daily Yes Historical Provider, MD   ondansetron (ZOFRAN) 4 MG tablet Take 1 tablet by mouth every 8 hours as needed for Nausea or Vomiting Yes Nai Pompa MD   MINOXIDIL, TOPICAL, 5 % SOLN Apply topically daily  Yes Historical Provider, MD   omeprazole (PRILOSEC) 20 MG delayed release capsule rhythm. Heart sounds: Normal heart sounds. No murmur heard. No friction rub. Pulmonary:      Effort: Pulmonary effort is normal. No respiratory distress. Breath sounds: Normal breath sounds. No stridor. No decreased breath sounds, wheezing, rhonchi or rales. Musculoskeletal:      Right hand: Normal.      Left hand: Decreased range of motion. Arms:       Cervical back: Normal range of motion. Comments: No tenderness to deep palpation, hard nodule palpated on joint unsure if arthritis, bone spur, or anatomy. Advised if continues to bother her can get XRAY. Advised if redness returns may need more abx     Lymphadenopathy:      Cervical: No cervical adenopathy. Skin:     General: Skin is warm and dry. Capillary Refill: Capillary refill takes less than 2 seconds. Findings: No rash. Neurological:      Mental Status: She is alert and oriented to person, place, and time. Sensory: Sensation is intact. Motor: Motor function is intact. Coordination: Coordination normal.   Psychiatric:         Attention and Perception: Attention and perception normal.         Mood and Affect: Mood normal.         Speech: Speech normal.         Behavior: Behavior normal. Behavior is cooperative. Thought Content: Thought content normal.         Cognition and Memory: Cognition normal.         Judgment: Judgment normal.       ASSESSMENT/PLAN:    1. Pain in thumb joint with movement of left hand    Overall thumb looks good. There was a palpated nodule at thumb joint. Advised if this gets bigger or bothers her more she may need an x-ray or referral to hand specialist.  Discussed possibility of it being a cyst versus arthritis. Was hard when palpated. Color of hands within normal limits and bilateral.  Cap refill within normal limits. Follow up if symptoms do not improve or worsen. If the patient becomes short of breath go straight to the ER or call 911.     Return if symptoms worsen or fail to improve.

## 2022-01-04 NOTE — TELEPHONE ENCOUNTER
Medication:   Requested Prescriptions     Pending Prescriptions Disp Refills    Diclofenac Sodium POWD 240 g 11     Si g by Does not apply route 4 times daily Formula 5D Diclo 3% Prilo 2% Lido 2%        Last Filled:  12/15/2020  Last appt: 2021   Next appt: 2022

## 2022-01-07 NOTE — TELEPHONE ENCOUNTER
Pt stated that the medication was sent over wrong. Pt stated she is going to call her pharmacy to get the right formula. Pt stated the more expensive version was sent over.      Please advise pt     Phone # [phone]    Diclofenac Sodium POWD

## 2022-01-07 NOTE — TELEPHONE ENCOUNTER
Pharmacy called to get the prescription formula changed for pt to be able to afford. Requesting to get rid of prilo and increase lido to 4 % and diclo will stay the same @ 2%.

## 2022-01-11 NOTE — TELEPHONE ENCOUNTER
[address] is calling to check on the status of this medication. Please change and advise.

## 2022-01-26 DIAGNOSIS — E78.2 MIXED HYPERLIPIDEMIA: ICD-10-CM

## 2022-01-26 RX ORDER — PRAVASTATIN SODIUM 20 MG
TABLET ORAL
Qty: 90 TABLET | Refills: 1 | Status: SHIPPED | OUTPATIENT
Start: 2022-01-26 | End: 2022-04-27 | Stop reason: SDUPTHER

## 2022-01-26 NOTE — TELEPHONE ENCOUNTER
Medication:   Requested Prescriptions     Pending Prescriptions Disp Refills    pravastatin (PRAVACHOL) 20 MG tablet [Pharmacy Med Name: PRAVASTATIN SODIUM 20MG TABS] 90 tablet 1     Sig: TAKE 1 TABLET BY MOUTH ONE TIME A DAY        Last Filled: 7/7/2021   Last appt: 12/27/2021   Next appt: 4/27/2022

## 2022-02-09 ENCOUNTER — HOSPITAL ENCOUNTER (OUTPATIENT)
Dept: WOMENS IMAGING | Age: 67
Discharge: HOME | End: 2022-02-09
Payer: COMMERCIAL

## 2022-02-09 DIAGNOSIS — Z12.31 VISIT FOR SCREENING MAMMOGRAM: ICD-10-CM

## 2022-02-09 PROCEDURE — 77063 BREAST TOMOSYNTHESIS BI: CPT

## 2022-02-10 ENCOUNTER — OFFICE VISIT (OUTPATIENT)
Dept: FAMILY MEDICINE CLINIC | Age: 67
End: 2022-02-10
Payer: COMMERCIAL

## 2022-02-10 VITALS
WEIGHT: 127.6 LBS | BODY MASS INDEX: 20.03 KG/M2 | SYSTOLIC BLOOD PRESSURE: 120 MMHG | DIASTOLIC BLOOD PRESSURE: 68 MMHG | OXYGEN SATURATION: 98 % | HEIGHT: 67 IN | HEART RATE: 77 BPM

## 2022-02-10 DIAGNOSIS — M54.41 ACUTE BILATERAL LOW BACK PAIN WITH RIGHT-SIDED SCIATICA: Primary | ICD-10-CM

## 2022-02-10 PROCEDURE — 99213 OFFICE O/P EST LOW 20 MIN: CPT | Performed by: FAMILY MEDICINE

## 2022-02-10 NOTE — PATIENT INSTRUCTIONS
Patient Education        Low Back Pain: Exercises  Introduction  Here are some examples of exercises for you to try. The exercises may be suggested for a condition or for rehabilitation. Start each exercise slowly. Ease off the exercises if you start to have pain. You will be told when to start these exercises and which ones will work best for you. How to do the exercises  Press-up    1. Lie on your stomach, supporting your body with your forearms. 2. Press your elbows down into the floor to raise your upper back. As you do this, relax your stomach muscles and allow your back to arch without using your back muscles. As your press up, do not let your hips or pelvis come off the floor. 3. Hold for 15 to 30 seconds, then relax. 4. Repeat 2 to 4 times. Alternate arm and leg (bird dog) exercise    Do this exercise slowly. Try to keep your body straight at all times, and do not let one hip drop lower than the other. 1. Start on the floor, on your hands and knees. 2. Tighten your belly muscles. 3. Raise one leg off the floor, and hold it straight out behind you. Be careful not to let your hip drop down, because that will twist your trunk. 4. Hold for about 6 seconds, then lower your leg and switch to the other leg. 5. Repeat 8 to 12 times on each leg. 6. Over time, work up to holding for 10 to 30 seconds each time. 7. If you feel stable and secure with your leg raised, try raising the opposite arm straight out in front of you at the same time. Knee-to-chest exercise    1. Lie on your back with your knees bent and your feet flat on the floor. 2. Bring one knee to your chest, keeping the other foot flat on the floor (or keeping the other leg straight, whichever feels better on your lower back). 3. Keep your lower back pressed to the floor. Hold for at least 15 to 30 seconds. 4. Relax, and lower the knee to the starting position. 5. Repeat with the other leg. Repeat 2 to 4 times with each leg.   6. To get more stretch, put your other leg flat on the floor while pulling your knee to your chest.  Curl-ups    1. Lie on the floor on your back with your knees bent at a 90-degree angle. Your feet should be flat on the floor, about 12 inches from your buttocks. 2. Cross your arms over your chest. If this bothers your neck, try putting your hands behind your neck (not your head), with your elbows spread apart. 3. Slowly tighten your belly muscles and raise your shoulder blades off the floor. 4. Keep your head in line with your body, and do not press your chin to your chest.  5. Hold this position for 1 or 2 seconds, then slowly lower yourself back down to the floor. 6. Repeat 8 to 12 times. Pelvic tilt exercise    1. Lie on your back with your knees bent. 2. \"Brace\" your stomach. This means to tighten your muscles by pulling in and imagining your belly button moving toward your spine. You should feel like your back is pressing to the floor and your hips and pelvis are rocking back. 3. Hold for about 6 seconds while you breathe smoothly. 4. Repeat 8 to 12 times. Heel dig bridging    1. Lie on your back with both knees bent and your ankles bent so that only your heels are digging into the floor. Your knees should be bent about 90 degrees. 2. Then push your heels into the floor, squeeze your buttocks, and lift your hips off the floor until your shoulders, hips, and knees are all in a straight line. 3. Hold for about 6 seconds as you continue to breathe normally, and then slowly lower your hips back down to the floor and rest for up to 10 seconds. 4. Do 8 to 12 repetitions. Hamstring stretch in doorway    1. Lie on your back in a doorway, with one leg through the open door. 2. Slide your leg up the wall to straighten your knee. You should feel a gentle stretch down the back of your leg. 3. Hold the stretch for at least 15 to 30 seconds. Do not arch your back, point your toes, or bend either knee.  Keep one heel touching the floor and the other heel touching the wall. 4. Repeat with your other leg. 5. Do 2 to 4 times for each leg. Hip flexor stretch    1. Kneel on the floor with one knee bent and one leg behind you. Place your forward knee over your foot. Keep your other knee touching the floor. 2. Slowly push your hips forward until you feel a stretch in the upper thigh of your rear leg. 3. Hold the stretch for at least 15 to 30 seconds. Repeat with your other leg. 4. Do 2 to 4 times on each side. Wall sit    1. Stand with your back 10 to 12 inches away from a wall. 2. Lean into the wall until your back is flat against it. 3. Slowly slide down until your knees are slightly bent, pressing your lower back into the wall. 4. Hold for about 6 seconds, then slide back up the wall. 5. Repeat 8 to 12 times. Follow-up care is a key part of your treatment and safety. Be sure to make and go to all appointments, and call your doctor if you are having problems. It's also a good idea to know your test results and keep a list of the medicines you take. Where can you learn more? Go to https://[employer]. org and sign in to your Viki account. Enter M042 in the [facility] box to learn more about \"Low Back Pain: Exercises. \"     If you do not have an account, please click on the \"Sign Up Now\" link. Current as of: July 1, 2021               Content Version: 13.1  © 2006-2021 Healthwise, Incorporated. Care instructions adapted under license by [facility] ([facility]). If you have questions about a medical condition or this instruction, always ask your healthcare professional. [place] [address] any warranty or liability for your use of this information.

## 2022-02-10 NOTE — PROGRESS NOTES
Alfa Lozoya (:  1955) is a 61 y.o. female,Established patient, here for evaluation of the following chief complaint(s):  Lower Back Pain (Significantly improved.)         ASSESSMENT/PLAN:  Mikala Herrmann was seen today for lower back pain. Diagnoses and all orders for this visit:    Acute bilateral low back pain with right-sided sciatica    Reviewed HEP  Prn otc analgesics  Advised to call back directly if there are further questions, or if these symptoms fail to improve as anticipated or worsen. No follow-ups on file. Subjective   SUBJECTIVE/OBJECTIVE:  HPI   Pt is a of 61 y.o. female comes in today with   Chief Complaint   Patient presents with    Lower Back Pain     Significantly improved. Had really bad back pain sat am.  Pain into right leg and gave out. Next day mostly buttock ache. Vitals:    02/10/22 0931   BP: 120/68   Site: Right Upper Arm   Position: Sitting   Cuff Size: Small Adult   Pulse: 77   SpO2: 98%   Weight: 127 lb 9.6 oz (57.9 kg)   Height: 5' 7\" (1.702 m)        Review of Systems       Objective   Physical Exam         An electronic signature was used to authenticate this note.     --Jennifer Moreno MD

## 2022-02-10 NOTE — LETTER
[facility]  [address] [place] [address],[address]  Phone: [phone]  Fax: [phone]    Kristi Aviles MD        February 10, 2022     Patient: Lynn Carrillo   YOB: 1955   Date of Visit: 2/10/2022       To Whom It May Concern: It is my medical opinion that Lynn Carrillo should be excused from work 2/6/22 due to injury. If you have any questions or concerns, please don't hesitate to call.     Sincerely,        Kristi Aviles MD

## 2022-04-08 RX ORDER — ONDANSETRON 4 MG/1
4 TABLET, FILM COATED ORAL EVERY 8 HOURS PRN
Qty: 15 TABLET | Refills: 1 | Status: SHIPPED | OUTPATIENT
Start: 2022-04-08

## 2022-04-08 NOTE — TELEPHONE ENCOUNTER
Zofran 4 mg  [id number]  Please call patient when this is ready

## 2022-04-13 ENCOUNTER — TELEPHONE (OUTPATIENT)
Dept: GYNECOLOGY | Age: 67
End: 2022-04-13

## 2022-04-13 NOTE — TELEPHONE ENCOUNTER
Transvaginal US and Complete was done last year. She was under the impression that she's supposed to have it done again before the end of the year. She wants to know exactly what she needs to have done. And if an order than be placed.        Please contact patient to discuss at [phone]

## 2022-04-14 DIAGNOSIS — N85.9 FLUID IN ENDOMETRIAL CAVITY: Primary | ICD-10-CM

## 2022-04-14 NOTE — TELEPHONE ENCOUNTER
Tell patient that she can just do the non ob transvaginal ultrasound and that would be good. Tell patient that I ordered this for her. She can do  this a year from her last one.

## 2022-04-15 ENCOUNTER — TELEPHONE (OUTPATIENT)
Dept: GYNECOLOGY | Age: 67
End: 2022-04-15

## 2022-04-15 NOTE — TELEPHONE ENCOUNTER
Patient is requesting a \"dexa scan\" order to be placed, call leave message on voice mail when done, she can be reached at [phone]

## 2022-04-20 ENCOUNTER — HOSPITAL ENCOUNTER (OUTPATIENT)
Dept: ULTRASOUND IMAGING | Age: 67
Discharge: HOME | End: 2022-04-20
Payer: COMMERCIAL

## 2022-04-20 DIAGNOSIS — Z09 FOLLOW-UP EXAM, LESS THAN 3 MONTHS SINCE PREVIOUS EXAM: ICD-10-CM

## 2022-04-20 DIAGNOSIS — N85.9 FLUID IN ENDOMETRIAL CAVITY: ICD-10-CM

## 2022-04-20 PROCEDURE — 76830 TRANSVAGINAL US NON-OB: CPT

## 2022-04-20 PROCEDURE — 76856 US EXAM PELVIC COMPLETE: CPT

## 2022-04-25 ENCOUNTER — TELEPHONE (OUTPATIENT)
Dept: FAMILY MEDICINE CLINIC | Age: 67
End: 2022-04-25

## 2022-04-25 DIAGNOSIS — R73.03 PRE-DIABETES: Primary | ICD-10-CM

## 2022-04-25 DIAGNOSIS — E78.2 MIXED HYPERLIPIDEMIA: ICD-10-CM

## 2022-04-25 NOTE — TELEPHONE ENCOUNTER
----- Message from Lam Johnson sent at 4/25/2022  8:51 AM EDT -----  Subject: Referral Request    QUESTIONS   Reason for referral request? Pt wants to know if she needs to get blood   work before being seen on 4/27. Has the physician seen you for this condition before? No   Preferred Specialist (if applicable)? Do you already have an appointment scheduled? Additional Information for Provider?   ---------------------------------------------------------------------------  --------------  CALL BACK INFO  What is the best way for the office to contact you? OK to leave message on   voicemail  Preferred Call Back Phone Number? [phone]  ---------------------------------------------------------------------------  --------------  SCRIPT ANSWERS  Relationship to Patient?  Self

## 2022-04-26 DIAGNOSIS — E78.2 MIXED HYPERLIPIDEMIA: ICD-10-CM

## 2022-04-26 DIAGNOSIS — R73.03 PRE-DIABETES: ICD-10-CM

## 2022-04-26 LAB
A/G RATIO: 2.3 (ref 1.1–2.2)
ALBUMIN SERPL-MCNC: 4.6 G/DL (ref 3.4–5)
ALP BLD-CCNC: 49 U/L (ref 40–129)
ALT SERPL-CCNC: 46 U/L (ref 10–40)
ANION GAP SERPL CALCULATED.3IONS-SCNC: 13 MMOL/L (ref 3–16)
AST SERPL-CCNC: 27 U/L (ref 15–37)
BILIRUB SERPL-MCNC: 0.5 MG/DL (ref 0–1)
BUN BLDV-MCNC: 20 MG/DL (ref 7–20)
CALCIUM SERPL-MCNC: 9.3 MG/DL (ref 8.3–10.6)
CHLORIDE BLD-SCNC: 100 MMOL/L (ref 99–110)
CHOLESTEROL, TOTAL: 234 MG/DL (ref 0–199)
CO2: 27 MMOL/L (ref 21–32)
CREAT SERPL-MCNC: 0.6 MG/DL (ref 0.6–1.2)
GFR AFRICAN AMERICAN: >60
GFR NON-AFRICAN AMERICAN: >60
GLUCOSE BLD-MCNC: 101 MG/DL (ref 70–99)
HDLC SERPL-MCNC: 92 MG/DL (ref 40–60)
LDL CHOLESTEROL CALCULATED: 129 MG/DL
POTASSIUM SERPL-SCNC: 4.1 MMOL/L (ref 3.5–5.1)
SODIUM BLD-SCNC: 140 MMOL/L (ref 136–145)
TOTAL PROTEIN: 6.6 G/DL (ref 6.4–8.2)
TRIGL SERPL-MCNC: 64 MG/DL (ref 0–150)
VLDLC SERPL CALC-MCNC: 13 MG/DL

## 2022-04-27 ENCOUNTER — OFFICE VISIT (OUTPATIENT)
Dept: FAMILY MEDICINE CLINIC | Age: 67
End: 2022-04-27
Payer: COMMERCIAL

## 2022-04-27 VITALS
HEIGHT: 67 IN | DIASTOLIC BLOOD PRESSURE: 70 MMHG | BODY MASS INDEX: 20.03 KG/M2 | SYSTOLIC BLOOD PRESSURE: 108 MMHG | HEART RATE: 71 BPM | WEIGHT: 127.6 LBS | OXYGEN SATURATION: 97 %

## 2022-04-27 DIAGNOSIS — G47.00 INSOMNIA, UNSPECIFIED TYPE: ICD-10-CM

## 2022-04-27 DIAGNOSIS — E78.2 MIXED HYPERLIPIDEMIA: ICD-10-CM

## 2022-04-27 DIAGNOSIS — R73.03 PRE-DIABETES: Primary | ICD-10-CM

## 2022-04-27 LAB
ESTIMATED AVERAGE GLUCOSE: 116.9 MG/DL
HBA1C MFR BLD: 5.7 %

## 2022-04-27 PROCEDURE — 99213 OFFICE O/P EST LOW 20 MIN: CPT | Performed by: FAMILY MEDICINE

## 2022-04-27 RX ORDER — PRAVASTATIN SODIUM 20 MG
20 TABLET ORAL DAILY
Qty: 90 TABLET | Refills: 1 | Status: SHIPPED | OUTPATIENT
Start: 2022-04-27

## 2022-04-27 ASSESSMENT — PATIENT HEALTH QUESTIONNAIRE - PHQ9
7. TROUBLE CONCENTRATING ON THINGS, SUCH AS READING THE NEWSPAPER OR WATCHING TELEVISION: 0
SUM OF ALL RESPONSES TO PHQ QUESTIONS 1-9: 5
SUM OF ALL RESPONSES TO PHQ QUESTIONS 1-9: 5
2. FEELING DOWN, DEPRESSED OR HOPELESS: 0
10. IF YOU CHECKED OFF ANY PROBLEMS, HOW DIFFICULT HAVE THESE PROBLEMS MADE IT FOR YOU TO DO YOUR WORK, TAKE CARE OF THINGS AT HOME, OR GET ALONG WITH OTHER PEOPLE: 1
SUM OF ALL RESPONSES TO PHQ9 QUESTIONS 1 & 2: 0
SUM OF ALL RESPONSES TO PHQ QUESTIONS 1-9: 5
8. MOVING OR SPEAKING SO SLOWLY THAT OTHER PEOPLE COULD HAVE NOTICED. OR THE OPPOSITE, BEING SO FIGETY OR RESTLESS THAT YOU HAVE BEEN MOVING AROUND A LOT MORE THAN USUAL: 0
6. FEELING BAD ABOUT YOURSELF - OR THAT YOU ARE A FAILURE OR HAVE LET YOURSELF OR YOUR FAMILY DOWN: 0
5. POOR APPETITE OR OVEREATING: 0
3. TROUBLE FALLING OR STAYING ASLEEP: 3
9. THOUGHTS THAT YOU WOULD BE BETTER OFF DEAD, OR OF HURTING YOURSELF: 0
SUM OF ALL RESPONSES TO PHQ QUESTIONS 1-9: 5
1. LITTLE INTEREST OR PLEASURE IN DOING THINGS: 0
4. FEELING TIRED OR HAVING LITTLE ENERGY: 2

## 2022-04-27 NOTE — PROGRESS NOTES
Alfa Pearllosser (:  1955) is a 61 y.o. female,Established patient, here for evaluation of the following chief complaint(s):  6 Month Follow-Up         ASSESSMENT/PLAN:  Mary Ann Reza was seen today for 6 month follow-up. Diagnoses and all orders for this visit:    Pre-diabetes  -     Hemoglobin A1C; Future  blood sugar up a little. Will be working more on carb restriction. Mixed hyperlipidemia  -     pravastatin (PRAVACHOL) 20 MG tablet; Take 1 tablet by mouth daily  -     Lipid Panel; Future  -     Comprehensive Metabolic Panel; Future  Stable on statin and lovaza   insomnia  Stable on lunesta    No follow-ups on file. Subjective   SUBJECTIVE/OBJECTIVE:  HPI   Pt is a of 61 y.o. female comes in today with   Chief Complaint   Patient presents with    6 Month Follow-Up     Diet has not been good. More carbs. Only takes lunesta if she really needs it. Vitals:    22 0805   BP: 108/70   Site: Right Upper Arm   Position: Sitting   Cuff Size: Small Adult   Pulse: 71   SpO2: 97%   Weight: 127 lb 9.6 oz (57.9 kg)   Height: 5' 7\" (1.702 m)       Past Medical History:Reviewed  Medications:Reviewed. Allergies   Allergen Reactions    Bactrim [Sulfamethoxazole-Trimethoprim] Rash    Doxycycline Nausea And Vomiting      Social hx:Reviewed. Social History     Tobacco Use   Smoking Status Never Smoker   Smokeless Tobacco Never Used          Review of Systems       Objective   Physical Exam  Constitutional:       Appearance: Normal appearance. Cardiovascular:      Rate and Rhythm: Normal rate and regular rhythm. Heart sounds: No murmur heard. Pulmonary:      Effort: Pulmonary effort is normal.      Breath sounds: Normal breath sounds. Skin:     General: Skin is warm. Neurological:      Mental Status: She is alert. thyroid normal         An electronic signature was used to authenticate this note.     --Michelle Jackson MD

## 2022-05-10 ENCOUNTER — OFFICE VISIT (OUTPATIENT)
Dept: GYNECOLOGY | Age: 67
End: 2022-05-10
Payer: COMMERCIAL

## 2022-05-10 VITALS
SYSTOLIC BLOOD PRESSURE: 110 MMHG | DIASTOLIC BLOOD PRESSURE: 68 MMHG | BODY MASS INDEX: 20.78 KG/M2 | WEIGHT: 132.4 LBS | HEIGHT: 67 IN | OXYGEN SATURATION: 99 % | RESPIRATION RATE: 17 BRPM | HEART RATE: 73 BPM

## 2022-05-10 DIAGNOSIS — N95.2 VAGINAL ATROPHY: ICD-10-CM

## 2022-05-10 DIAGNOSIS — M85.88 OSTEOPENIA OF OTHER SITE: ICD-10-CM

## 2022-05-10 DIAGNOSIS — Z01.419 WELL WOMAN EXAM WITH ROUTINE GYNECOLOGICAL EXAM: Primary | ICD-10-CM

## 2022-05-10 PROCEDURE — 99396 PREV VISIT EST AGE 40-64: CPT | Performed by: OBSTETRICS & GYNECOLOGY

## 2022-05-10 RX ORDER — ESTRADIOL 10 UG/1
10 INSERT VAGINAL
Qty: 24 TABLET | Refills: 3 | Status: SHIPPED | OUTPATIENT
Start: 2022-05-12 | End: 2022-06-11

## 2022-05-10 RX ORDER — ESTRADIOL 10 UG/1
10 INSERT VAGINAL
Qty: 24 TABLET | Refills: 3 | Status: SHIPPED | OUTPATIENT
Start: 2022-05-12 | End: 2022-05-10

## 2022-05-15 ASSESSMENT — ENCOUNTER SYMPTOMS
GASTROINTESTINAL NEGATIVE: 1
EYES NEGATIVE: 1
RESPIRATORY NEGATIVE: 1

## 2022-05-15 NOTE — PROGRESS NOTES
Subjective:      Patient ID: Raciel Russo is a 61 y.o. female. Patient is here for annual. Patient is feeling stable. Gynecologic Exam        Review of Systems   Constitutional: Negative. HENT: Negative. Eyes: Negative. Respiratory: Negative. Cardiovascular: Negative. Gastrointestinal: Negative. Genitourinary: Negative. Musculoskeletal: Negative. Skin: Negative. Neurological: Negative. Psychiatric/Behavioral: Negative.       Date of Birth 1955  Past Medical History:   Diagnosis Date    Acne rosacea 2011    Anxiety     Colon polyps     Diabetes mellitus ([address]     pre-diabetic (diet controlled)    Diabetic eye exam ([address] .9.15    [facility]    Diabetic eye exam ([facility] 16    [facility]     Diverticulosis 2013    Dry eyes     Gastroesophageal reflux disease 5/15/2018    Hyperlipidemia     Insufficiency of tear film of both eyes 2016    Mild episode of recurrent major depressive disorder ([facility] 2019    Nuclear sclerosis of both eyes 2016    Pneumothorax     Pregnancy     grava - 2, para - 2, uterine rupture with stillbirth    Uterus didelphus      Past Surgical History:   Procedure Laterality Date     SECTION      2    COLONOSCOPY      COLONOSCOPY N/A 2020    COLONOSCOPY POLYPECTOMY SNARE/COLD BIOPSY performed by Adalgisa Neri MD at [facility] 149      septum removed    WISDOM TOOTH EXTRACTION      x 4     OB History    Para Term  AB Living   4 4 2     1   SAB IAB Ectopic Molar Multiple Live Births             1      # Outcome Date GA Lbr Guillermo/2nd Weight Sex Delivery Anes PTL Lv   4 Para 1996        FD   3 Para 94   5 lb (2.268 kg) F CS-Classical EPI N ANDI   2 Term            1 Term              Social History     Socioeconomic History    Marital status:      Spouse name: Not on file    Number of children: Not on file    Years of education: Not on file    Highest education level: Not on file   Occupational History    Not on file   Tobacco Use    Smoking status: Never Smoker    Smokeless tobacco: Never Used   Vaping Use    Vaping Use: Never used   Substance and Sexual Activity    Alcohol use: No     Alcohol/week: 0.0 standard drinks    Drug use: No    Sexual activity: Yes     Partners: Male   Other Topics Concern    Not on file   Social History Narrative    Not on file     Social Determinants of Health     Financial Resource Strain: Low Risk     Difficulty of Paying Living Expenses: Not hard at all   Food Insecurity: No Food Insecurity    Worried About Running Out of Food in the Last Year: Never true    [place] of Food in the Last Year: Never true   Transportation Needs:     Lack of Transportation (Medical): Not on file    Lack of Transportation (Non-Medical):  Not on file   Physical Activity:     Days of Exercise per Week: Not on file    Minutes of Exercise per Session: Not on file   Stress:     Feeling of Stress : Not on file   Social Connections:     Frequency of Communication with Friends and Family: Not on file    Frequency of Social Gatherings with Friends and Family: Not on file    Attends Gnosticism Services: Not on file    Active Member of [address] [place] [address] [employer] or Organizations: Not on file    Attends Club or Organization Meetings: Not on file    Marital Status: Not on file   Intimate Partner Violence:     Fear of Current or Ex-Partner: Not on file    Emotionally Abused: Not on file    Physically Abused: Not on file    Sexually Abused: Not on file   Housing Stability:     Unable to Pay for Housing in the Last Year: Not on file    Number of [place] in the Last Year: Not on file    Unstable Housing in the Last Year: Not on file     Allergies   Allergen Reactions    Bactrim [Sulfamethoxazole-Trimethoprim] Rash    Doxycycline Nausea And Vomiting     Outpatient Medications Marked as Taking for the 5/10/22 encounter (Office Visit) with Zora Noble MD   Medication Sig Dispense Refill    Estradiol (VAGIFEM) 10 MCG TABS vaginal tablet Place 1 tablet vaginally Twice a Week 24 tablet 3    pravastatin (PRAVACHOL) 20 MG tablet Take 1 tablet by mouth daily 90 tablet 1    ondansetron (ZOFRAN) 4 MG tablet Take 1 tablet by mouth every 8 hours as needed for Nausea or Vomiting 15 tablet 1    Diclofenac Sodium POWD 2 g by Does not apply route 4 times daily Formula 5D Diclo 3% Prilo 2% Lido 2% (Patient taking differently: 2 g by Does not apply route 4 times daily Diclo 3% Lido 4%) 240 g 11    eszopiclone (LUNESTA) 2 MG TABS Take 1 tablet by mouth nightly. 30 tablet 0    propranolol (INDERAL) 20 MG tablet Take 1 tablet by mouth 2 times daily as needed (performance anxiety) 30 tablet 0    omega-3 acid ethyl esters (LOVAZA) 1 g capsule Take 4 capsules by mouth daily 360 capsule 3    ARTIFICIAL TEAR OP Apply to eye as needed      cycloSPORINE (RESTASIS OP) Apply 1 drop to eye 2 times daily Each eye      Biotin 2500 MCG CAPS Take by mouth daily      Multiple Vitamins-Minerals (THERAPEUTIC MULTIVITAMIN-MINERALS) tablet Take 1 tablet by mouth daily      MINOXIDIL, TOPICAL, 5 % SOLN Apply topically daily       omeprazole (PRILOSEC) 20 MG delayed release capsule Take 20 mg by mouth daily      docusate sodium (COLACE) 100 MG capsule Take 100 mg by mouth 2 times daily      Cholecalciferol (VITAMIN D) 2000 UNITS CAPS capsule Take by mouth      naproxen (NAPROSYN) 250 MG tablet Take 500 mg by mouth 2 times daily (with meals)       Melatonin 3 MG TABS Take 3 mg by mouth daily. [address] ([employer] WOM[employer]) MISC by Does not apply route.  As directed        Family History   Problem Relation Age of Onset    Stroke Mother     Glaucoma Mother     Cataracts Mother     High Cholesterol Mother     High Blood Pressure Mother     Heart Disease Mother     High Blood Pressure Father     Heart Disease Paternal Stephanie Churchman Cancer Paternal Uncle     Cancer Other     Stroke Maternal Uncle     Heart Disease Maternal Uncle     Mental Illness Maternal Grandmother     Breast Cancer Maternal Grandmother     No Known Problems Sister     No Known Problems Brother     Breast Cancer Maternal Aunt     No Known Problems Paternal Aunt     No Known Problems Maternal Grandfather     No Known Problems Paternal Grandmother     No Known Problems Paternal Grandfather     Rheum Arthritis Neg Hx     Osteoarthritis Neg Hx     Asthma Neg Hx     Diabetes Neg Hx     Heart Failure Neg Hx     Hypertension Neg Hx     Migraines Neg Hx     Ovarian Cancer Neg Hx     Rashes/Skin Problems Neg Hx     Seizures Neg Hx     Thyroid Disease Neg Hx      /68 (Site: Right Upper Arm, Position: Sitting, Cuff Size: Medium Adult)   Pulse 73   Resp 17   Ht 5' 7\" (1.702 m)   Wt 132 lb 6.4 oz (60.1 kg)   LMP  (LMP Unknown)   SpO2 99%   BMI 20.74 kg/m²       Objective:   Physical Exam  Constitutional:       General: She is not in acute distress. Appearance: Normal appearance. She is well-developed and normal weight. She is not diaphoretic. HENT:      Head: Normocephalic and atraumatic. Nose: Nose normal.      Mouth/Throat:      Mouth: Mucous membranes are moist.      Pharynx: Oropharynx is clear. Eyes:      Pupils: Pupils are equal, round, and reactive to light. Neck:      Thyroid: No thyromegaly. Cardiovascular:      Rate and Rhythm: Normal rate and regular rhythm. Heart sounds: Normal heart sounds. No murmur heard. No friction rub. No gallop. Pulmonary:      Effort: Pulmonary effort is normal. No respiratory distress. Breath sounds: Normal breath sounds. No wheezing or rales. Chest:   Breasts:      Right: Normal. No swelling, bleeding, inverted nipple, mass, nipple discharge, skin change or tenderness. Left: Normal. No swelling, bleeding, inverted nipple, mass, nipple discharge, skin change or tenderness. Abdominal:      General: Abdomen is flat. Bowel sounds are normal. There is no distension. Palpations: Abdomen is soft. There is no hepatomegaly or mass. Tenderness: There is no abdominal tenderness. There is no guarding or rebound. Hernia: No hernia is present. There is no hernia in the left inguinal area. Genitourinary:     General: Normal vulva. Exam position: Lithotomy position. Pubic Area: No rash. Labia:         Right: No rash, tenderness, lesion or injury. Left: No rash, tenderness, lesion or injury. Urethra: No prolapse, urethral pain, urethral swelling or urethral lesion. Vagina: Normal. No signs of injury and foreign body. No vaginal discharge, erythema, tenderness or bleeding. Cervix: No cervical motion tenderness, discharge, friability, lesion, erythema, cervical bleeding or eversion. Uterus: Not deviated, not enlarged, not fixed, not tender and no uterine prolapse. Adnexa:         Right: No mass, tenderness or fullness. Left: No mass, tenderness or fullness. Rectum: Normal. Guaiac result negative. No mass, tenderness, anal fissure, external hemorrhoid or internal hemorrhoid. Normal anal tone. Comments: Normal urethral meatus, nl urethra, nl bladder. Musculoskeletal:         General: No tenderness. Normal range of motion. Cervical back: Normal range of motion and neck supple. No rigidity. Lymphadenopathy:      Cervical: No cervical adenopathy. Lower Body: No right inguinal adenopathy. No left inguinal adenopathy. Skin:     General: Skin is warm and dry. Findings: No erythema or rash. Neurological:      General: No focal deficit present. Mental Status: She is alert and oriented to person, place, and time. Deep Tendon Reflexes: Reflexes are normal and symmetric.    Psychiatric:         Mood and Affect: Mood normal.         Behavior: Behavior normal.         Thought Content: Thought content normal.         Judgment: Judgment normal.         Assessment:      1. Annual  2. menopause      Plan:      1. Pap, calcium, exercise, mammogram, hemocult negative  2.  DEXA, vagifem    Last pelvic US stable        Luz Dwyer MD

## 2022-06-01 ENCOUNTER — OFFICE VISIT (OUTPATIENT)
Dept: DERMATOLOGY | Age: 67
End: 2022-06-01
Payer: COMMERCIAL

## 2022-06-01 DIAGNOSIS — D22.9 MULTIPLE NEVI: ICD-10-CM

## 2022-06-01 DIAGNOSIS — L82.1 SK (SEBORRHEIC KERATOSIS): Primary | ICD-10-CM

## 2022-06-01 PROCEDURE — 99213 OFFICE O/P EST LOW 20 MIN: CPT | Performed by: DERMATOLOGY

## 2022-06-01 NOTE — PROGRESS NOTES
[facility]  Denise Mcneil MD  [phone]      Grace GarlandPam  1955    61 y.o. female     Date of Visit: 2022    Chief Complaint: skin lesion    History of Present Illness:    1. She complains of a persistent asymptomatic brown lesion on the right shoulder. 2.  She has multiple nevi on the trunk and extremities - not aware of any changes in size, color or shape. Review of Systems:  Gen: Feels well, good sense of health. Past Medical History, Family History, Surgical History, Medications and Allergies reviewed.     Past Medical History:   Diagnosis Date    Acne rosacea 2011    Anxiety     Colon polyps     Diabetes mellitus ([address]     pre-diabetic (diet controlled)    Diabetic eye exam ([address] 4.9.15    [facility]    Diabetic eye exam ([address] 16    [facility]     Diverticulosis 2013    Dry eyes     Gastroesophageal reflux disease 5/15/2018    Hyperlipidemia     Insufficiency of tear film of both eyes 2016    Mild episode of recurrent major depressive disorder ([address] 2019    Nuclear sclerosis of both eyes 2016    Pneumothorax     Pregnancy     grava - 2, para - 2, uterine rupture with stillbirth    Uterus didelphus      Past Surgical History:   Procedure Laterality Date     SECTION      2    COLONOSCOPY      COLONOSCOPY N/A 2020    COLONOSCOPY POLYPECTOMY SNARE/COLD BIOPSY performed by David Lang MD at [facility] 149      septum removed    WISDOM TOOTH EXTRACTION      x 4       Allergies   Allergen Reactions    Bactrim [Sulfamethoxazole-Trimethoprim] Rash    Doxycycline Nausea And Vomiting     Outpatient Medications Marked as Taking for the 22 encounter (Office Visit) with Monie Redd MD   Medication Sig Dispense Refill    Estradiol (VAGIFEM) 10 MCG TABS vaginal tablet Place 1 tablet vaginally Twice a Week 24 tablet 3    pravastatin (PRAVACHOL) 20 MG tablet Take 1 tablet by mouth daily 90 tablet 1    ondansetron (ZOFRAN) 4 MG tablet Take 1 tablet by mouth every 8 hours as needed for Nausea or Vomiting 15 tablet 1    Diclofenac Sodium POWD 2 g by Does not apply route 4 times daily Formula 5D Diclo 3% Prilo 2% Lido 2% (Patient taking differently: 2 g by Does not apply route 4 times daily Diclo 3% Lido 4%) 240 g 11    eszopiclone (LUNESTA) 2 MG TABS Take 1 tablet by mouth nightly. 30 tablet 0    propranolol (INDERAL) 20 MG tablet Take 1 tablet by mouth 2 times daily as needed (performance anxiety) 30 tablet 0    omega-3 acid ethyl esters (LOVAZA) 1 g capsule Take 4 capsules by mouth daily 360 capsule 3    cycloSPORINE (RESTASIS OP) Apply 1 drop to eye 2 times daily Each eye      Calcium Carbonate Antacid (TUMS ULTRA 1000 PO) Take by mouth as needed      Biotin 2500 MCG CAPS Take by mouth daily      Multiple Vitamins-Minerals (THERAPEUTIC MULTIVITAMIN-MINERALS) tablet Take 1 tablet by mouth daily      MINOXIDIL, TOPICAL, 5 % SOLN Apply topically daily       omeprazole (PRILOSEC) 20 MG delayed release capsule Take 20 mg by mouth daily      docusate sodium (COLACE) 100 MG capsule Take 100 mg by mouth 2 times daily      Cholecalciferol (VITAMIN D) 2000 UNITS CAPS capsule Take by mouth      naproxen (NAPROSYN) 250 MG tablet Take 500 mg by mouth 2 times daily (with meals)       Melatonin 3 MG TABS Take 3 mg by mouth daily. [address] ([employer]) MISC by Does not apply route. As directed            Physical Examination       The following were examined and determined to be normal: Psych/Neuro, Scalp/hair, Head/face, Conjunctivae/eyelids, Gums/teeth/lips, Neck, Breast/axilla/chest, Abdomen, Back, RUE, LUE, RLE, LLE and Nails/digits. The following were examined and determined to be abnormal: None. Well appearing. 1.  Right shoulder - stuck on appearing waxy brown papule.   Trunk with stuck-on appearing tan-brown verrucous papules and plaques. 2.  Trunk and extremities with multiple well defined round to oval smooth brown macules and papules. Assessment and Plan     1. SK (seborrheic keratosis) - multiple    Reassurance. 2. Multiple nevi - benign appearing    Sun protective behaviors, including use of at least SPF 30 sunscreen, and self skin examinations were encouraged. Call for any new or concerning lesions. Return in about 1 year (around 6/1/2023).     --Perla Basurto MD

## 2022-06-08 LAB
CATARACTS: POSITIVE
DIABETIC RETINOPATHY: NEGATIVE
GLAUCOMA: NEGATIVE
INTRAOCULAR PRESSURE EYE: NORMAL
VISUAL ACUITY DISTANCE LEFT EYE: NORMAL
VISUAL ACUITY DISTANCE RIGHT EYE: NORMAL

## 2022-09-12 LAB
CHOLESTEROL: 253 MG/DL
GLUCOSE BLD-MCNC: 96 MG/DL (ref 70–99)
HDLC SERPL-MCNC: 110 MG/DL
LDL CHOLESTEROL CALCULATED: 132 MG/DL
PATIENT FASTING?: YES
TRIGL SERPL-MCNC: 57 MG/DL

## 2022-09-28 DIAGNOSIS — G47.00 INSOMNIA, UNSPECIFIED TYPE: ICD-10-CM

## 2022-09-28 RX ORDER — ESZOPICLONE 2 MG/1
2 TABLET, FILM COATED ORAL NIGHTLY
Qty: 30 TABLET | Refills: 0 | Status: SHIPPED | OUTPATIENT
Start: 2022-09-28 | End: 2023-09-28

## 2022-09-28 NOTE — TELEPHONE ENCOUNTER
Medication:   Requested Prescriptions     Pending Prescriptions Disp Refills    eszopiclone (LUNESTA) 2 MG TABS 30 tablet 0     Sig: Take 1 tablet by mouth nightly. Last Filled:  10/27/2021    Patient Phone Number: [phone] (home)     Last appt: 4/27/2022   Next appt: Visit date not found    Last OARRS: No flowsheet data found.

## 2022-12-19 DIAGNOSIS — E78.2 MIXED HYPERLIPIDEMIA: ICD-10-CM

## 2022-12-19 NOTE — TELEPHONE ENCOUNTER
Medication:   Requested Prescriptions     Pending Prescriptions Disp Refills    pravastatin (PRAVACHOL) 20 MG tablet [Pharmacy Med Name: PRAVASTATIN SODIUM 20MG TABS] 90 tablet 0     Sig: TAKE 1 TABLET BY MOUTH ONE TIME A DAY        Last Filled:  4/27/22    Patient Phone Number: [phone] (home)     Last appt: 4/27/2022   Next appt: Visit date not found    Last OARRS: No flowsheet data found.

## 2022-12-20 RX ORDER — PRAVASTATIN SODIUM 20 MG
TABLET ORAL
Qty: 90 TABLET | Refills: 0 | Status: SHIPPED | OUTPATIENT
Start: 2022-12-20

## 2023-01-11 DIAGNOSIS — N95.2 VAGINAL ATROPHY: ICD-10-CM

## 2023-01-11 DIAGNOSIS — E78.2 MIXED HYPERLIPIDEMIA: ICD-10-CM

## 2023-01-11 RX ORDER — ESTRADIOL 10 UG/1
10 INSERT VAGINAL
Qty: 24 TABLET | Refills: 3 | Status: SHIPPED | OUTPATIENT
Start: 2023-01-12 | End: 2023-02-11

## 2023-01-11 RX ORDER — OMEGA-3-ACID ETHYL ESTERS 1 G/1
4 CAPSULE, LIQUID FILLED ORAL DAILY
Qty: 360 CAPSULE | Refills: 3 | Status: SHIPPED | OUTPATIENT
Start: 2023-01-11

## 2023-01-11 NOTE — TELEPHONE ENCOUNTER
Medication:   Requested Prescriptions     Pending Prescriptions Disp Refills    omega-3 acid ethyl esters (LOVAZA) 1 g capsule 360 capsule 3     Sig: Take 4 capsules by mouth daily        Last Filled:  10/27/21    Patient Phone Number: [phone] (home)     Last appt: 4/27/2022   Next appt: Visit date not found    Last OARRS: No flowsheet data found.

## 2023-02-16 ENCOUNTER — HOSPITAL ENCOUNTER (OUTPATIENT)
Dept: WOMENS IMAGING | Age: 68
Discharge: HOME | End: 2023-02-16
Payer: COMMERCIAL

## 2023-02-16 VITALS — BODY MASS INDEX: 19.57 KG/M2 | WEIGHT: 124.7 LBS | HEIGHT: 67 IN

## 2023-02-16 DIAGNOSIS — Z12.31 VISIT FOR SCREENING MAMMOGRAM: ICD-10-CM

## 2023-02-16 PROCEDURE — 77067 SCR MAMMO BI INCL CAD: CPT

## 2023-02-20 NOTE — TELEPHONE ENCOUNTER
Medication:   Requested Prescriptions     Pending Prescriptions Disp Refills    Diclofenac Sodium POWD       Si g by Does not apply route 4 times daily Diclo 3% Lido 4%        Last Filled:  22    Patient Phone Number: [phone] (home)     Last appt: 2022   Next appt: Visit date not found    Last OARRS: No flowsheet data found.         1) Functional oral stage of swallow for regular solids, puree, and thin fluids marked by adequate acceptance, bolus manipulation, mastication, and coordination. Adequate bolus collection and timely anterior to posterior oral transit/transfer noted. Adequate oral clearance observed. 2) Functional pharyngeal stage of swallow for regular solids, puree, and thin fluids marked by initiation of pharyngeal swallow trigger and hyolaryngeal excursion noted upon digital palpation. No overt signs/symptoms of penetration/aspiration noted.

## 2023-02-24 ENCOUNTER — TELEPHONE (OUTPATIENT)
Dept: FAMILY MEDICINE CLINIC | Age: 68
End: 2023-02-24

## 2023-02-24 NOTE — TELEPHONE ENCOUNTER
Refill needed for Diclofenac Powder. [facility] to [facility]. Advised pt doctor has 24-48 business hours to complete.

## 2023-02-27 ENCOUNTER — TELEPHONE (OUTPATIENT)
Dept: DERMATOLOGY | Age: 68
End: 2023-02-27

## 2023-02-28 DIAGNOSIS — L71.9 ACNE ROSACEA: ICD-10-CM

## 2023-02-28 NOTE — TELEPHONE ENCOUNTER
Medication:   Requested Prescriptions     Pending Prescriptions Disp Refills    Diclofenac Sodium POWD 240 g 11     Si g by Does not apply route 4 times daily Formula 5D Diclo 3% Prilo 2% Lido 2%        Last Filled:  23 - \"Message from Pharmacy\", (Patients insurance doesn't cover. Please increase Lidocaine 4% and remove Prilocaine, this is more cost effective for the patient). Patient Phone Number: [phone] (home)     Last appt: 2022   Next appt: Visit date not found    Last OARRS: No flowsheet data found.

## 2023-03-01 NOTE — TELEPHONE ENCOUNTER
Patient LOV 2020. Scheduled for cosmetic consult regarding her \"11\" lines on her forehead. Patient did not want to keep coming back for treatment and questioning which treatment would be best for less follow up. I advised patient that she would have to re-evaluate concerns with physician.

## 2023-03-01 NOTE — TELEPHONE ENCOUNTER
Bedside shift change report given to Ady Edmond (oncoming nurse) by Brook Aguilar (offgoing nurse). Report included the following information SBAR, Kardex and MAR. LVM to return call to schedule.

## 2023-03-31 DIAGNOSIS — E78.2 MIXED HYPERLIPIDEMIA: ICD-10-CM

## 2023-03-31 RX ORDER — PRAVASTATIN SODIUM 20 MG
TABLET ORAL
Qty: 90 TABLET | Refills: 0 | Status: SHIPPED | OUTPATIENT
Start: 2023-03-31

## 2023-03-31 NOTE — TELEPHONE ENCOUNTER
Medication:   Requested Prescriptions     Pending Prescriptions Disp Refills    pravastatin (PRAVACHOL) 20 MG tablet [Pharmacy Med Name: PRAVASTATIN SODIUM 20MG TABS] 90 tablet 0     Sig: TAKE ONE TABLET BY MOUTH ONE TIME A DAY        Last Filled:  12/20/22    Patient Phone Number: [phone] (home)     Last appt: 4/27/2022   Next appt: Visit date not found    Last OARRS: No flowsheet data found.

## 2023-05-05 ENCOUNTER — TELEPHONE (OUTPATIENT)
Dept: GYNECOLOGY | Age: 68
End: 2023-05-05

## 2023-05-05 DIAGNOSIS — Z78.0 MENOPAUSE: Primary | ICD-10-CM

## 2023-05-05 NOTE — TELEPHONE ENCOUNTER
Dexa scan order order says expected date was 10/2022. Patient would like a new order for Dex Scan she would like to have this done before her upcoming appointment.

## 2023-05-05 NOTE — TELEPHONE ENCOUNTER
Patient has an active order for her Dex Scan that is good until May 10, 2023. Called and left a message for a patient to call us back so I can inform her of order in system.  And she can call [phone]

## 2023-05-05 NOTE — TELEPHONE ENCOUNTER
Patient was wondering if Dr. Ruthie Ernandez could renew her Dexa test before her scheduled appointment.                 Patient can be reached at [phone]

## 2023-05-11 ENCOUNTER — PROCEDURE VISIT (OUTPATIENT)
Dept: DERMATOLOGY | Age: 68
End: 2023-05-11

## 2023-05-11 DIAGNOSIS — L98.8 WRINKLES: Primary | ICD-10-CM

## 2023-05-11 RX ORDER — MULTIVIT WITH MINERALS/LUTEIN
250 TABLET ORAL DAILY
COMMUNITY

## 2023-05-11 RX ORDER — BIMATOPROST 3 UG/ML
SOLUTION TOPICAL
Qty: 5 ML | Refills: 3 | Status: SHIPPED | OUTPATIENT
Start: 2023-05-11

## 2023-05-11 NOTE — PROGRESS NOTES
[facility]  Tesha Fraser MD  [phone]      Alfa GarlandPam  1955    74 y.o. female     Date of Visit: 5/11/2023     *Dr. 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 History of Present Illness:    Here for eval and trx of glabellar wrinkles. S/p 1 trx of glabellar wrinkles with botox in . Glabella was immobile and though improved, still had remaining fixed furrow. Didn't rtn for any other trx after initial trx. Back to discuss options and consider rpt botox. No NM probs, not pregnant or BF. No bleeding probs,. Review of Systems:  Gen: Feels well, good sense of health. No F/C. Past Medical History, Family History, Surgical History, Medications and Allergies reviewed. Outpatient Medications Marked as Taking for the 5/11/23 encounter (Procedure visit) with Shirley Francois MD   Medication Sig Dispense Refill    Ascorbic Acid (VITAMIN C) 250 MG tablet Take 1 tablet by mouth daily      pravastatin (PRAVACHOL) 20 MG tablet TAKE ONE TABLET BY MOUTH ONE TIME A DAY 90 tablet 0    Diclofenac Sodium POWD 2 g by Does not apply route 4 times daily Formula 5D Diclo 3%  Lido 4% 240 g 11    Estradiol (VAGIFEM) 10 MCG TABS vaginal tablet Place 1 tablet vaginally Twice a Week 24 tablet 3    omega-3 acid ethyl esters (LOVAZA) 1 g capsule Take 4 capsules by mouth daily 360 capsule 3    eszopiclone (LUNESTA) 2 MG TABS Take 1 tablet by mouth nightly.  30 tablet 0    ondansetron (ZOFRAN) 4 MG tablet Take 1 tablet by mouth every 8 hours as needed for Nausea or Vomiting 15 tablet 1    propranolol (INDERAL) 20 MG tablet Take 1 tablet by mouth 2 times daily as needed (performance anxiety) 30 tablet 0    Calcium Carbonate Antacid (TUMS ULTRA 1000 PO) Take by mouth as needed      Multiple Vitamins-Minerals (THERAPEUTIC MULTIVITAMIN-MINERALS) tablet

## 2023-05-11 NOTE — PATIENT INSTRUCTIONS
Post-Injection Care:  Do not lie flat or turn upside down x 4 hrs after treatment. Do not massage treated areas for 24-48 hours (avoid clarisonic-type devices). It is OK to apply makeup and wash your face gently. It can take 2-4 days for onset of effects. Full effects can take up to 10-14 days. Call in the next 1-2 weeks if there are any concerns or questions or if you think additional treatment is needed. Bruising can occur but should not interfere with results. Avoid aspirin, ibuprofen before future treatments.      20 units - 240

## 2023-05-16 ENCOUNTER — HOSPITAL ENCOUNTER (OUTPATIENT)
Dept: GENERAL RADIOLOGY | Age: 68
Discharge: HOME | End: 2023-05-16
Payer: COMMERCIAL

## 2023-05-16 ENCOUNTER — OFFICE VISIT (OUTPATIENT)
Dept: GYNECOLOGY | Age: 68
End: 2023-05-16
Payer: COMMERCIAL

## 2023-05-16 VITALS
OXYGEN SATURATION: 97 % | BODY MASS INDEX: 20.34 KG/M2 | HEART RATE: 81 BPM | WEIGHT: 129.6 LBS | HEIGHT: 67 IN | DIASTOLIC BLOOD PRESSURE: 66 MMHG | SYSTOLIC BLOOD PRESSURE: 188 MMHG

## 2023-05-16 DIAGNOSIS — Z78.0 MENOPAUSE: ICD-10-CM

## 2023-05-16 DIAGNOSIS — M81.8 OTHER OSTEOPOROSIS WITHOUT CURRENT PATHOLOGICAL FRACTURE: ICD-10-CM

## 2023-05-16 DIAGNOSIS — Z01.419 WELL WOMAN EXAM WITH ROUTINE GYNECOLOGICAL EXAM: Primary | ICD-10-CM

## 2023-05-16 PROCEDURE — 77080 DXA BONE DENSITY AXIAL: CPT

## 2023-05-16 PROCEDURE — 99397 PER PM REEVAL EST PAT 65+ YR: CPT | Performed by: OBSTETRICS & GYNECOLOGY

## 2023-05-16 RX ORDER — ALENDRONATE SODIUM 70 MG/1
70 TABLET ORAL
Qty: 12 TABLET | Refills: 3 | Status: SHIPPED | OUTPATIENT
Start: 2023-05-16

## 2023-05-16 ASSESSMENT — ENCOUNTER SYMPTOMS
EYES NEGATIVE: 1
GASTROINTESTINAL NEGATIVE: 1
RESPIRATORY NEGATIVE: 1
ALLERGIC/IMMUNOLOGIC NEGATIVE: 1

## 2023-05-17 NOTE — PROGRESS NOTES
for Nausea or Vomiting 15 tablet 1    propranolol (INDERAL) 20 MG tablet Take 1 tablet by mouth 2 times daily as needed (performance anxiety) 30 tablet 0    Calcium Carbonate Antacid (TUMS ULTRA 1000 PO) Take by mouth as needed      Multiple Vitamins-Minerals (THERAPEUTIC MULTIVITAMIN-MINERALS) tablet Take 1 tablet by mouth daily      MINOXIDIL, TOPICAL, 5 % SOLN Apply topically daily       omeprazole (PRILOSEC) 20 MG delayed release capsule Take 1 capsule by mouth daily      docusate sodium (COLACE) 100 MG capsule Take 1 capsule by mouth 2 times daily      Cholecalciferol (VITAMIN D) 2000 UNITS CAPS capsule Take by mouth      naproxen (NAPROSYN) 250 MG tablet Take 2 tablets by mouth 2 times daily (with meals)      Melatonin 3 MG TABS Take 1 tablet by mouth daily      Foot Care Products (TRI-BALANCE ORTHOTICS WOMENS) MISC by Does not apply route.  As directed        Family History   Problem Relation Age of Onset    Stroke Mother     Glaucoma Mother     Cataracts Mother     High Cholesterol Mother     High Blood Pressure Mother     Heart Disease Mother     High Blood Pressure Father     Heart Disease Paternal Uncle     Cancer Paternal Uncle     Cancer Other     Stroke Maternal Uncle     Heart Disease Maternal Uncle     Mental Illness Maternal Grandmother     Breast Cancer Maternal Grandmother     No Known Problems Sister     No Known Problems Brother     Breast Cancer Maternal Aunt     No Known Problems Paternal Aunt     No Known Problems Maternal Grandfather     No Known Problems Paternal Grandmother     No Known Problems Paternal Grandfather     Rheum Arthritis Neg Hx     Osteoarthritis Neg Hx     Asthma Neg Hx     Diabetes Neg Hx     Heart Failure Neg Hx     Hypertension Neg Hx     Migraines Neg Hx     Ovarian Cancer Neg Hx     Rashes/Skin Problems Neg Hx     Seizures Neg Hx     Thyroid Disease Neg Hx      BP (!) 188/66 (Site: Right Upper Arm, Position: Sitting, Cuff Size: Medium Adult)   Pulse 81   Ht 5' 7\"

## 2023-06-12 DIAGNOSIS — G47.00 INSOMNIA, UNSPECIFIED TYPE: ICD-10-CM

## 2023-06-12 RX ORDER — ESZOPICLONE 2 MG/1
TABLET, FILM COATED ORAL
Qty: 30 TABLET | Refills: 0 | OUTPATIENT
Start: 2023-06-12

## 2023-06-12 NOTE — TELEPHONE ENCOUNTER
Patient has not been seen in over six months, legally needs an appointment prior to refill. Patient scheduled.

## 2023-06-27 LAB
CATARACTS: POSITIVE
DIABETIC RETINOPATHY: NEGATIVE
GLAUCOMA: NEGATIVE
INTRAOCULAR PRESSURE EYE: 10
INTRAOCULAR PRESSURE EYE: 15
VISUAL ACUITY DISTANCE LEFT EYE: NORMAL
VISUAL ACUITY DISTANCE RIGHT EYE: NORMAL

## 2023-06-28 ENCOUNTER — OFFICE VISIT (OUTPATIENT)
Dept: FAMILY MEDICINE CLINIC | Age: 68
End: 2023-06-28
Payer: COMMERCIAL

## 2023-06-28 VITALS
BODY MASS INDEX: 20.15 KG/M2 | HEIGHT: 67 IN | HEART RATE: 77 BPM | WEIGHT: 128.4 LBS | OXYGEN SATURATION: 97 % | DIASTOLIC BLOOD PRESSURE: 82 MMHG | SYSTOLIC BLOOD PRESSURE: 110 MMHG

## 2023-06-28 DIAGNOSIS — Z91.81 AT HIGH RISK FOR FALLS: ICD-10-CM

## 2023-06-28 DIAGNOSIS — G47.00 INSOMNIA, UNSPECIFIED TYPE: ICD-10-CM

## 2023-06-28 DIAGNOSIS — R73.03 PRE-DIABETES: ICD-10-CM

## 2023-06-28 DIAGNOSIS — Z00.00 WELL ADULT EXAM: Primary | ICD-10-CM

## 2023-06-28 DIAGNOSIS — E78.2 MIXED HYPERLIPIDEMIA: ICD-10-CM

## 2023-06-28 DIAGNOSIS — I95.1 ORTHOSTATIC HYPOTENSION: ICD-10-CM

## 2023-06-28 DIAGNOSIS — R07.9 CHEST PAIN, UNSPECIFIED TYPE: ICD-10-CM

## 2023-06-28 DIAGNOSIS — Z00.00 WELL ADULT EXAM: ICD-10-CM

## 2023-06-28 PROBLEM — F33.0 MILD EPISODE OF RECURRENT MAJOR DEPRESSIVE DISORDER (HCC): Status: RESOLVED | Noted: 2019-02-06 | Resolved: 2023-06-28

## 2023-06-28 LAB
ALBUMIN SERPL-MCNC: 3.6 G/DL (ref 3.4–5)
ALBUMIN/GLOB SERPL: 1.6 {RATIO} (ref 1.1–2.2)
ALP SERPL-CCNC: 51 U/L (ref 40–129)
ALT SERPL-CCNC: 28 U/L (ref 10–40)
ANION GAP SERPL CALCULATED.3IONS-SCNC: 6 MMOL/L (ref 3–16)
AST SERPL-CCNC: 21 U/L (ref 15–37)
BILIRUB SERPL-MCNC: 0.3 MG/DL (ref 0–1)
BUN SERPL-MCNC: 25 MG/DL (ref 7–20)
CALCIUM SERPL-MCNC: 8.4 MG/DL (ref 8.3–10.6)
CHLORIDE SERPL-SCNC: 104 MMOL/L (ref 99–110)
CHOLEST SERPL-MCNC: 197 MG/DL (ref 0–199)
CO2 SERPL-SCNC: 30 MMOL/L (ref 21–32)
CREAT SERPL-MCNC: <0.5 MG/DL (ref 0.6–1.2)
DEPRECATED RDW RBC AUTO: 13.5 % (ref 12.4–15.4)
GFR SERPLBLD CREATININE-BSD FMLA CKD-EPI: >60 ML/MIN/{1.73_M2}
GLUCOSE SERPL-MCNC: 99 MG/DL (ref 70–99)
HCT VFR BLD AUTO: 42 % (ref 36–48)
HDLC SERPL-MCNC: 81 MG/DL (ref 40–60)
HGB BLD-MCNC: 14.2 G/DL (ref 12–16)
LDLC SERPL CALC-MCNC: 107 MG/DL
MCH RBC QN AUTO: 32.4 PG (ref 26–34)
MCHC RBC AUTO-ENTMCNC: 33.8 G/DL (ref 31–36)
MCV RBC AUTO: 96 FL (ref 80–100)
PLATELET # BLD AUTO: 243 K/UL (ref 135–450)
PMV BLD AUTO: 8.5 FL (ref 5–10.5)
POTASSIUM SERPL-SCNC: 4.1 MMOL/L (ref 3.5–5.1)
PROT SERPL-MCNC: 5.8 G/DL (ref 6.4–8.2)
RBC # BLD AUTO: 4.37 M/UL (ref 4–5.2)
SODIUM SERPL-SCNC: 140 MMOL/L (ref 136–145)
TRIGL SERPL-MCNC: 47 MG/DL (ref 0–150)
TSH SERPL DL<=0.005 MIU/L-ACNC: 1.04 UIU/ML (ref 0.27–4.2)
VLDLC SERPL CALC-MCNC: 9 MG/DL
WBC # BLD AUTO: 5.5 K/UL (ref 4–11)

## 2023-06-28 PROCEDURE — 99397 PER PM REEVAL EST PAT 65+ YR: CPT | Performed by: FAMILY MEDICINE

## 2023-06-28 PROCEDURE — 93000 ELECTROCARDIOGRAM COMPLETE: CPT | Performed by: FAMILY MEDICINE

## 2023-06-28 RX ORDER — ESZOPICLONE 2 MG/1
2 TABLET, FILM COATED ORAL NIGHTLY
Qty: 30 TABLET | Refills: 2 | Status: SHIPPED | OUTPATIENT
Start: 2023-06-28 | End: 2023-09-26

## 2023-06-28 RX ORDER — PRAVASTATIN SODIUM 20 MG
20 TABLET ORAL DAILY
Qty: 90 TABLET | Refills: 3 | Status: SHIPPED | OUTPATIENT
Start: 2023-06-28

## 2023-06-28 RX ORDER — MIDODRINE HYDROCHLORIDE 2.5 MG/1
2.5 TABLET ORAL DAILY PRN
Qty: 30 TABLET | Refills: 2 | Status: SHIPPED | OUTPATIENT
Start: 2023-06-28

## 2023-06-28 SDOH — ECONOMIC STABILITY: FOOD INSECURITY: WITHIN THE PAST 12 MONTHS, THE FOOD YOU BOUGHT JUST DIDN'T LAST AND YOU DIDN'T HAVE MONEY TO GET MORE.: NEVER TRUE

## 2023-06-28 SDOH — ECONOMIC STABILITY: FOOD INSECURITY: WITHIN THE PAST 12 MONTHS, YOU WORRIED THAT YOUR FOOD WOULD RUN OUT BEFORE YOU GOT MONEY TO BUY MORE.: NEVER TRUE

## 2023-06-28 SDOH — ECONOMIC STABILITY: HOUSING INSECURITY
IN THE LAST 12 MONTHS, WAS THERE A TIME WHEN YOU DID NOT HAVE A STEADY PLACE TO SLEEP OR SLEPT IN A SHELTER (INCLUDING NOW)?: NO

## 2023-06-28 SDOH — ECONOMIC STABILITY: INCOME INSECURITY: HOW HARD IS IT FOR YOU TO PAY FOR THE VERY BASICS LIKE FOOD, HOUSING, MEDICAL CARE, AND HEATING?: NOT HARD AT ALL

## 2023-06-28 ASSESSMENT — PATIENT HEALTH QUESTIONNAIRE - PHQ9
2. FEELING DOWN, DEPRESSED OR HOPELESS: 0
SUM OF ALL RESPONSES TO PHQ9 QUESTIONS 1 & 2: 0
SUM OF ALL RESPONSES TO PHQ QUESTIONS 1-9: 0
1. LITTLE INTEREST OR PLEASURE IN DOING THINGS: 0
SUM OF ALL RESPONSES TO PHQ QUESTIONS 1-9: 0

## 2023-06-29 LAB
EST. AVERAGE GLUCOSE BLD GHB EST-MCNC: 125.5 MG/DL
HBA1C MFR BLD: 6 %

## 2023-07-05 ENCOUNTER — HOSPITAL ENCOUNTER (OUTPATIENT)
Dept: NON INVASIVE DIAGNOSTICS | Age: 68
Discharge: HOME | End: 2023-07-05
Payer: COMMERCIAL

## 2023-07-05 DIAGNOSIS — R07.9 CHEST PAIN, UNSPECIFIED TYPE: ICD-10-CM

## 2023-07-05 PROCEDURE — 93017 CV STRESS TEST TRACING ONLY: CPT

## 2023-07-26 ENCOUNTER — TELEPHONE (OUTPATIENT)
Dept: FAMILY MEDICINE CLINIC | Age: 68
End: 2023-07-26

## 2023-07-26 NOTE — TELEPHONE ENCOUNTER
Patient came into the office today to drop off Be Well within form. Her waist measurement was done at the office. Blank form has been scanned into the patient's chart and placed in 's inbox.

## 2023-09-05 ENCOUNTER — PROCEDURE VISIT (OUTPATIENT)
Dept: DERMATOLOGY | Age: 68
End: 2023-09-05

## 2023-09-05 DIAGNOSIS — L98.8 WRINKLES: Primary | ICD-10-CM

## 2023-09-05 PROCEDURE — DM00140 PR DERM ONLY BOTOX INJ LEVEL 5: Performed by: DERMATOLOGY

## 2023-09-05 NOTE — PROGRESS NOTES
[facility]  Gordon Rojas MD  [phone]      [facility]  1955    74 y.o. female     Date of Visit: 9/5/2023     *Dr. 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 History of Present Illness:    Here for eval and trx of glabellar wrinkles. S/p few trx of glabellar wrinkles with botox in  and last 5-2023. Glabella was immobile and though improved, still had remaining fixed furrow. Didn't rtn for any other trx after initial trx until 5-2023. No probs with last trx. Happy with results. Would like trx of tails as well for brow lift. No [place] probs, not pregnant or BF. No bleeding probs,. Review of Systems:  Gen: Feels well, good sense of health. No F/C. Past Medical History, Family History, Surgical History, Medications and Allergies reviewed. Outpatient Medications Marked as Taking for the 9/5/23 encounter (Procedure visit) with Yun Hidalgo MD   Medication Sig Dispense Refill    midodrine (PROAMATINE) 2.5 MG tablet Take 1 tablet by mouth daily as needed (hypotension) 30 tablet 2    pravastatin (PRAVACHOL) 20 MG tablet Take 1 tablet by mouth daily 90 tablet 3    eszopiclone (LUNESTA) 2 MG TABS Take 1 tablet by mouth nightly for 90 days. Max Daily Amount: 2 mg 30 tablet 2    alendronate (FOSAMAX) 70 MG tablet Take 1 tablet by mouth every 7 days Take once per week in the morning with a full glass of water, on an empty stomach, and do not take anything else by mouth or lie down for the next 30 minutes. 12 tablet 3    Ascorbic Acid (VITAMIN C) 250 MG tablet Take 1 tablet by mouth daily      bimatoprost (LATISSE) 0.03 % SOLN Apply nightly as directed to upper lids and brows as directed.  5 mL 3    Diclofenac Sodium POWD 2 g by Does not apply route 4 times daily Formula 5D Diclo 3%  Lido 4% 240 g 11    Estradiol (VAGIFEM)

## 2023-09-05 NOTE — PATIENT INSTRUCTIONS
Post-Injection Care:  Do not lie flat or turn upside down x 4 hrs after treatment. Do not massage treated areas for 24-48 hours (avoid clarisonic-type devices). It is OK to apply makeup and wash your face gently. It can take 2-4 days for onset of effects. Full effects can take up to 10-14 days. Call in the next 1-2 weeks if there are any concerns or questions or if you think additional treatment is needed. Bruising can occur but should not interfere with results. Avoid aspirin, ibuprofen before future treatments.        [address]

## 2023-10-17 NOTE — PROGRESS NOTES
Negative for difficulty urinating, dysuria, enuresis, flank pain, frequency, hematuria and urgency. Musculoskeletal:  Negative for back pain, gait problem, joint swelling, neck pain and neck stiffness. Leg fatigue   Skin:  Negative for color change, pallor, rash and wound. Allergic/Immunologic: Negative for environmental allergies and food allergies. Neurological:  Negative for dizziness, tremors, syncope, speech difficulty, weakness, light-headedness, numbness and headaches. Hematological:  Negative for adenopathy. Does not bruise/bleed easily. Psychiatric/Behavioral:  Negative for agitation, behavioral problems, confusion, decreased concentration, dysphoric mood, hallucinations, self-injury, sleep disturbance and suicidal ideas. The patient is not nervous/anxious and is not hyperactive. Prior to Visit Medications    Medication Sig Taking? Authorizing Provider   nitroglycerin (NITRO-BID) 2 % ointment Apply to effected areas twice a day as needed, avoid touching mouth, face and eyes after applying ointment, if pain continues will need to see rheumatology. Yes ASHLEY Jeffery - CNP   midodrine (PROAMATINE) 2.5 MG tablet Take 1 tablet by mouth daily as needed (hypotension) Yes Susan Narayanan MD   pravastatin (PRAVACHOL) 20 MG tablet Take 1 tablet by mouth daily Yes Suasn Narayanan MD   alendronate (FOSAMAX) 70 MG tablet Take 1 tablet by mouth every 7 days Take once per week in the morning with a full glass of water, on an empty stomach, and do not take anything else by mouth or lie down for the next 30 minutes. Yes Kamryn Parada MD   Ascorbic Acid (VITAMIN C) 250 MG tablet Take 1 tablet by mouth daily Yes Stephanie Vance MD   bimatoprost (LATISSE) 0.03 % SOLN Apply nightly as directed to upper lids and brows as directed.  Yes Kulwinder Hinkle MD   Diclofenac Sodium POWD 2 g by Does not apply route 4 times daily Formula 5D Diclo 3%  Lido 4% Yes Susan Narayanan MD

## 2023-10-18 ENCOUNTER — TELEPHONE (OUTPATIENT)
Dept: DERMATOLOGY | Age: 68
End: 2023-10-18

## 2023-10-18 ENCOUNTER — OFFICE VISIT (OUTPATIENT)
Dept: FAMILY MEDICINE CLINIC | Age: 68
End: 2023-10-18
Payer: COMMERCIAL

## 2023-10-18 VITALS
SYSTOLIC BLOOD PRESSURE: 122 MMHG | DIASTOLIC BLOOD PRESSURE: 70 MMHG | OXYGEN SATURATION: 100 % | HEIGHT: 67 IN | BODY MASS INDEX: 20.34 KG/M2 | WEIGHT: 129.6 LBS

## 2023-10-18 DIAGNOSIS — I73.00 RAYNAUD'S DISEASE WITHOUT GANGRENE: ICD-10-CM

## 2023-10-18 DIAGNOSIS — M54.50 ACUTE LEFT-SIDED LOW BACK PAIN WITHOUT SCIATICA: ICD-10-CM

## 2023-10-18 DIAGNOSIS — K42.9 UMBILICAL HERNIA WITHOUT OBSTRUCTION AND WITHOUT GANGRENE: Primary | ICD-10-CM

## 2023-10-18 DIAGNOSIS — R29.898 LEG FATIGUE: ICD-10-CM

## 2023-10-18 PROCEDURE — 99213 OFFICE O/P EST LOW 20 MIN: CPT | Performed by: NURSE PRACTITIONER

## 2023-10-18 PROCEDURE — 1123F ACP DISCUSS/DSCN MKR DOCD: CPT | Performed by: NURSE PRACTITIONER

## 2023-10-18 RX ORDER — NIFEDIPINE 30 MG/1
30 TABLET, EXTENDED RELEASE ORAL DAILY
Qty: 90 TABLET | Refills: 1 | Status: CANCELLED | OUTPATIENT
Start: 2023-10-18

## 2023-10-18 ASSESSMENT — ENCOUNTER SYMPTOMS
PHOTOPHOBIA: 0
WHEEZING: 0
DIARRHEA: 0
SINUS PAIN: 0
VOICE CHANGE: 0
EYE PAIN: 0
NAUSEA: 0
CONSTIPATION: 0
COUGH: 0
TROUBLE SWALLOWING: 0
ABDOMINAL PAIN: 1
BLOOD IN STOOL: 0
SHORTNESS OF BREATH: 0
BACK PAIN: 0
STRIDOR: 0
SINUS PRESSURE: 0
COLOR CHANGE: 0
SORE THROAT: 0
EYE DISCHARGE: 0
CHEST TIGHTNESS: 0
RHINORRHEA: 0
EYE ITCHING: 0
CHOKING: 0
EYE REDNESS: 0
VOMITING: 0

## 2023-10-20 ENCOUNTER — TELEPHONE (OUTPATIENT)
Dept: FAMILY MEDICINE CLINIC | Age: 68
End: 2023-10-20

## 2023-10-20 NOTE — TELEPHONE ENCOUNTER
PT was seen by MANJIT Leo, and is requesting that she place a referral in system for Physical therapy for her back, as they discussed.      Please call patient to let her know upon completion

## 2023-10-23 DIAGNOSIS — M54.50 ACUTE LEFT-SIDED LOW BACK PAIN WITHOUT SCIATICA: Primary | ICD-10-CM

## 2023-10-26 ENCOUNTER — HOSPITAL ENCOUNTER (OUTPATIENT)
Dept: PHYSICAL THERAPY | Age: 68
Setting detail: THERAPIES SERIES
Discharge: HOME | End: 2023-10-26
Payer: COMMERCIAL

## 2023-10-26 DIAGNOSIS — M54.50 ACUTE BILATERAL LOW BACK PAIN WITHOUT SCIATICA: Primary | ICD-10-CM

## 2023-10-26 PROCEDURE — 97161 PT EVAL LOW COMPLEX 20 MIN: CPT

## 2023-10-26 PROCEDURE — 97110 THERAPEUTIC EXERCISES: CPT

## 2023-10-26 NOTE — PLAN OF CARE
Restrictions:   Reduced participation in work activities  Reduced participation in social activities  Reduced participation in sports/recreation    Classification :   Signs/symptoms consistent with lumbar facet dysfunction    Barriers to/and or personal factors that will affect rehab potential:   Age  Co-morbidities    Prognosis/Rehab Potential:  [] Excellent     [x] Good     [] Fair     [] Poor         Tolerance of evaluation/treatment:   [] Excellent     [x] Good     [] Fair     [] Poor      Physical Therapy Evaluation Complexity Justification  [x] A history of present problem and 1-2 personal factors and/or co-morbidities that impact the plan of care  [x] A total of 1-2 elements  found upon examination of body systems using standardized tests and measures addressing any of the following: body structures, functions (impairments), activity limitations, and/or participation restrictions  [x] A clinical presentation with stable and/or uncomplicated characteristics   [x] Clinical decision making of LOW (02083 - Typically 20 minutes face-to-face) complexity using standardized patient assessment instrument and/or measurable assessment of functional outcome. GOALS     Patient stated goal: To return to pickle ball and dancing. Status: [] Progressing: [] Met: [] Not Met: [] Adjusted    Therapist goals for Patient:   Short Term Goals: To be achieved in: 2 weeks  Independent in HEP and progression per patient tolerance, in order to progress toward full function and prevent re-injury. Status: [] Progressing: [] Met: [] Not Met: [] Adjusted  Patient will have a decrease in pain to 0/10 to help  facilitate improvement in movement, function, and ADLs as indicated by functional deficits. Status: [] Progressing: [] Met: [] Not Met: [] Adjusted    Long Term Goals:  To be achieved in: 6 weeks  Disability index score of 10% or less for the [place] Back Pain Disability Scale to assist with return top prior level of

## 2023-10-26 NOTE — FLOWSHEET NOTE
[facility] and Therapy [address] [place] [address]., [address] [facility], [address] [place] [address] office: [phone] fax: [phone]      Physical Therapy: TREATMENT/PROGRESS NOTE   Patient: Harsh Russo (69 y.o. female)   Treatment Date: 10/26/2023   :  1955 MRN: [id number]   Visit #: 1   Insurance Allowable Auth Needed   30 PCY []Yes    [x]No    Insurance: Payor: Donovan Chin / Plan: Ary Snider [facility] / Product Type: *No Product type* /   Insurance ID: [id number] - ([address] [place] [address])  Secondary Insurance (if applicable):    Treatment Diagnosis:     ICD-10-CM    1.  Acute bilateral low back pain without sciatica  M54.50          Medical Diagnosis:    Acute left-sided low back pain without sciatica [M54.50]   Referring Physician: ASHLEY Peter - *  PCP: Rachell Whitney MD                             Plan of care signed (Y/N):     Date of Patient follow up with Physician:      Progress Report/POC: EVAL today  POC update due: 2023 (OR 10 visits /OR [address], whichever is less)    Precautions: Osteoporosis      Preferred Language for Healthcare:   [x]English       []other:    SUBJECTIVE EXAMINATION     Patient Report/Comments: see eval     Test used Initial score  10/26/23 10/26/2023   Pain Summary VAS 0-3/10    Functional questionnaire [place] Back Pain Disability Scale NPV    Other:                OBJECTIVE EXAMINATION     Observation:     Test measurements: see eval    Exercises/Interventions:     Therapeutic Ex (70436)  resistance Sets/time Reps Notes/Cues/Progressions   Supine Bridge 2x10      Supine Figure 4 Piriformis 5x10\" LLE      Sidelying hip abduction 3x10   bilateral   Supine TA brace 10x10\" hold      Supine TA Brace / March 10x10\" hold      Sciatic nerve glide 3x10 LLE   Supine hook lying          PT Education  10'  Pt was educated on activity modification, initial HEP                 Manual Intervention ([phone])  TIME

## 2023-11-01 ENCOUNTER — OFFICE VISIT (OUTPATIENT)
Dept: DERMATOLOGY | Age: 68
End: 2023-11-01
Payer: COMMERCIAL

## 2023-11-01 DIAGNOSIS — H02.9 EYELID LESION: Primary | ICD-10-CM

## 2023-11-01 DIAGNOSIS — D22.9 MULTIPLE NEVI: ICD-10-CM

## 2023-11-01 DIAGNOSIS — L82.1 SK (SEBORRHEIC KERATOSIS): ICD-10-CM

## 2023-11-01 DIAGNOSIS — L57.0 AK (ACTINIC KERATOSIS): ICD-10-CM

## 2023-11-01 PROCEDURE — 1123F ACP DISCUSS/DSCN MKR DOCD: CPT | Performed by: DERMATOLOGY

## 2023-11-01 PROCEDURE — 17000 DESTRUCT PREMALG LESION: CPT | Performed by: DERMATOLOGY

## 2023-11-01 PROCEDURE — 99213 OFFICE O/P EST LOW 20 MIN: CPT | Performed by: DERMATOLOGY

## 2023-11-01 NOTE — PROGRESS NOTES
[facility]  Iveth Caicedo MD  [phone]      Juliet Russo  1955    79 y.o. female     Date of Visit: 2023    Chief Complaint: skin lesions    History of Present Illness:    1. She presents today for persistent lesion on the right lower eyelid that is gradually enlarged. She is interested in having it removed. 2.  She also reports a persistent scaly lesion on the right upper forehead. 3.  She reports multiple growths on the trunk and left breast.    4.  She has multiple moles on the trunk and extremities-not aware of any changes in size, color, or shape. Review of Systems:  Gen: Feels well, good sense of health. Past Medical History, Family History, Surgical History, Medications and Allergies reviewed.     Past Medical History:   Diagnosis Date    Acne rosacea 2011    Anxiety     Colon polyps     Diabetes mellitus ([address])     pre-diabetic (diet controlled)    Diabetic eye exam ([address]) 4.9.15    [facility];Juan    Diabetic eye exam ([address]) 16    [facility]     Diverticulosis 2013    Dry eyes     Gastroesophageal reflux disease 5/15/2018    Hyperlipidemia     Insufficiency of tear film of both eyes 2016    Mild episode of recurrent major depressive disorder ([address]) 2019    Nuclear sclerosis of both eyes 2016    Pneumothorax     Pregnancy     grava - 2, para - 2, uterine rupture with stillbirth    Uterus didelphus      Past Surgical History:   Procedure Laterality Date     SECTION      2    COLONOSCOPY      COLONOSCOPY N/A 2020    COLONOSCOPY POLYPECTOMY SNARE/COLD BIOPSY performed by Nichole Morrell MD at [address]      septum removed    WISDOM TOOTH EXTRACTION      x 4       Allergies   Allergen Reactions    Bactrim [Sulfamethoxazole-Trimethoprim] Rash    Doxycycline Nausea And Vomiting     Outpatient Medications Marked as Taking for the 23 encounter (Office Visit) with Pierre Balding,

## 2023-11-09 ENCOUNTER — APPOINTMENT (OUTPATIENT)
Dept: PHYSICAL THERAPY | Age: 68
End: 2023-11-09
Payer: COMMERCIAL

## 2023-11-15 ENCOUNTER — TELEPHONE (OUTPATIENT)
Dept: FAMILY MEDICINE CLINIC | Age: 68
End: 2023-11-15

## 2023-11-15 NOTE — TELEPHONE ENCOUNTER
Pt requests a referral to see a Dietician due to her Osteoporosis. She wants to make sure she's eating correctly. She requests that a message is left as she works nights.

## 2023-11-30 ENCOUNTER — HOSPITAL ENCOUNTER (OUTPATIENT)
Dept: PHYSICAL THERAPY | Age: 68
Setting detail: THERAPIES SERIES
Discharge: HOME | End: 2023-11-30
Payer: COMMERCIAL

## 2023-11-30 PROCEDURE — 97110 THERAPEUTIC EXERCISES: CPT

## 2023-11-30 NOTE — FLOWSHEET NOTE
[facility] and Therapy [address] [place] [address]., [address] [facility], [address] [place] [address] office: [phone] fax: [phone]      Physical Therapy: TREATMENT/PROGRESS NOTE   Patient: Sury Russo (89 y.o. female)   Treatment Date: 2023   :  1955 MRN: [id number]   Visit #: 2   Insurance Allowable Auth Needed   30 PCY []Yes    [x]No    Insurance: Payor: Steven Echavarria / Plan: Levi Urrutia [facility] / Product Type: *No Product type* /   Insurance ID: [id number] - ([address] [place] [address])  Secondary Insurance (if applicable):    Treatment Diagnosis:     ICD-10-CM    1. Acute bilateral low back pain without sciatica  M54.50          Medical Diagnosis:    Acute left-sided low back pain without sciatica [M54.50]   Referring Physician: ASHLEY Henry - *  PCP: Francoise Sanchez MD                             Plan of care signed (Y/N): Y    Date of Patient follow up with Physician:      Progress Report/POC: NO  POC update due: 2023 (OR 10 visits /OR Sakshi Deist, whichever is less)    Precautions: Osteoporosis      Preferred Language for Healthcare:   [x]English       []other:    SUBJECTIVE EXAMINATION     Patient Report/Comments:  Pt notes that she has not been consistent with her exercises over the past month. She notes a few of the exercises were bothering her knees. Pt notes that her umbilical hernia has been bothering her after doing some dancing and certain exercises. She notes no real improvements in symptoms and thinks her pain is the worse after dancing for long durations.       Test used Initial score  10/26/23 2023   Pain Summary VAS 0-3/10 0-7/10   Functional questionnaire [place] Back Pain Disability Scale NPV    Other:                OBJECTIVE EXAMINATION     Observation:     Test measurements: see eval    Exercises/Interventions:     Therapeutic Ex (85579)  resistance Sets/time Reps Notes/Cues/Progressions   2x10      5x10\" LLE      3x10

## 2023-12-07 ENCOUNTER — HOSPITAL ENCOUNTER (OUTPATIENT)
Dept: PHYSICAL THERAPY | Age: 68
Setting detail: THERAPIES SERIES
Discharge: HOME | End: 2023-12-07
Payer: COMMERCIAL

## 2023-12-07 PROCEDURE — 97110 THERAPEUTIC EXERCISES: CPT

## 2023-12-07 PROCEDURE — 97112 NEUROMUSCULAR REEDUCATION: CPT

## 2023-12-07 NOTE — FLOWSHEET NOTE
[address] [facility] and Therapy [address] [place] [address]., [address] [facility], [address] [place] [address] office: [phone] fax: [phone]      Physical Therapy: TREATMENT/PROGRESS NOTE   Patient: Kamilah Russo (47 y.o. female)   Treatment Date: 2023   :  1955 MRN: [id number]   Visit #: 3   Insurance Allowable Auth Needed   30 PCY []Yes    [x]No    Insurance: Payor: Stacia Park / Plan: Samia Teodoro [facility] / Product Type: *No Product type* /   Insurance ID: [id number] - ([address] [place] [address])  Secondary Insurance (if applicable):    Treatment Diagnosis:     ICD-10-CM    1. Acute bilateral low back pain without sciatica  M54.50          Medical Diagnosis:    Acute left-sided low back pain without sciatica [M54.50]   Referring Physician: ASHLEY Green - *  PCP: Yovani Cruz MD                             Plan of care signed (Y/N): Y    Date of Patient follow up with Physician:      Progress Report/POC: NO  POC update due: 2023 (OR 10 visits /OR Maru Kaur, whichever is less)    Precautions: Osteoporosis      Preferred Language for Healthcare:   [x]English       []other:    SUBJECTIVE EXAMINATION     Patient Report/Comments:  Pt has no complaints as of today. She notes that she's been more consistent with her HEP.       Test used Initial score  10/26/23 2023   Pain Summary VAS 0-3/10 0-7/10   Functional questionnaire [place] Back Pain Disability Scale NPV    Other:                OBJECTIVE EXAMINATION     Observation:     Test measurements: see eval    Exercises/Interventions:     Therapeutic Ex (08147)  resistance Notes/Cues/Progressions        Hip Extension 3x10 ^ standing bent over bilateral red loop    bilateral   3x10 Start    Seated glute set 10x10\" Start    Wall sit 3x45\" ^   Step up 2x10  bilateral lateral 8\"   SLR 3x10 Start  bilateral red band   Sidelying hip abduction 3x10 Start  red band @ ankles   Side

## 2023-12-12 ENCOUNTER — PROCEDURE VISIT (OUTPATIENT)
Dept: DERMATOLOGY | Age: 68
End: 2023-12-12

## 2023-12-12 DIAGNOSIS — L98.8 WRINKLES: Primary | ICD-10-CM

## 2023-12-12 PROCEDURE — DM00140 PR DERM ONLY BOTOX INJ LEVEL 5: Performed by: DERMATOLOGY

## 2023-12-12 NOTE — PROGRESS NOTES
[facility]  Jennyfer Vázquez MD  [phone]      Alfa SantosAnusha  1955    79 y.o. female     Date of Visit: 12/12/2023     *Dr. 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 S/p few trx of glabellar wrinkles with botox in  and last 9-2023. Glabella was immobile and though improved, still had remaining fixed furrow. Didn't rtn for any other trx after initial trx until 5-2023. No probs with last trx. Happy with results. Would like trx of tails as well for brow lift. No [place] probs, not pregnant or BF. No bleeding probs,. Review of Systems:  Gen: Feels well, good sense of health. No F/C. Past Medical History, Family History, Surgical History, Medications and Allergies reviewed. Outpatient Medications Marked as Taking for the 12/12/23 encounter (Procedure visit) with Earl Hinkle MD   Medication Sig Dispense Refill    nitroglycerin (NITRO-BID) 2 % ointment Apply to effected areas twice a day as needed, avoid touching mouth, face and eyes after applying ointment, if pain continues will need to see rheumatology. 1 each 3    midodrine (PROAMATINE) 2.5 MG tablet Take 1 tablet by mouth daily as needed (hypotension) 30 tablet 2    pravastatin (PRAVACHOL) 20 MG tablet Take 1 tablet by mouth daily 90 tablet 3    alendronate (FOSAMAX) 70 MG tablet Take 1 tablet by mouth every 7 days Take once per week in the morning with a full glass of water, on an empty stomach, and do not take anything else by mouth or lie down for the next 30 minutes. 12 tablet 3    Ascorbic Acid (VITAMIN C) 250 MG tablet Take 1 tablet by mouth daily      bimatoprost (LATISSE) 0.03 % SOLN Apply nightly as directed to upper lids and brows as directed.  5 mL 3    Diclofenac Sodium POWD 2 g by

## 2023-12-12 NOTE — PATIENT INSTRUCTIONS
Post-Injection Care:  Do not lie flat or turn upside down x 4 hrs after treatment. Do not massage treated areas for 24-48 hours (avoid clarisonic-type devices). It is OK to apply makeup and wash your face gently. It can take 2-4 days for onset of effects. Full effects can take up to 10-14 days. Call in the next 1-2 weeks if there are any concerns or questions or if you think additional treatment is needed. Bruising can occur but should not interfere with results. Avoid aspirin, ibuprofen before future treatments. $655    *please note that [facility] will be increasing prices on many elective procedures in 2024. Botox will increase from $12 per unit to $14 per unit due to continued cost increases of the product over the past few years. Estimate for next treatment with same number of units is approximately $322.

## 2023-12-14 ENCOUNTER — HOSPITAL ENCOUNTER (OUTPATIENT)
Dept: PHYSICAL THERAPY | Age: 68
Setting detail: THERAPIES SERIES
End: 2023-12-14
Payer: COMMERCIAL

## 2023-12-21 ENCOUNTER — HOSPITAL ENCOUNTER (OUTPATIENT)
Dept: PHYSICAL THERAPY | Age: 68
Setting detail: THERAPIES SERIES
End: 2023-12-21
Payer: COMMERCIAL

## 2023-12-28 ENCOUNTER — HOSPITAL ENCOUNTER (OUTPATIENT)
Dept: PHYSICAL THERAPY | Age: 68
Setting detail: THERAPIES SERIES
Discharge: HOME | End: 2023-12-28
Payer: COMMERCIAL

## 2023-12-28 PROCEDURE — 97112 NEUROMUSCULAR REEDUCATION: CPT

## 2023-12-28 PROCEDURE — 97110 THERAPEUTIC EXERCISES: CPT

## 2024-02-05 DIAGNOSIS — N95.2 VAGINAL ATROPHY: ICD-10-CM

## 2024-02-05 RX ORDER — ESTRADIOL 10 UG/1
10 INSERT VAGINAL
Qty: 24 TABLET | Refills: 3 | Status: SHIPPED | OUTPATIENT
Start: 2024-02-05 | End: 2024-03-06

## 2024-02-27 DIAGNOSIS — N95.2 VAGINAL ATROPHY: ICD-10-CM

## 2024-02-27 RX ORDER — ESTRADIOL 10 UG/1
10 INSERT VAGINAL
Qty: 24 TABLET | Refills: 3 | Status: SHIPPED | OUTPATIENT
Start: 2024-02-29 | End: 2025-02-28

## 2024-03-11 DIAGNOSIS — G47.00 INSOMNIA, UNSPECIFIED TYPE: ICD-10-CM

## 2024-03-11 NOTE — TELEPHONE ENCOUNTER
Medication:   Requested Prescriptions     Pending Prescriptions Disp Refills    eszopiclone (LUNESTA) 2 MG TABS [Pharmacy Med Name: ESZOPICLONE 2MG TABS] 30 tablet 2     Sig: Take 1 tablet by mouth nightly.        Last Filled: 6/28/23     Patient Phone Number: [phone] (home)     Last appt: 10/18/2023   Next appt: Visit date not found    Last OARRS:        No data to display

## 2024-03-12 RX ORDER — ESZOPICLONE 2 MG/1
2 TABLET, FILM COATED ORAL NIGHTLY
Qty: 30 TABLET | Refills: 2 | Status: SHIPPED | OUTPATIENT
Start: 2024-03-12 | End: 2024-06-10

## 2024-03-20 ENCOUNTER — HOSPITAL ENCOUNTER (OUTPATIENT)
Dept: MAMMOGRAPHY | Age: 69
Discharge: HOME | End: 2024-03-20
Payer: COMMERCIAL

## 2024-03-20 DIAGNOSIS — Z12.31 VISIT FOR SCREENING MAMMOGRAM: ICD-10-CM

## 2024-03-20 PROCEDURE — 77063 BREAST TOMOSYNTHESIS BI: CPT

## 2024-03-28 DIAGNOSIS — L71.9 ACNE ROSACEA: ICD-10-CM

## 2024-03-28 NOTE — TELEPHONE ENCOUNTER
Medication:   Requested Prescriptions     Pending Prescriptions Disp Refills    Diclofenac Sodium POWD 240 g 11     Si g by Does not apply route 4 times daily Formula 5D Diclo 3%  Lido 4%       Last Filled:  3/2/23    Patient Phone Number: [phone] (home)     Last appt: 10/18/2023   Next appt: Visit date not found    Last Labs DM:   Lab Results   Component Value Date/Time    LABA1C 6.0 2023 10:17 AM     Last Lipid:   Lab Results   Component Value Date/Time    CHOL 197 2023 10:17 AM    TRIG 47 2023 10:17 AM    HDL 81 2023 10:17 AM    HDL 92 2012 11:03 AM    LDLCALC 107 2023 10:17 AM     Last PSA: No results found for: \"PSA\"  Last Thyroid:   Lab Results   Component Value Date/Time    TSH 1.29 2014 08:14 AM    T4FREE 1.0 2014 08:14 AM

## 2024-03-28 NOTE — TELEPHONE ENCOUNTER
Medication:   Requested Prescriptions     Pending Prescriptions Disp Refills    propranolol (INDERAL) 20 MG tablet 30 tablet 0     Sig: Take 1 tablet by mouth 2 times daily as needed (performance anxiety)       Last Filled:  10/27/21    Patient Phone Number: [phone] (home)     Last appt: 10/18/2023   Next appt: Visit date not found    Last Labs DM:   Lab Results   Component Value Date/Time    LABA1C 6.0 06/28/2023 10:17 AM     Last Lipid:   Lab Results   Component Value Date/Time    CHOL 197 06/28/2023 10:17 AM    TRIG 47 06/28/2023 10:17 AM    HDL 81 06/28/2023 10:17 AM    HDL 92 03/21/2012 11:03 AM    LDLCALC 107 06/28/2023 10:17 AM     Last PSA: No results found for: \"PSA\"  Last Thyroid:   Lab Results   Component Value Date/Time    TSH 1.29 08/06/2014 08:14 AM    T4FREE 1.0 08/06/2014 08:14 AM       Patient comment: this is for 2 upcoming bachata showcases for performance anxiety.  it worked well for my last showcase a couple years ago .

## 2024-04-01 RX ORDER — PROPRANOLOL HYDROCHLORIDE 20 MG/1
20 TABLET ORAL 2 TIMES DAILY PRN
Qty: 30 TABLET | Refills: 0 | Status: SHIPPED | OUTPATIENT
Start: 2024-04-01

## 2024-04-02 ENCOUNTER — PROCEDURE VISIT (OUTPATIENT)
Dept: DERMATOLOGY | Age: 69
End: 2024-04-02

## 2024-04-02 DIAGNOSIS — L98.8 WRINKLES: Primary | ICD-10-CM

## 2024-04-02 PROCEDURE — DM00160 COSMETIC - PR INJECTION BOTULINUM TOXIN INJ PER UNIT: Performed by: DERMATOLOGY

## 2024-04-02 NOTE — PROGRESS NOTES
episode of recurrent major depressive disorder (HCC) 2019    Nuclear sclerosis of both eyes 2016    Pneumothorax     Pregnancy     grava - 2, para - 2, uterine rupture with stillbirth    Uterus didelphus      Past Surgical History:   Procedure Laterality Date     SECTION      2    COLONOSCOPY      COLONOSCOPY N/A 2020    COLONOSCOPY POLYPECTOMY SNARE/COLD BIOPSY performed by Ryne Wise MD at [facility] ENDOSCOPY    VAGINA SURGERY      septum removed    WISDOM TOOTH EXTRACTION      x 4       Physical Examination     Gen, well-appearing    Glabella with 3+ fixed and dynamic wrinkles with some hooding of upper eyelids at baseline rest  R brow slightly higher at baseline    Wrinkles much shallower than baseline     at rest and with \"mad\" face           Assessment and Plan     1. Wrinkles  - Discussed expectations - discussed will have softening of wrinkles and improvement in dynamic component but not complete resolution   - discussed that some practitioners will inject filler into the fixed remaining wrinkles but ed risk of rare but serious complications from this and I do not do this and would refer her to someone else  - discussed that she may continue to have more improvement with repeated treatments but fixed furrow is almost like \"scar\"  - can consider microneedling or CO2 laser for this area but potential for improvement would be limited as well; I would not expect resolution or even significant improvement with either and dynamic component would remain    - she would like to proceed with botox    Discussed expectations   Ed risks/SE including bruising, incomplete correction and asymmetry.  Pt consented to trx.  Botox (dil 3 ml in 100 units)   Total 0.6 ml to glabella (0.075  0.15  0.15  0.15  0.075)  Total 0.1 ml to tails  ed no lying flat x 4 hrs   ed no massage of treated areas   ed onset   ed full effects can take up to 10-14 days   ed duration     botox - 23 u - 322 (14/u -

## 2024-04-02 NOTE — PATIENT INSTRUCTIONS
Post-Injection Care:  Do not lie flat or turn upside down x 4 hrs after treatment.  Do not massage treated areas for 24-48 hours (avoid clarisonic-type devices).    It is OK to apply makeup and wash your face gently.  It can take 2-4 days for onset of effects.  Full effects can take up to 10-14 days.    Call in the next 1-2 weeks if there are any concerns or questions or if you think additional treatment is needed.    Bruising can occur but should not interfere with results.  Avoid aspirin, ibuprofen before future treatments.    $322   (2024 increase to 14/u)

## 2024-04-10 ENCOUNTER — OFFICE VISIT (OUTPATIENT)
Dept: FAMILY MEDICINE CLINIC | Age: 69
End: 2024-04-10
Payer: COMMERCIAL

## 2024-04-10 VITALS
WEIGHT: 133 LBS | BODY MASS INDEX: 20.88 KG/M2 | DIASTOLIC BLOOD PRESSURE: 72 MMHG | OXYGEN SATURATION: 97 % | HEART RATE: 71 BPM | SYSTOLIC BLOOD PRESSURE: 128 MMHG | HEIGHT: 67 IN

## 2024-04-10 DIAGNOSIS — Z00.00 WELL ADULT EXAM: Primary | ICD-10-CM

## 2024-04-10 DIAGNOSIS — G47.00 INSOMNIA, UNSPECIFIED TYPE: ICD-10-CM

## 2024-04-10 DIAGNOSIS — R73.03 PRE-DIABETES: ICD-10-CM

## 2024-04-10 DIAGNOSIS — M81.0 OSTEOPOROSIS WITHOUT CURRENT PATHOLOGICAL FRACTURE, UNSPECIFIED OSTEOPOROSIS TYPE: ICD-10-CM

## 2024-04-10 PROCEDURE — 90677 PCV20 VACCINE IM: CPT | Performed by: FAMILY MEDICINE

## 2024-04-10 PROCEDURE — 90471 IMMUNIZATION ADMIN: CPT | Performed by: FAMILY MEDICINE

## 2024-04-10 PROCEDURE — 99397 PER PM REEVAL EST PAT 65+ YR: CPT | Performed by: FAMILY MEDICINE

## 2024-04-10 ASSESSMENT — PATIENT HEALTH QUESTIONNAIRE - PHQ9
SUM OF ALL RESPONSES TO PHQ QUESTIONS 1-9: 0
SUM OF ALL RESPONSES TO PHQ9 QUESTIONS 1 & 2: 0
SUM OF ALL RESPONSES TO PHQ QUESTIONS 1-9: 0
1. LITTLE INTEREST OR PLEASURE IN DOING THINGS: NOT AT ALL
SUM OF ALL RESPONSES TO PHQ QUESTIONS 1-9: 0
SUM OF ALL RESPONSES TO PHQ QUESTIONS 1-9: 0
2. FEELING DOWN, DEPRESSED OR HOPELESS: NOT AT ALL

## 2024-04-10 NOTE — PROGRESS NOTES
Alfa Pearllosser (:  1955) is a 68 y.o. female,Established patient, here for evaluation of the following chief complaint(s):  Annual Exam         ASSESSMENT/PLAN:  Alfa was seen today for annual exam.    Diagnoses and all orders for this visit:    Well adult exam  -     Lipid Panel; Future  -     Comprehensive Metabolic Panel; Future  -     TSH with Reflex; Future  -     PTH, Intact; Future  -     Vitamin D 25 Hydroxy; Future  -     Hemoglobin A1C; Future  Good diet and exercise  Pre-diabetes  -     Hemoglobin A1C; Future    Osteoporosis without current pathological fracture, unspecified osteoporosis type  -     TSH with Reflex; Future  -     PTH, Intact; Future  -     Vitamin D 25 Hydroxy; Future  No side effects with fosamax  Insomnia, unspecified type  Stable on prn lunesta  Other orders  -     Pneumococcal, PCV20, PREVNAR 20, (age 6w+), IM, PF         No follow-ups on file.         Subjective   SUBJECTIVE/OBJECTIVE:  HPI  Pt is a of 68 y.o. female comes in today with   Chief Complaint   Patient presents with    Annual Exam     Here for annual. Feeling well.  Has been active.    On fosamax per gyn for osteoporosis. On vit d supplements    Past Medical History:Reviewed  Medications:Reviewed.  Allergies   Allergen Reactions    Bactrim [Sulfamethoxazole-Trimethoprim] Rash    Doxycycline Nausea And Vomiting      Social hx:Reviewed.  Social History     Tobacco Use   Smoking Status Never   Smokeless Tobacco Never        Vitals:    04/10/24 0908   BP: 128/72   Pulse: 71   SpO2: 97%   Weight: 60.3 kg (133 lb)   Height: 1.689 m (5' 6.5\")      Review of Systems       Objective   Physical Exam  Constitutional:       General: She is not in acute distress.     Appearance: Normal appearance. She is well-developed.   HENT:      Head: Normocephalic.      Mouth/Throat:      Pharynx: Oropharynx is clear.   Eyes:      General: No scleral icterus.     Conjunctiva/sclera: Conjunctivae normal.   Neck:

## 2024-04-24 DIAGNOSIS — R73.03 PRE-DIABETES: ICD-10-CM

## 2024-04-24 DIAGNOSIS — M81.0 OSTEOPOROSIS WITHOUT CURRENT PATHOLOGICAL FRACTURE, UNSPECIFIED OSTEOPOROSIS TYPE: ICD-10-CM

## 2024-04-24 DIAGNOSIS — Z00.00 WELL ADULT EXAM: ICD-10-CM

## 2024-04-24 LAB
25(OH)D3 SERPL-MCNC: 36.2 NG/ML
ALBUMIN SERPL-MCNC: 4.6 G/DL (ref 3.4–5)
ALBUMIN/GLOB SERPL: 2.4 {RATIO} (ref 1.1–2.2)
ALP SERPL-CCNC: 34 U/L (ref 40–129)
ALT SERPL-CCNC: 22 U/L (ref 10–40)
ANION GAP SERPL CALCULATED.3IONS-SCNC: 9 MMOL/L (ref 3–16)
AST SERPL-CCNC: 19 U/L (ref 15–37)
BILIRUB SERPL-MCNC: 0.3 MG/DL (ref 0–1)
BUN SERPL-MCNC: 24 MG/DL (ref 7–20)
CALCIUM SERPL-MCNC: 9.2 MG/DL (ref 8.3–10.6)
CHLORIDE SERPL-SCNC: 101 MMOL/L (ref 99–110)
CHOLEST SERPL-MCNC: 249 MG/DL (ref 0–199)
CO2 SERPL-SCNC: 30 MMOL/L (ref 21–32)
CREAT SERPL-MCNC: 0.7 MG/DL (ref 0.6–1.2)
GFR SERPLBLD CREATININE-BSD FMLA CKD-EPI: >90 ML/MIN/{1.73_M2}
GLUCOSE SERPL-MCNC: 99 MG/DL (ref 70–99)
HDLC SERPL-MCNC: 106 MG/DL (ref 40–60)
LDLC SERPL CALC-MCNC: 131 MG/DL
POTASSIUM SERPL-SCNC: 4.2 MMOL/L (ref 3.5–5.1)
PROT SERPL-MCNC: 6.5 G/DL (ref 6.4–8.2)
PTH-INTACT SERPL-MCNC: 22.8 PG/ML (ref 14–72)
SODIUM SERPL-SCNC: 140 MMOL/L (ref 136–145)
TRIGL SERPL-MCNC: 59 MG/DL (ref 0–150)
TSH SERPL DL<=0.005 MIU/L-ACNC: 1.33 UIU/ML (ref 0.27–4.2)
VLDLC SERPL CALC-MCNC: 12 MG/DL

## 2024-04-25 LAB
EST. AVERAGE GLUCOSE BLD GHB EST-MCNC: 116.9 MG/DL
HBA1C MFR BLD: 5.7 %

## 2024-04-29 DIAGNOSIS — E78.2 MIXED HYPERLIPIDEMIA: ICD-10-CM

## 2024-04-29 RX ORDER — OMEGA-3-ACID ETHYL ESTERS 1 G/1
CAPSULE, LIQUID FILLED ORAL
Qty: 360 CAPSULE | Refills: 3 | Status: SHIPPED | OUTPATIENT
Start: 2024-04-29

## 2024-04-29 NOTE — TELEPHONE ENCOUNTER
Medication:   Requested Prescriptions     Pending Prescriptions Disp Refills    omega-3 acid ethyl esters (LOVAZA) 1 g capsule [Pharmacy Med Name: OMEGA-3-ACID ETHYL ESTERS 1GM CAPS] 360 capsule 3     Sig: TAKE FOUR CAPSULES BY MOUTH ONCE A DAY        Last Filled:  1/11/23    Patient Phone Number: [phone] (home)     Last appt: 4/10/2024   Next appt: Visit date not found    Last OARRS:        No data to display

## 2024-05-15 ENCOUNTER — TELEPHONE (OUTPATIENT)
Dept: GYNECOLOGY | Age: 69
End: 2024-05-15

## 2024-05-15 RX ORDER — ALENDRONATE SODIUM 70 MG/1
70 TABLET ORAL
Qty: 12 TABLET | Refills: 3 | Status: SHIPPED | OUTPATIENT
Start: 2024-05-15

## 2024-05-15 RX ORDER — ALENDRONATE SODIUM 70 MG/1
70 TABLET ORAL
Qty: 12 TABLET | Refills: 3 | Status: SHIPPED | OUTPATIENT
Start: 2024-05-15 | End: 2024-05-15 | Stop reason: SDUPTHER

## 2024-05-15 NOTE — TELEPHONE ENCOUNTER
Patient requesting refill on alendronate (FOSAMAX) 70 MG . She's out completely         Patient can be reached at [phone]       [facility] - [address] - P [phone] - F [phone] [[id number]]

## 2024-05-23 ENCOUNTER — OFFICE VISIT (OUTPATIENT)
Dept: GYNECOLOGY | Age: 69
End: 2024-05-23
Payer: COMMERCIAL

## 2024-05-23 VITALS
BODY MASS INDEX: 21.56 KG/M2 | SYSTOLIC BLOOD PRESSURE: 120 MMHG | DIASTOLIC BLOOD PRESSURE: 70 MMHG | RESPIRATION RATE: 17 BRPM | HEIGHT: 67 IN | HEART RATE: 68 BPM | WEIGHT: 137.35 LBS

## 2024-05-23 DIAGNOSIS — Z01.419 WELL WOMAN EXAM WITH ROUTINE GYNECOLOGICAL EXAM: Primary | ICD-10-CM

## 2024-05-23 DIAGNOSIS — M81.8 OTHER OSTEOPOROSIS WITHOUT CURRENT PATHOLOGICAL FRACTURE: ICD-10-CM

## 2024-05-23 PROCEDURE — 1123F ACP DISCUSS/DSCN MKR DOCD: CPT | Performed by: OBSTETRICS & GYNECOLOGY

## 2024-05-23 PROCEDURE — 99213 OFFICE O/P EST LOW 20 MIN: CPT | Performed by: OBSTETRICS & GYNECOLOGY

## 2024-05-29 NOTE — PROGRESS NOTES
(performance anxiety) 30 tablet 0    Diclofenac Sodium POWD 2 g by Does not apply route 4 times daily Formula 5D Diclo 3%  Lido 4% 240 g 2    eszopiclone (LUNESTA) 2 MG TABS Take 1 tablet by mouth nightly for 90 days. Max Daily Amount: 2 mg 30 tablet 2    Estradiol (VAGIFEM) 10 MCG TABS vaginal tablet Place 1 tablet vaginally Twice a Week 24 tablet 3    nitroglycerin (NITRO-BID) 2 % ointment Apply to effected areas twice a day as needed, avoid touching mouth, face and eyes after applying ointment, if pain continues will need to see rheumatology. 1 each 3    midodrine (PROAMATINE) 2.5 MG tablet Take 1 tablet by mouth daily as needed (hypotension) 30 tablet 2    pravastatin (PRAVACHOL) 20 MG tablet Take 1 tablet by mouth daily 90 tablet 3    Ascorbic Acid (VITAMIN C) 250 MG tablet Take 1 tablet by mouth daily      bimatoprost (LATISSE) 0.03 % SOLN Apply nightly as directed to upper lids and brows as directed. 5 mL 3    ondansetron (ZOFRAN) 4 MG tablet Take 1 tablet by mouth every 8 hours as needed for Nausea or Vomiting 15 tablet 1    Calcium Carbonate Antacid (TUMS ULTRA 1000 PO) Take by mouth as needed      Multiple Vitamins-Minerals (THERAPEUTIC MULTIVITAMIN-MINERALS) tablet Take 1 tablet by mouth daily      MINOXIDIL, TOPICAL, 5 % SOLN Apply topically daily       omeprazole (PRILOSEC) 20 MG delayed release capsule Take 1 capsule by mouth daily      docusate sodium (COLACE) 100 MG capsule Take 1 capsule by mouth 2 times daily      Cholecalciferol (VITAMIN D) 2000 UNITS CAPS capsule Take by mouth      naproxen (NAPROSYN) 250 MG tablet Take 2 tablets by mouth 2 times daily (with meals)      Melatonin 3 MG TABS Take 1 tablet by mouth daily      Foot Care Products (TRI-BALANCE ORTHOTICS WOMENS) MISC by Does not apply route. As directed        Family History   Problem Relation Age of Onset    Stroke Mother     Glaucoma Mother     Cataracts Mother     High Cholesterol Mother     High Blood Pressure Mother     Heart

## 2024-06-26 LAB — DIABETIC RETINOPATHY: NEGATIVE

## 2024-06-27 DIAGNOSIS — L71.9 ACNE ROSACEA: ICD-10-CM

## 2024-06-27 NOTE — TELEPHONE ENCOUNTER
Medication:   Requested Prescriptions     Pending Prescriptions Disp Refills    Diclofenac Sodium POWD 240 g 2     Si g by Does not apply route 4 times daily Formula 5D Diclo 3%  Lido 4%       Last Filled:  3/28/24    Patient Phone Number: [phone] (home)     Last appt: 4/10/2024   Next appt: Visit date not found    Last Labs DM:   Lab Results   Component Value Date/Time    LABA1C 5.7 2024 07:49 AM     Last Lipid:   Lab Results   Component Value Date/Time    CHOL 249 2024 07:49 AM    TRIG 59 2024 07:49 AM     2024 07:49 AM    HDL 92 2012 11:03 AM     Last PSA: No results found for: \"PSA\"  Last Thyroid:   Lab Results   Component Value Date/Time    TSH 1.33 2024 07:49 AM    T4FREE 1.0 2014 08:14 AM

## 2024-08-21 LAB
CHOLEST SERPL-MCNC: 257 MG/DL (ref 0–199)
GLUCOSE SERPL-MCNC: 93 MG/DL (ref 70–99)
HDLC SERPL-MCNC: 106 MG/DL (ref 40–60)
LDLC SERPL CALC-MCNC: 138 MG/DL
TRIGL SERPL-MCNC: 65 MG/DL (ref 0–150)

## 2024-09-06 DIAGNOSIS — E78.2 MIXED HYPERLIPIDEMIA: ICD-10-CM

## 2024-09-06 RX ORDER — PRAVASTATIN SODIUM 20 MG
20 TABLET ORAL DAILY
Qty: 90 TABLET | Refills: 3 | Status: SHIPPED | OUTPATIENT
Start: 2024-09-06

## 2024-09-06 NOTE — TELEPHONE ENCOUNTER
Medication:   Requested Prescriptions     Pending Prescriptions Disp Refills    pravastatin (PRAVACHOL) 20 MG tablet 90 tablet 3     Sig: Take 1 tablet by mouth daily       Last Filled:  6/28/24    Patient Phone Number: [phone] (home)     Last appt: 4/10/2024   Next appt: Visit date not found    Last Labs DM:   Lab Results   Component Value Date/Time    LABA1C 5.7 04/24/2024 07:49 AM     Last Lipid:   Lab Results   Component Value Date/Time    CHOL 257 08/21/2024 10:00 AM    TRIG 65 08/21/2024 10:00 AM     08/21/2024 10:00 AM    HDL 92 03/21/2012 11:03 AM     Last PSA: No results found for: \"PSA\"  Last Thyroid:   Lab Results   Component Value Date/Time    TSH 1.33 04/24/2024 07:49 AM    T4FREE 1.0 08/06/2014 08:14 AM

## 2024-10-09 DIAGNOSIS — L71.9 ACNE ROSACEA: ICD-10-CM

## 2024-10-09 NOTE — TELEPHONE ENCOUNTER
Medication:   Requested Prescriptions     Pending Prescriptions Disp Refills    Diclofenac Sodium POWD 240 g 2     Si g by Does not apply route 4 times daily Formula 5D Diclo 3%  Lido 4%        Last Filled:  2024    Patient Phone Number: [phone] (home)     Last appt: 4/10/2024   Next appt: Visit date not found    Last OARRS:        No data to display

## 2024-11-06 ENCOUNTER — OFFICE VISIT (OUTPATIENT)
Dept: DERMATOLOGY | Age: 69
End: 2024-11-06
Payer: COMMERCIAL

## 2024-11-06 DIAGNOSIS — L82.1 SK (SEBORRHEIC KERATOSIS): Primary | ICD-10-CM

## 2024-11-06 DIAGNOSIS — L57.0 AK (ACTINIC KERATOSIS): ICD-10-CM

## 2024-11-06 DIAGNOSIS — D22.9 MULTIPLE NEVI: ICD-10-CM

## 2024-11-06 PROCEDURE — 1123F ACP DISCUSS/DSCN MKR DOCD: CPT | Performed by: DERMATOLOGY

## 2024-11-06 PROCEDURE — 17000 DESTRUCT PREMALG LESION: CPT | Performed by: DERMATOLOGY

## 2024-11-06 PROCEDURE — 99213 OFFICE O/P EST LOW 20 MIN: CPT | Performed by: DERMATOLOGY

## 2024-11-06 NOTE — PROGRESS NOTES
[facility]  Saúl Dangelo MD  [phone]      Alfa GarlandPam  1955    69 y.o. female     Date of Visit: 2024    Chief Complaint: skin lesions    History of Present Illness:    1.  She reports several persistent growths on the upper back.    2.  She has a recurrent lesion on the right upper forehead.    3.  She has multiple moles on the trunk and extremities-not aware of any changes in size, color, or shape.      Review of Systems:  Gen: Feels well, good sense of health.    Past Medical History, Family History, Surgical History, Medications and Allergies reviewed.    Past Medical History:   Diagnosis Date    Acne rosacea 2011    Anxiety     Colon polyps     Diabetes mellitus (HCC)     pre-diabetic (diet controlled)    Diabetic eye exam (HCC) 4.9.15    [facility]    Diabetic eye exam (HCC) 16    [facility]     Diverticulosis 2013    Dry eyes     Gastroesophageal reflux disease 5/15/2018    Hyperlipidemia     Insufficiency of tear film of both eyes 2016    Mild episode of recurrent major depressive disorder (HCC) 2019    Nuclear sclerosis of both eyes 2016    Pneumothorax     Pregnancy     grava - 2, para - 2, uterine rupture with stillbirth    Uterus didelphus      Past Surgical History:   Procedure Laterality Date     SECTION      2    COLONOSCOPY      COLONOSCOPY N/A 2020    COLONOSCOPY POLYPECTOMY SNARE/COLD BIOPSY performed by Ryne Wise MD at [facility] ENDOSCOPY    VAGINA SURGERY      septum removed    WISDOM TOOTH EXTRACTION      x 4       Allergies   Allergen Reactions    Bactrim [Sulfamethoxazole-Trimethoprim] Rash    Doxycycline Nausea And Vomiting     Outpatient Medications Marked as Taking for the 24 encounter (Office Visit) with Saúl Dangelo MD   Medication Sig Dispense Refill    Diclofenac Sodium POWD 2 g by Does not apply route 4 times daily Formula 5D Diclo 3%  Lido 4% 240 g 2

## 2024-12-27 ENCOUNTER — NURSE TRIAGE (OUTPATIENT)
Dept: OTHER | Facility: CLINIC | Age: 69
End: 2024-12-27

## 2024-12-27 NOTE — TELEPHONE ENCOUNTER
Patient calling back after speaking to agent at [facility],   Pt. States closest ER 36 miles away,wants to know if she can be seen at non [facility] facility few miles down the road,   Writer checked approved locations on line, provided information of  Tier 1 covered facilities.     Writer provided Tier 1 [facility] contact information, advised to call ahead to check if they are equipped to treat her injury.

## 2024-12-27 NOTE — TELEPHONE ENCOUNTER
[place] [facility]     Subjective: Caller states fell today12 est, did not noticed uneven cement, landed on right side of her body, right arm was near ribcage, when she went down all the force was on right rib cage, abrasion on right cheek, few minor abrasions right wrist and right knee, thinks has bruised or cracked rib.   PCP Office told to go to ER   Current Symptoms: nothing seen on ribs, minor abrasion on right cheek wrist and knee   Uncomfortable if takes deep breath   Onset: 2 and half hours ago     Associated Symptoms:     Pain Severity: standing pain is 0/10 with certain movement,if raises arm or any lifting causes pain level to be 8/10     Temperature: n/a    What has been tried: 2 naprosyn, topical diclofenac with lidocaine     LMP: NA Pregnant:     Recommended disposition: Go to ED Now  At end of call, Writer transferred caller to member services at [facility] ([place], [place]) for covered ER facilities in her area.   Care advice provided, patient verbalizes understanding; denies any other questions or concerns; instructed to call back for any new or worsening symptoms.    Patient/caller agrees to proceed to the Emergency Department    Attention Provider:  Thank you for allowing me to participate in the care of your patient.  The patient was connected to triage in response to symptoms provided.   Please do not respond through this encounter as the response is not directed to a shared pool.    Reason for Disposition   Can't take a deep breath but no respiratory distress (e.g., hurts to take a deep breath)    Protocols used: Chest Injury-ADULT-[place]

## 2024-12-30 ENCOUNTER — NURSE TRIAGE (OUTPATIENT)
Dept: OTHER | Facility: CLINIC | Age: 69
End: 2024-12-30

## 2024-12-30 ENCOUNTER — OFFICE VISIT (OUTPATIENT)
Dept: URGENT CARE | Age: 69
End: 2024-12-30

## 2024-12-30 VITALS
DIASTOLIC BLOOD PRESSURE: 68 MMHG | HEIGHT: 66 IN | WEIGHT: 138 LBS | BODY MASS INDEX: 22.18 KG/M2 | TEMPERATURE: 98.2 F | OXYGEN SATURATION: 98 % | HEART RATE: 76 BPM | SYSTOLIC BLOOD PRESSURE: 128 MMHG

## 2024-12-30 DIAGNOSIS — S20.211A CONTUSION OF RIB ON RIGHT SIDE, INITIAL ENCOUNTER: Primary | ICD-10-CM

## 2024-12-30 DIAGNOSIS — R07.81 RIB PAIN ON RIGHT SIDE: ICD-10-CM

## 2024-12-30 ASSESSMENT — VISUAL ACUITY: OU: 1

## 2024-12-30 NOTE — PROGRESS NOTES
rib cage x 3 days.    Notes tripped on uneven pavement, and fell on to the right side. Now has right rib pain since onset. Initially wanted to go to the ED for further evaluation, however was on vacation in [place] and didn't want to deal with out of state insurance issues.    Notes having tenderness along the right ribs, and also states a single moment last night when she feels like she may have felt a rib move - however is unsure of what that sensation actually was. Denies any bruising, redness, scrapes, or swelling over the area.    Notes pain is aggravated by pressure over the right lateral ribs, movement of the torso, and lifting of any weight by the right arm.  States initially had more pain with upper arm movement, but now notes that has resolved, and only notes issues when attempting to lift weight with the right arm. Also notes laughing, coughing, and deep breaths also cause discomfort.    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    Has been taking Naproxen and applications of heat have helped decrease the pain.    Notes has also been using a pillow pressed over the area to help with the pain when lifting, laughing, coughing, or when taking deep breaths.    Pt provided HPI by themself - pt considered to be a reliable historian.          VITAL SIGNS  Vitals:    12/30/24 1019 12/30/24 1102   BP: 138/77 128/68  Comment: manual   Site: Right Upper Arm Left Upper Arm   Position: Sitting Sitting   Cuff Size: Medium Adult Medium Adult   Pulse: 76    Temp: 98.2 °F (36.8 °C)    TempSrc: Oral    SpO2: 98%    Weight: 62.6 kg (138 lb)    Height: 1.676 m (5' 6\")        Review of Systems  Pertinent positives and negatives as above, or may be included within the HPI.    Physical Exam  Vitals reviewed.   Constitutional:       General: She is not in acute distress.     Appearance: Normal appearance. She is not ill-appearing.   HENT:      Head: Normocephalic and atraumatic.      Right

## 2024-12-30 NOTE — TELEPHONE ENCOUNTER
Location of [place]: [place]    Subjective: Caller states \"rib injury\"     Current Symptoms: Called on 12/27 due to a fall.  Did not go to ED at that time.  Calling today to go to ED.  Still having rib pain.  Pain will come and go  Pain can get up to a 10 with certain movement.  Denies bruising  Will hurt with deep breathing.  She tried calling her PCP but they have no appts today.    Onset:   12/27    Associated Symptoms: NA    Pain Severity: 10/10    Temperature: denies     What has been tried: Naproxen    LMP: NA Pregnant: NA    Recommended disposition: Go to ED Now    Care advice provided, patient verbalizes understanding; denies any other questions or concerns; instructed to call back for any new or worsening symptoms.    Patient states will go to [facility].     Attention Provider:  Thank you for allowing me to participate in the care of your patient.  The patient was connected to triage in response to symptoms provided.   Please do not respond through this encounter as the response is not directed to a shared pool.    Reason for Disposition   SEVERE chest pain    Protocols used: Chest Injury-ADULT-[place]

## 2024-12-30 NOTE — PATIENT INSTRUCTIONS
Initial X-ray findings show no concerns for visible rib fractures.  We will notify you of any findings that are different should the official radiology impression note any other findings.    Recommend continuing with your current Naprosyn regimen to help with pain relief.  Can add Tylenol between doses for further pain management, if necessary - recommend 500 mg of acetaminophen (Extra Strength Tylenol) every 6 hours. Do not exceed 4,000 mg (4 grams) of acetaminophen in a 24 hour period from all sources.  Recommend applying warm or cold compresses (which ever feels best) intermittently as needed over the area of tenderness.  Cold compresses for 15-20 minutes, with 30-60 minutes between applications.  If your symptoms persist for more than 2 weeks without any evidence of decrease, or if you experience any worsening pain, swelling, or you develop other concerns regarding the injury, follow up with your PCP for further evaluation.

## 2025-01-22 ENCOUNTER — OFFICE VISIT (OUTPATIENT)
Dept: FAMILY MEDICINE CLINIC | Age: 70
End: 2025-01-22
Payer: COMMERCIAL

## 2025-01-22 VITALS
OXYGEN SATURATION: 98 % | BODY MASS INDEX: 22.66 KG/M2 | DIASTOLIC BLOOD PRESSURE: 78 MMHG | HEART RATE: 69 BPM | SYSTOLIC BLOOD PRESSURE: 116 MMHG | WEIGHT: 141 LBS | HEIGHT: 66 IN

## 2025-01-22 DIAGNOSIS — R73.03 PRE-DIABETES: ICD-10-CM

## 2025-01-22 DIAGNOSIS — G47.00 INSOMNIA, UNSPECIFIED TYPE: ICD-10-CM

## 2025-01-22 DIAGNOSIS — E78.2 MIXED HYPERLIPIDEMIA: ICD-10-CM

## 2025-01-22 DIAGNOSIS — Z12.11 COLON CANCER SCREENING: ICD-10-CM

## 2025-01-22 DIAGNOSIS — E55.9 VITAMIN D DEFICIENCY: ICD-10-CM

## 2025-01-22 DIAGNOSIS — R53.83 FATIGUE, UNSPECIFIED TYPE: Primary | ICD-10-CM

## 2025-01-22 PROCEDURE — 99214 OFFICE O/P EST MOD 30 MIN: CPT | Performed by: FAMILY MEDICINE

## 2025-01-22 PROCEDURE — 1123F ACP DISCUSS/DSCN MKR DOCD: CPT | Performed by: FAMILY MEDICINE

## 2025-01-22 RX ORDER — ESZOPICLONE 2 MG/1
2 TABLET, FILM COATED ORAL NIGHTLY
Qty: 30 TABLET | Refills: 2 | Status: SHIPPED | OUTPATIENT
Start: 2025-01-22 | End: 2025-04-22

## 2025-01-22 SDOH — ECONOMIC STABILITY: FOOD INSECURITY: WITHIN THE PAST 12 MONTHS, THE FOOD YOU BOUGHT JUST DIDN'T LAST AND YOU DIDN'T HAVE MONEY TO GET MORE.: NEVER TRUE

## 2025-01-22 SDOH — ECONOMIC STABILITY: FOOD INSECURITY: WITHIN THE PAST 12 MONTHS, YOU WORRIED THAT YOUR FOOD WOULD RUN OUT BEFORE YOU GOT MONEY TO BUY MORE.: NEVER TRUE

## 2025-01-22 ASSESSMENT — PATIENT HEALTH QUESTIONNAIRE - PHQ9
SUM OF ALL RESPONSES TO PHQ QUESTIONS 1-9: 3
7. TROUBLE CONCENTRATING ON THINGS, SUCH AS READING THE NEWSPAPER OR WATCHING TELEVISION: NOT AT ALL
SUM OF ALL RESPONSES TO PHQ QUESTIONS 1-9: 3
2. FEELING DOWN, DEPRESSED OR HOPELESS: NOT AT ALL
4. FEELING TIRED OR HAVING LITTLE ENERGY: SEVERAL DAYS
SUM OF ALL RESPONSES TO PHQ QUESTIONS 1-9: 3
5. POOR APPETITE OR OVEREATING: NOT AT ALL
SUM OF ALL RESPONSES TO PHQ9 QUESTIONS 1 & 2: 0
SUM OF ALL RESPONSES TO PHQ QUESTIONS 1-9: 3
8. MOVING OR SPEAKING SO SLOWLY THAT OTHER PEOPLE COULD HAVE NOTICED. OR THE OPPOSITE, BEING SO FIGETY OR RESTLESS THAT YOU HAVE BEEN MOVING AROUND A LOT MORE THAN USUAL: NOT AT ALL
9. THOUGHTS THAT YOU WOULD BE BETTER OFF DEAD, OR OF HURTING YOURSELF: NOT AT ALL
1. LITTLE INTEREST OR PLEASURE IN DOING THINGS: NOT AT ALL
3. TROUBLE FALLING OR STAYING ASLEEP: MORE THAN HALF THE DAYS
10. IF YOU CHECKED OFF ANY PROBLEMS, HOW DIFFICULT HAVE THESE PROBLEMS MADE IT FOR YOU TO DO YOUR WORK, TAKE CARE OF THINGS AT HOME, OR GET ALONG WITH OTHER PEOPLE: NOT DIFFICULT AT ALL
6. FEELING BAD ABOUT YOURSELF - OR THAT YOU ARE A FAILURE OR HAVE LET YOURSELF OR YOUR FAMILY DOWN: NOT AT ALL

## 2025-01-22 NOTE — PROGRESS NOTES
Alfa GarlandPam (:  1955) is a 69 y.o. female,Established patient, here for evaluation of the following chief complaint(s):  Other (Increase of weight and hair loss)      Assessment & Plan   ASSESSMENT/PLAN:  Alfa was seen today for other.    Diagnoses and all orders for this visit:    Fatigue, unspecified type  -     Testosterone, free, total; Future  -     TSH reflex to FT4; Future  -     CBC with Auto Differential; Future  -     Vitamin B12 & Folate; Future  Acute. blood work to evaluate   Insomnia, unspecified type  -     eszopiclone (LUNESTA) 2 MG TABS; Take 1 tablet by mouth nightly for 90 days. Max Daily Amount: 2 mg  Stable on lunesta  Pre-diabetes  -     Hemoglobin A1C; Future  Stable with diet  Vitamin D deficiency  -     Vitamin D 25 Hydroxy; Future    Mixed hyperlipidemia  -     Lipid Panel; Future  -     Comprehensive Metabolic Panel; Future      Colon cancer screening  -     Jan Hutchinson MD, Gastroenterology, [facility]         No follow-ups on file.         Subjective   SUBJECTIVE/OBJECTIVE:  HPI  Pt is a of 69 y.o. female comes in today with   Chief Complaint   Patient presents with    Other     Increase of weight and hair loss     Struggles with sleep.   Shift work which makes it harder.   Lunesta helps.  Hair loss. On topical minoxidil.  Good relief with topical minoxidil.    Review of Systems       Objective   Physical Exam         An electronic signature was used to authenticate this note.    --Konstantin Hinkle MD

## 2025-01-29 DIAGNOSIS — L71.9 ACNE ROSACEA: ICD-10-CM

## 2025-01-29 NOTE — TELEPHONE ENCOUNTER
Medication:   Requested Prescriptions     Pending Prescriptions Disp Refills    Diclofenac Sodium POWD 240 g 2     Si g by Does not apply route 4 times daily Formula 5D Diclo 3%  Lido 4%       Last Filled:  10/9/24    Patient Phone Number: [phone] (home)     Last appt: 2025   Next appt: Visit date not found    Last Labs DM:   Lab Results   Component Value Date/Time    LABA1C 5.7 2024 07:49 AM     Last Lipid:   Lab Results   Component Value Date/Time    CHOL 257 2024 10:00 AM    TRIG 65 2024 10:00 AM     2024 10:00 AM    HDL 92 2012 11:03 AM     Last PSA: No results found for: \"PSA\"  Last Thyroid:   Lab Results   Component Value Date/Time    TSH 1.33 2024 07:49 AM    T4FREE 1.0 2014 08:14 AM

## 2025-02-28 ENCOUNTER — OFFICE VISIT (OUTPATIENT)
Dept: FAMILY MEDICINE CLINIC | Age: 70
End: 2025-02-28
Payer: COMMERCIAL

## 2025-02-28 VITALS
DIASTOLIC BLOOD PRESSURE: 64 MMHG | OXYGEN SATURATION: 98 % | BODY MASS INDEX: 22.82 KG/M2 | HEART RATE: 74 BPM | WEIGHT: 137 LBS | SYSTOLIC BLOOD PRESSURE: 108 MMHG | HEIGHT: 65 IN

## 2025-02-28 DIAGNOSIS — M25.511 ACUTE PAIN OF RIGHT SHOULDER: Primary | ICD-10-CM

## 2025-02-28 DIAGNOSIS — R11.2 NAUSEA AND VOMITING, UNSPECIFIED VOMITING TYPE: ICD-10-CM

## 2025-02-28 DIAGNOSIS — R55 VASOVAGAL SYNCOPE: ICD-10-CM

## 2025-02-28 DIAGNOSIS — R73.03 PRE-DIABETES: ICD-10-CM

## 2025-02-28 DIAGNOSIS — E78.2 MIXED HYPERLIPIDEMIA: ICD-10-CM

## 2025-02-28 DIAGNOSIS — R53.83 FATIGUE, UNSPECIFIED TYPE: ICD-10-CM

## 2025-02-28 DIAGNOSIS — E55.9 VITAMIN D DEFICIENCY: ICD-10-CM

## 2025-02-28 LAB
25(OH)D3 SERPL-MCNC: 39.9 NG/ML
ALBUMIN SERPL-MCNC: 4.1 G/DL (ref 3.4–5)
ALBUMIN/GLOB SERPL: 1.9 {RATIO} (ref 1.1–2.2)
ALP SERPL-CCNC: 39 U/L (ref 40–129)
ALT SERPL-CCNC: 26 U/L (ref 10–40)
ANION GAP SERPL CALCULATED.3IONS-SCNC: 11 MMOL/L (ref 3–16)
AST SERPL-CCNC: 26 U/L (ref 15–37)
BASOPHILS # BLD: 0 K/UL (ref 0–0.2)
BASOPHILS NFR BLD: 0.2 %
BILIRUB SERPL-MCNC: <0.2 MG/DL (ref 0–1)
BUN SERPL-MCNC: 21 MG/DL (ref 7–20)
CALCIUM SERPL-MCNC: 8.2 MG/DL (ref 8.3–10.6)
CHLORIDE SERPL-SCNC: 107 MMOL/L (ref 99–110)
CHOLEST SERPL-MCNC: 207 MG/DL (ref 0–199)
CO2 SERPL-SCNC: 22 MMOL/L (ref 21–32)
CREAT SERPL-MCNC: 0.6 MG/DL (ref 0.6–1.2)
DEPRECATED RDW RBC AUTO: 13.2 % (ref 12.4–15.4)
EOSINOPHIL # BLD: 0.1 K/UL (ref 0–0.6)
EOSINOPHIL NFR BLD: 2.3 %
EST. AVERAGE GLUCOSE BLD GHB EST-MCNC: 116.9 MG/DL
FOLATE SERPL-MCNC: 19.8 NG/ML (ref 4.78–24.2)
GFR SERPLBLD CREATININE-BSD FMLA CKD-EPI: >90 ML/MIN/{1.73_M2}
GLUCOSE SERPL-MCNC: 89 MG/DL (ref 70–99)
HBA1C MFR BLD: 5.7 %
HCT VFR BLD AUTO: 44.6 % (ref 36–48)
HDLC SERPL-MCNC: 83 MG/DL (ref 40–60)
HGB BLD-MCNC: 14.9 G/DL (ref 12–16)
LDLC SERPL CALC-MCNC: 110 MG/DL
LYMPHOCYTES # BLD: 1.5 K/UL (ref 1–5.1)
LYMPHOCYTES NFR BLD: 32.7 %
MCH RBC QN AUTO: 32.3 PG (ref 26–34)
MCHC RBC AUTO-ENTMCNC: 33.3 G/DL (ref 31–36)
MCV RBC AUTO: 97.1 FL (ref 80–100)
MONOCYTES # BLD: 0.5 K/UL (ref 0–1.3)
MONOCYTES NFR BLD: 10.7 %
NEUTROPHILS # BLD: 2.5 K/UL (ref 1.7–7.7)
NEUTROPHILS NFR BLD: 54.1 %
PLATELET # BLD AUTO: 184 K/UL (ref 135–450)
PMV BLD AUTO: 8.4 FL (ref 5–10.5)
POTASSIUM SERPL-SCNC: 4 MMOL/L (ref 3.5–5.1)
PROT SERPL-MCNC: 6.3 G/DL (ref 6.4–8.2)
RBC # BLD AUTO: 4.6 M/UL (ref 4–5.2)
SODIUM SERPL-SCNC: 140 MMOL/L (ref 136–145)
TRIGL SERPL-MCNC: 69 MG/DL (ref 0–150)
TSH SERPL DL<=0.005 MIU/L-ACNC: 2.96 UIU/ML (ref 0.27–4.2)
VIT B12 SERPL-MCNC: 1056 PG/ML (ref 211–911)
VLDLC SERPL CALC-MCNC: 14 MG/DL
WBC # BLD AUTO: 4.5 K/UL (ref 4–11)

## 2025-02-28 PROCEDURE — 99213 OFFICE O/P EST LOW 20 MIN: CPT | Performed by: NURSE PRACTITIONER

## 2025-02-28 PROCEDURE — 1123F ACP DISCUSS/DSCN MKR DOCD: CPT | Performed by: NURSE PRACTITIONER

## 2025-02-28 NOTE — PROGRESS NOTES
Alfa GarlandPam (:  1955) is a 69 y.o. female,Established patient, here for evaluation of the following chief complaint(s):  Shoulder Injury (Fell yesterday shoulder pain , /Stomach flu Wednesday but better )       Assessment & Plan  Acute pain of right shoulder    Not progressing;  X-ray ordered.  Continue stretching, ice  and Tylenol PRN.    Orders:    XR SHOULDER RIGHT (MIN 2 VIEWS); Future    Vasovagal syncope    Not progressing;  Discussed wearing compression stockings, patient v/u.  Offered a referral to cardiology to consider a tilt table test, patient declines.           Nausea and vomiting, unspecified vomiting type    Improved;  Symptoms have resolved.           No follow-ups on file.       Subjective   HPI  Fell yesterday- right shoulder  Had episodes x 3- vasovagal when feeling nauseous  Has compression stockings at home; ordered an abdominal binder (daughter is an OT)  Thinks she supported her head for a while with her arm  Feels like she is tight  No weakness or numbness  Lovaza, naproxen, topical cream for inflammation- now feeling like an overuse issues    Stomach flu- Wednesday  Vomiting and nausea  No diarrhea  No fevers  No chills  No abdominal pain  Is better- has been eating and drinking    Review of Systems       Objective   Physical Exam  Vitals reviewed.   Constitutional:       Appearance: Normal appearance.   HENT:      Head: Normocephalic.      Right Ear: External ear normal.      Left Ear: External ear normal.      Nose: Nose normal.   Cardiovascular:      Rate and Rhythm: Normal rate and regular rhythm.      Heart sounds: Normal heart sounds, S1 normal and S2 normal.   Pulmonary:      Effort: Pulmonary effort is normal.      Breath sounds: Normal breath sounds and air entry.   Abdominal:      Palpations: Abdomen is soft.      Tenderness: There is no abdominal tenderness.   Musculoskeletal:      Right shoulder: Tenderness present. No swelling. Decreased range of

## 2025-03-04 LAB
SHBG SERPL-SCNC: 85 NMOL/L (ref 17–125)
TESTOST FREE SERPL-MCNC: ABNORMAL PG/ML (ref 0.6–3.8)
TESTOST SERPL-MCNC: <3 NG/DL (ref 3–41)

## 2025-03-08 ENCOUNTER — RESULTS FOLLOW-UP (OUTPATIENT)
Dept: FAMILY MEDICINE CLINIC | Age: 70
End: 2025-03-08

## 2025-03-11 ENCOUNTER — HOSPITAL ENCOUNTER (OUTPATIENT)
Dept: GENERAL RADIOLOGY | Age: 70
Discharge: HOME | End: 2025-03-11
Payer: COMMERCIAL

## 2025-03-11 DIAGNOSIS — M25.511 ACUTE PAIN OF RIGHT SHOULDER: ICD-10-CM

## 2025-03-11 PROCEDURE — 73030 X-RAY EXAM OF SHOULDER: CPT

## 2025-03-12 ENCOUNTER — RESULTS FOLLOW-UP (OUTPATIENT)
Dept: GENERAL RADIOLOGY | Age: 70
End: 2025-03-12

## 2025-03-14 SDOH — HEALTH STABILITY: PHYSICAL HEALTH: ON AVERAGE, HOW MANY DAYS PER WEEK DO YOU ENGAGE IN MODERATE TO STRENUOUS EXERCISE (LIKE A BRISK WALK)?: 3 DAYS

## 2025-03-14 SDOH — HEALTH STABILITY: PHYSICAL HEALTH: ON AVERAGE, HOW MANY MINUTES DO YOU ENGAGE IN EXERCISE AT THIS LEVEL?: 150+ MIN

## 2025-03-17 ENCOUNTER — OFFICE VISIT (OUTPATIENT)
Dept: ORTHOPEDIC SURGERY | Age: 70
End: 2025-03-17
Payer: COMMERCIAL

## 2025-03-17 VITALS — BODY MASS INDEX: 22.18 KG/M2 | WEIGHT: 138 LBS | HEIGHT: 66 IN

## 2025-03-17 DIAGNOSIS — M75.81 TENDINITIS OF RIGHT ROTATOR CUFF: ICD-10-CM

## 2025-03-17 DIAGNOSIS — M25.512 LEFT SHOULDER PAIN, UNSPECIFIED CHRONICITY: Primary | ICD-10-CM

## 2025-03-17 DIAGNOSIS — M19.012 DEGENERATIVE JOINT DISEASE OF LEFT ACROMIOCLAVICULAR JOINT: ICD-10-CM

## 2025-03-17 DIAGNOSIS — S43.51XA ACROMIOCLAVICULAR SPRAIN, RIGHT, INITIAL ENCOUNTER: ICD-10-CM

## 2025-03-17 PROCEDURE — 1123F ACP DISCUSS/DSCN MKR DOCD: CPT | Performed by: ORTHOPAEDIC SURGERY

## 2025-03-17 PROCEDURE — 99204 OFFICE O/P NEW MOD 45 MIN: CPT | Performed by: ORTHOPAEDIC SURGERY

## 2025-03-17 RX ORDER — MELOXICAM 15 MG/1
15 TABLET ORAL DAILY
Qty: 30 TABLET | Refills: 2 | Status: SHIPPED | OUTPATIENT
Start: 2025-03-17

## 2025-03-17 NOTE — PROGRESS NOTES
Date:  3/16/2025    Name:  Alfa Russo  Address:  [address] [place] [id number]    :  1955      Age:   69 y.o.    SSN:  [id number]      Medical Record Number:  [id number]    Reason for Visit:    Chief Complaint    Shoulder Pain (New patient right shoulder )      DOS:3/17/2025     HPI: Alfa Russo is a 69 y.o. female here today for bilateral shoulder pain, more pronounced on the right side.  Patient reports a fall 3 weeks ago due to a vasovagal episode.  She does not recall the exact mechanism of the fall.  She initially did not have any symptoms after the fall.  2 days later she noticed some bruising along the upper aspect of her shoulder.  She continued to rest her shoulder and reported minimal pain.  1 week after her fall she went back to dancing and notes achy pain along her shoulder.  This continued to exacerbate after each dancing session.  She decided to rest her shoulder which she believes significantly helped her symptoms.  She has also been taking over-the-counter anti-inflammatories with significant improvement as well.  She does not report any weakness or numbness along her right upper extremity.  Prior to the fall she does not recall any major issues with her right shoulder    On the left side patient has been dealing with chronic shoulder pain.  She has been managing this well with conservative measures.  She wanted to have it checked today to make sure she does not have any major injury.         ROS: Review of systems reviewed from Patient History Form completed today and available in the patient's chart under the Media tab.       Past Medical History:   Diagnosis Date    Acne rosacea 2011    Anxiety     Colon polyps     Diabetes mellitus (HCC)     pre-diabetic (diet controlled)    Diabetic eye exam ([facility]) 4.9.15    [facility]    Diabetic eye exam (HCC) 16    [facility]     Diverticulosis 2013    Dry eyes     Gastroesophageal reflux

## 2025-03-19 ENCOUNTER — HOSPITAL ENCOUNTER (OUTPATIENT)
Dept: PHYSICAL THERAPY | Age: 70
Setting detail: THERAPIES SERIES
Discharge: HOME | End: 2025-03-19
Attending: ORTHOPAEDIC SURGERY
Payer: COMMERCIAL

## 2025-03-19 DIAGNOSIS — M25.512 LEFT SHOULDER PAIN, UNSPECIFIED CHRONICITY: ICD-10-CM

## 2025-03-19 DIAGNOSIS — M25.511 RIGHT SHOULDER PAIN, UNSPECIFIED CHRONICITY: Primary | ICD-10-CM

## 2025-03-19 PROCEDURE — 97161 PT EVAL LOW COMPLEX 20 MIN: CPT | Performed by: PHYSICAL THERAPIST

## 2025-03-19 PROCEDURE — 97110 THERAPEUTIC EXERCISES: CPT | Performed by: PHYSICAL THERAPIST

## 2025-03-19 NOTE — PLAN OF CARE
[facility] - Outpatient Rehabilitation and Therapy: [address], [place] [address] office: [phone] fax: [phone]     Physical Therapy Initial Evaluation Certification      Dear Malcolm Cadena MD ,    We had the pleasure of evaluating the following patient for physical therapy services at [facility].  A summary of our findings can be found in the initial assessment below.  This includes our plan of care.  If you have any questions or concerns regarding these findings, please do not hesitate to contact me at the office phone number listed above.  Thank you for the referral.     Physician Signature:_______________________________Date:__________________  By signing above (or electronic signature), therapist’s plan is approved by physician       Physical Therapy: TREATMENT/PROGRESS NOTE   Patient: Alfa Russo (69 y.o. female)   Examination Date: 2025   :  1955 MRN: [id number]   Visit #: 1   Insurance Allowable Auth Needed   30 []Yes    [x]No    Insurance: Payor: R / Plan: [employer] EMPLOYEES / Product Type: *No Product type* /   Insurance ID: [id number] - (Commercial)  Secondary Insurance (if applicable):    Treatment Diagnosis:     ICD-10-CM    1. Right shoulder pain, unspecified chronicity  M25.511       2. Left shoulder pain, unspecified chronicity  M25.512          Medical Diagnosis:  Left shoulder pain, unspecified chronicity [M25.512]  Acromioclavicular sprain, right, initial encounter [S43.51XA]  Tendinitis of right rotator cuff [M75.81]  Degenerative joint disease of left acromioclavicular joint [M19.012]   Referring Physician: Malcolm Cadena MD  PCP: Konstantin Hinkle MD     Plan of care signed (Y/N):     Date of Patient follow up with Physician:      Plan of Care Report: EVAL today  POC update due: (10 visits /OR AUTH LIMITS, whichever is less)  2025                                             Medical

## 2025-03-26 ENCOUNTER — HOSPITAL ENCOUNTER (OUTPATIENT)
Dept: PHYSICAL THERAPY | Age: 70
Setting detail: THERAPIES SERIES
Discharge: HOME | End: 2025-03-26
Attending: ORTHOPAEDIC SURGERY
Payer: COMMERCIAL

## 2025-03-26 PROCEDURE — 97110 THERAPEUTIC EXERCISES: CPT | Performed by: PHYSICAL THERAPIST

## 2025-03-26 PROCEDURE — 97530 THERAPEUTIC ACTIVITIES: CPT | Performed by: PHYSICAL THERAPIST

## 2025-03-26 PROCEDURE — 97112 NEUROMUSCULAR REEDUCATION: CPT | Performed by: PHYSICAL THERAPIST

## 2025-03-26 NOTE — FLOWSHEET NOTE
flexibility, endurance, ROM performed to prevent loss of range of motion, maintain or improve muscular strength or increase flexibility, following either an injury or surgery.   (83938) NEUROMUSCULAR RE-EDUCATION - Provided therapeutic procedure on activities related to neuromuscular reeducation of movement, balance, coordination, kinesthetic sense, posture, and/or proprioception for sitting and/or standing activities. Provided HEP review and/or progression.    GOALS     Patient stated goal: Get back to PLOF  [] Progressing: [] Met: [] Not Met: [] Adjusted    Therapist goals for Patient:   Short Term Goals: To be achieved in: 2 weeks  1Independent in HEP and progression per patient tolerance, in order to prevent re-injury.   [] Progressing: [] Met: [] Not Met: [] Adjusted  Patient will have a decrease in pain to < 0/10 to facilitate improvement in movement, function, and ADLs as indicated by Functional Deficits.  [] Progressing: [] Met: [] Not Met: [] Adjusted    Long Term Goals: To be achieved in: 8 weeks  Disability index score of 10% or less for the Upper Extremity functional Scale to assist with reaching prior level of function with activities such as dancing.  [] Progressing: [] Met: [] Not Met: [] Adjusted  Patient will demonstrate increased AROM of bilateral shoulders to WNL without pain to allow for proper joint functioning to enable patient to .   [] Progressing: [] Met: [] Not Met: [] Adjusted  Patient will demonstrate increased Strength of bilateral UE to at least 4+/5 throughout without pain to allow for proper functional mobility to enable patient to return to opening a jar.   [] Progressing: [] Met: [] Not Met: [] Adjusted  Patient will return to reaching behind the back without increased symptoms or restriction.   [] Progressing: [] Met: [] Not Met: [] Adjusted  Patient will be able to reach over head to groom hair without restriction.  [] Progressing: [] Met: [] Not Met: [] Adjusted       Overall

## 2025-04-01 ENCOUNTER — HOSPITAL ENCOUNTER (OUTPATIENT)
Dept: PHYSICAL THERAPY | Age: 70
Setting detail: THERAPIES SERIES
Discharge: HOME | End: 2025-04-01
Attending: ORTHOPAEDIC SURGERY
Payer: COMMERCIAL

## 2025-04-01 PROCEDURE — 97110 THERAPEUTIC EXERCISES: CPT | Performed by: PHYSICAL THERAPIST

## 2025-04-01 PROCEDURE — 97112 NEUROMUSCULAR REEDUCATION: CPT | Performed by: PHYSICAL THERAPIST

## 2025-04-01 NOTE — FLOWSHEET NOTE
[facility] - Outpatient Rehabilitation and Therapy: [address], [place] [address] office: [phone] fax: [phone]       Physical Therapy: TREATMENT/PROGRESS NOTE   Patient: Alfa Russo (69 y.o. female)   Examination Date: 2025   :  1955 MRN: [id number]   Visit #: 3   Insurance Allowable Auth Needed   30 []Yes    [x]No    Insurance: Payor: [facility] / Plan: [employer] [facility] EMPLOYEES / Product Type: *No Product type* /   Insurance ID: [id number] - (Commercial)  Secondary Insurance (if applicable):    Treatment Diagnosis:     ICD-10-CM    1. Right shoulder pain, unspecified chronicity  M25.511       2. Left shoulder pain, unspecified chronicity  M25.512          Medical Diagnosis:  Left shoulder pain, unspecified chronicity [M25.512]  Acromioclavicular sprain, right, initial encounter [S43.51XA]  Tendinitis of right rotator cuff [M75.81]  Degenerative joint disease of left acromioclavicular joint [M19.012]   Referring Physician: Malcolm Cadena MD  PCP: Konstantin Hinkle MD     Plan of care signed (Y/N):     Date of Patient follow up with Physician:      Plan of Care Report: NO  POC update due: (10 visits /OR AUTH LIMITS, whichever is less)  2025                                             Medical History:  Comorbidities:  Osteoporosis/Osteopenia  Osteoarthritis  Anxiety  Relevant Medical History:                                          Precautions/ Contra-indications:           Latex allergy:  NO  Pacemaker:    NO  Contraindications for Manipulation: None  Date of Surgery:   Other:     Red Flags:  None    Suicide Screening:   The patient did not verbalize a primary behavioral concern, suicidal ideation, suicidal intent, or demonstrate suicidal behaviors.    Preferred Language for Healthcare:   [x] English       [] other:    SUBJECTIVE EXAMINATION     Patient stated complaint: Pt reports she has a little soreness this morning.          Test used Initial

## 2025-04-02 DIAGNOSIS — N95.2 VAGINAL ATROPHY: ICD-10-CM

## 2025-04-02 RX ORDER — ESTRADIOL 10 UG/1
10 TABLET, FILM COATED VAGINAL
Qty: 24 TABLET | Refills: 3 | Status: SHIPPED | OUTPATIENT
Start: 2025-04-03 | End: 2025-04-03 | Stop reason: SDUPTHER

## 2025-04-02 NOTE — TELEPHONE ENCOUNTER
Medication:   Requested Prescriptions     Pending Prescriptions Disp Refills    ondansetron (ZOFRAN) 4 MG tablet 15 tablet 1     Sig: Take 1 tablet by mouth every 8 hours as needed for Nausea or Vomiting        Last Filled:  4/8/22      Pended to: [facility] [id number] - [place] - [address] - P [phone] - F [phone]     Patient Phone Number: [phone] (home)     Last appt: 2/28/2025   Next appt: Visit date not found    Last OARRS:        No data to display

## 2025-04-02 NOTE — TELEPHONE ENCOUNTER
Patient called asking to   Please stop med Ondansetron 4 mg to [facility] and resend to [facility]

## 2025-04-02 NOTE — TELEPHONE ENCOUNTER
Patient is requesting refill on estradiol     [facility] - [place], [place] - [address] - P [phone] - F [phone] [[id number]]

## 2025-04-03 DIAGNOSIS — N95.2 VAGINAL ATROPHY: ICD-10-CM

## 2025-04-03 RX ORDER — ESTRADIOL 10 UG/1
10 TABLET, FILM COATED VAGINAL
Qty: 24 TABLET | Refills: 3 | Status: SHIPPED | OUTPATIENT
Start: 2025-04-03 | End: 2026-04-03

## 2025-04-04 ENCOUNTER — HOSPITAL ENCOUNTER (OUTPATIENT)
Dept: PHYSICAL THERAPY | Age: 70
Setting detail: THERAPIES SERIES
Discharge: HOME | End: 2025-04-04
Attending: ORTHOPAEDIC SURGERY
Payer: COMMERCIAL

## 2025-04-04 PROCEDURE — 97110 THERAPEUTIC EXERCISES: CPT | Performed by: PHYSICAL THERAPIST

## 2025-04-04 PROCEDURE — 97112 NEUROMUSCULAR REEDUCATION: CPT | Performed by: PHYSICAL THERAPIST

## 2025-04-04 RX ORDER — ONDANSETRON 4 MG/1
4 TABLET, FILM COATED ORAL EVERY 8 HOURS PRN
Qty: 15 TABLET | Refills: 1 | Status: SHIPPED | OUTPATIENT
Start: 2025-04-04

## 2025-04-04 NOTE — FLOWSHEET NOTE
[facility] - Outpatient Rehabilitation and Therapy: [address], [place] [address] office: [phone] fax: [phone]       Physical Therapy: TREATMENT/PROGRESS NOTE   Patient: Alfa Russo (69 y.o. female)   Examination Date: 2025   :  1955 MRN: [id number]   Visit #: 4   Insurance Allowable Auth Needed   30 []Yes    [x]No    Insurance: Payor: [facility] / Plan: [employer] EMPLOYEES / Product Type: *No Product type* /   Insurance ID: [id number] - (Commercial)  Secondary Insurance (if applicable):    Treatment Diagnosis:     ICD-10-CM    1. Right shoulder pain, unspecified chronicity  M25.511       2. Left shoulder pain, unspecified chronicity  M25.512          Medical Diagnosis:  Left shoulder pain, unspecified chronicity [M25.512]  Acromioclavicular sprain, right, initial encounter [S43.51XA]  Tendinitis of right rotator cuff [M75.81]  Degenerative joint disease of left acromioclavicular joint [M19.012]   Referring Physician: Malcolm Cadena MD  PCP: Konstantin Hinkle MD     Plan of care signed (Y/N):     Date of Patient follow up with Physician:      Plan of Care Report: NO  POC update due: (10 visits /OR AUTH LIMITS, whichever is less)  2025                                             Medical History:  Comorbidities:  Osteoporosis/Osteopenia  Osteoarthritis  Anxiety  Relevant Medical History:                                          Precautions/ Contra-indications:           Latex allergy:  NO  Pacemaker:    NO  Contraindications for Manipulation: None  Date of Surgery:   Other:     Red Flags:  None    Suicide Screening:   The patient did not verbalize a primary behavioral concern, suicidal ideation, suicidal intent, or demonstrate suicidal behaviors.    Preferred Language for Healthcare:   [x] English       [] other:    SUBJECTIVE EXAMINATION     Patient stated complaint: Pt reports some soreness.        Test used Initial score  3/19/25 2025

## 2025-04-08 ENCOUNTER — HOSPITAL ENCOUNTER (OUTPATIENT)
Dept: PHYSICAL THERAPY | Age: 70
Setting detail: THERAPIES SERIES
Discharge: HOME | End: 2025-04-08
Attending: ORTHOPAEDIC SURGERY
Payer: COMMERCIAL

## 2025-04-08 PROCEDURE — 97110 THERAPEUTIC EXERCISES: CPT | Performed by: PHYSICAL THERAPIST

## 2025-04-08 PROCEDURE — 97112 NEUROMUSCULAR REEDUCATION: CPT | Performed by: PHYSICAL THERAPIST

## 2025-04-08 NOTE — FLOWSHEET NOTE
[facility] - Outpatient Rehabilitation and Therapy: [address], [place] [address] office: [phone] fax: [phone]       Physical Therapy: TREATMENT/PROGRESS NOTE   Patient: Alfa Russo (69 y.o. female)   Examination Date: 2025   :  1955 MRN: [id number]   Visit #: 5   Insurance Allowable Auth Needed   30 []Yes    [x]No    Insurance: Payor: [facility] / Plan: [employer] EMPLOYEES / Product Type: *No Product type* /   Insurance ID: [id number] - (Commercial)  Secondary Insurance (if applicable):    Treatment Diagnosis:     ICD-10-CM    1. Right shoulder pain, unspecified chronicity  M25.511       2. Left shoulder pain, unspecified chronicity  M25.512          Medical Diagnosis:  Left shoulder pain, unspecified chronicity [M25.512]  Acromioclavicular sprain, right, initial encounter [S43.51XA]  Tendinitis of right rotator cuff [M75.81]  Degenerative joint disease of left acromioclavicular joint [M19.012]   Referring Physician: Malcolm Cadena MD  PCP: Konstantin Hinkle MD     Plan of care signed (Y/N):     Date of Patient follow up with Physician:      Plan of Care Report: NO  POC update due: (10 visits /OR AUTH LIMITS, whichever is less)  2025                                             Medical History:  Comorbidities:  Osteoporosis/Osteopenia  Osteoarthritis  Anxiety  Relevant Medical History:                                          Precautions/ Contra-indications:           Latex allergy:  NO  Pacemaker:    NO  Contraindications for Manipulation: None  Date of Surgery:   Other:     Red Flags:  None    Suicide Screening:   The patient did not verbalize a primary behavioral concern, suicidal ideation, suicidal intent, or demonstrate suicidal behaviors.    Preferred Language for Healthcare:   [x] English       [] other:    SUBJECTIVE EXAMINATION     Patient stated complaint: No issues right now.  Consistently doing exercises, may not get all reps in

## 2025-04-11 ENCOUNTER — APPOINTMENT (OUTPATIENT)
Dept: PHYSICAL THERAPY | Age: 70
End: 2025-04-11
Attending: ORTHOPAEDIC SURGERY
Payer: COMMERCIAL

## 2025-04-15 ENCOUNTER — HOSPITAL ENCOUNTER (OUTPATIENT)
Dept: PHYSICAL THERAPY | Age: 70
Setting detail: THERAPIES SERIES
Discharge: HOME | End: 2025-04-15
Attending: ORTHOPAEDIC SURGERY
Payer: COMMERCIAL

## 2025-04-15 PROCEDURE — 97112 NEUROMUSCULAR REEDUCATION: CPT | Performed by: PHYSICAL THERAPIST

## 2025-04-15 PROCEDURE — 97110 THERAPEUTIC EXERCISES: CPT | Performed by: PHYSICAL THERAPIST

## 2025-04-15 NOTE — FLOWSHEET NOTE
[facility] - Outpatient Rehabilitation and Therapy: [address], [place] [address] office: [phone] fax: [phone]       Physical Therapy: TREATMENT/PROGRESS NOTE   Patient: Alfa Russo (69 y.o. female)   Examination Date: 04/15/2025   :  1955 MRN: [id number]   Visit #: 6   Insurance Allowable Auth Needed   30 []Yes    [x]No    Insurance: Payor: [facility] / Plan: [employer] EMPLOYEES / Product Type: *No Product type* /   Insurance ID: [id number] - (Commercial)  Secondary Insurance (if applicable):    Treatment Diagnosis:     ICD-10-CM    1. Right shoulder pain, unspecified chronicity  M25.511       2. Left shoulder pain, unspecified chronicity  M25.512          Medical Diagnosis:  Left shoulder pain, unspecified chronicity [M25.512]  Acromioclavicular sprain, right, initial encounter [S43.51XA]  Tendinitis of right rotator cuff [M75.81]  Degenerative joint disease of left acromioclavicular joint [M19.012]   Referring Physician: Malcolm Cadena MD  PCP: Konstantin Hinkle MD     Plan of care signed (Y/N):     Date of Patient follow up with Physician:      Plan of Care Report: NO  POC update due: (10 visits /OR AUTH LIMITS, whichever is less)  2025                                             Medical History:  Comorbidities:  Osteoporosis/Osteopenia  Osteoarthritis  Anxiety  Relevant Medical History:                                          Precautions/ Contra-indications:           Latex allergy:  NO  Pacemaker:    NO  Contraindications for Manipulation: None  Date of Surgery:   Other:     Red Flags:  None    Suicide Screening:   The patient did not verbalize a primary behavioral concern, suicidal ideation, suicidal intent, or demonstrate suicidal behaviors.    Preferred Language for Healthcare:   [x] English       [] other:    SUBJECTIVE EXAMINATION     Patient stated complaint: Pt reports she was able to introduce some light dancing with some soreness, but

## 2025-04-16 ENCOUNTER — HOSPITAL ENCOUNTER (OUTPATIENT)
Dept: MAMMOGRAPHY | Age: 70
Discharge: HOME | End: 2025-04-16
Payer: COMMERCIAL

## 2025-04-16 VITALS — HEIGHT: 66 IN | BODY MASS INDEX: 22.5 KG/M2 | WEIGHT: 140 LBS

## 2025-04-16 DIAGNOSIS — Z12.31 VISIT FOR SCREENING MAMMOGRAM: ICD-10-CM

## 2025-04-16 PROCEDURE — 77063 BREAST TOMOSYNTHESIS BI: CPT

## 2025-04-18 ENCOUNTER — APPOINTMENT (OUTPATIENT)
Dept: PHYSICAL THERAPY | Age: 70
End: 2025-04-18
Attending: ORTHOPAEDIC SURGERY
Payer: COMMERCIAL

## 2025-04-22 ENCOUNTER — HOSPITAL ENCOUNTER (OUTPATIENT)
Dept: PHYSICAL THERAPY | Age: 70
Setting detail: THERAPIES SERIES
Discharge: HOME | End: 2025-04-22
Attending: ORTHOPAEDIC SURGERY
Payer: COMMERCIAL

## 2025-04-22 PROCEDURE — 97110 THERAPEUTIC EXERCISES: CPT | Performed by: PHYSICAL THERAPIST

## 2025-04-22 PROCEDURE — 97112 NEUROMUSCULAR REEDUCATION: CPT | Performed by: PHYSICAL THERAPIST

## 2025-04-22 NOTE — PLAN OF CARE
PRE's     Flexion     Abduction     External Rotation - Verbal, tactile and visual cues 4/22 3x10 4#  ^ 4/22   Internal Rotation     Shrugs w/ bkwd roll  3x10    EXT     Reverse Flys     Serratus Wall walk red loop 10x Added 3/26   Biceps     Triceps     Retraction          Cable Column/Theraband     External Rotation  3x10 Blue TB ^ 4/22   Internal Rotation 3x10 Blue TB ^ 4/22   BIC     TRIC     No moneys 3x10 GTB (10 pulses on last rep)              Manual interventions                     Modalities:    No modalities applied this session    Education/Home Exercise Program: Patient HEP program created electronically.  Refer to [employer] access code: [email or web address]  4/1 spent time educating patient on the importance of compliance with HEP  4/4 Updated HEP   ASSESSMENT     Today's Assessment: POC/PROGRESS UPDATE: Pt. continues to present with functional deficits in strength symmetry and endurance of strength  limiting ability with managing bed mobility, reaching overhead, carrying items, lifting items, and pushing or pulling activity .  During therapy this date, patient required verbal cueing and tactile cueing for exercise progression and improving proper muscle recruitment and activation/motor control patterns. Patient will continue to benefit from ongoing evaluation and advanced clinical decision from a Physical Therapist to improve muscle strength, neuromuscular control, and endurance to safely return to PLOF without symptoms or restrictions. 4/22    Medical Necessity Documentation:  I certify that this patient meets the below criteria necessary for medical necessity for care and/or justification of therapy services:  The patient has functional impairments and/or activity limitations and would benefit from continued outpatient therapy services to address the deficits outlined in the patients goals    Return to Play: NA    Prognosis for POC: [x] Good [] Fair  [] Poor    Patient requires continued skilled

## 2025-04-25 ENCOUNTER — APPOINTMENT (OUTPATIENT)
Dept: PHYSICAL THERAPY | Age: 70
End: 2025-04-25
Attending: ORTHOPAEDIC SURGERY
Payer: COMMERCIAL

## 2025-04-29 ENCOUNTER — HOSPITAL ENCOUNTER (OUTPATIENT)
Dept: PHYSICAL THERAPY | Age: 70
Setting detail: THERAPIES SERIES
Discharge: HOME | End: 2025-04-29
Attending: ORTHOPAEDIC SURGERY
Payer: COMMERCIAL

## 2025-04-29 PROCEDURE — 97112 NEUROMUSCULAR REEDUCATION: CPT | Performed by: PHYSICAL THERAPIST

## 2025-04-29 PROCEDURE — 97110 THERAPEUTIC EXERCISES: CPT | Performed by: PHYSICAL THERAPIST

## 2025-04-29 NOTE — FLOWSHEET NOTE
[facility] - Outpatient Rehabilitation and Therapy: [address], [place] [address] office: [phone] fax: [phone]       Physical Therapy: TREATMENT/PROGRESS NOTE   Patient: Alfa Russo (69 y.o. female)   Examination Date: 2025   :  1955 MRN: [id number]   Visit #: 8   Insurance Allowable Auth Needed   30 []Yes    [x]No    Insurance: Payor: [facility] / Plan: [facility] EMPLOYEES / Product Type: *No Product type* /   Insurance ID: [id number] - (Commercial)  Secondary Insurance (if applicable):    Treatment Diagnosis:     ICD-10-CM    1. Right shoulder pain, unspecified chronicity  M25.511       2. Left shoulder pain, unspecified chronicity  M25.512          Medical Diagnosis:  Left shoulder pain, unspecified chronicity [M25.512]  Acromioclavicular sprain, right, initial encounter [S43.51XA]  Tendinitis of right rotator cuff [M75.81]  Degenerative joint disease of left acromioclavicular joint [M19.012]   Referring Physician: Malcolm Cadena MD  PCP: Konstantin Hinkle MD     Plan of care signed (Y/N): Y    Date of Patient follow up with Physician:      Plan of Care Report: NO  POC update due: (10 visits /OR AUTH LIMITS, whichever is less)  25                                            Medical History:  Comorbidities:  Osteoporosis/Osteopenia  Osteoarthritis  Anxiety  Relevant Medical History:                                          Precautions/ Contra-indications:           Latex allergy:  NO  Pacemaker:    NO  Contraindications for Manipulation: None  Date of Surgery:   Other:     Red Flags:  None    Suicide Screening:   The patient did not verbalize a primary behavioral concern, suicidal ideation, suicidal intent, or demonstrate suicidal behaviors.    Preferred Language for Healthcare:   [x] English       [] other:    SUBJECTIVE EXAMINATION     Patient stated complaint: Pt was doing her exercises regularly.  Soreness after dancing.  She does not like

## 2025-05-05 ENCOUNTER — APPOINTMENT (OUTPATIENT)
Dept: PHYSICAL THERAPY | Age: 70
End: 2025-05-05
Attending: ORTHOPAEDIC SURGERY
Payer: COMMERCIAL

## 2025-05-08 ENCOUNTER — APPOINTMENT (OUTPATIENT)
Dept: PHYSICAL THERAPY | Age: 70
End: 2025-05-08
Attending: ORTHOPAEDIC SURGERY
Payer: COMMERCIAL

## 2025-05-12 ENCOUNTER — HOSPITAL ENCOUNTER (OUTPATIENT)
Dept: PHYSICAL THERAPY | Age: 70
Setting detail: THERAPIES SERIES
Discharge: HOME | End: 2025-05-12
Attending: ORTHOPAEDIC SURGERY
Payer: COMMERCIAL

## 2025-05-12 PROCEDURE — 97110 THERAPEUTIC EXERCISES: CPT | Performed by: PHYSICAL THERAPIST

## 2025-05-12 PROCEDURE — 97530 THERAPEUTIC ACTIVITIES: CPT | Performed by: PHYSICAL THERAPIST

## 2025-05-12 NOTE — FLOWSHEET NOTE
[facility] - Outpatient Rehabilitation and Therapy: [address], [place] [address] office: [phone] fax: [phone]       Physical Therapy: TREATMENT/PROGRESS NOTE   Patient: Alfa Russo (69 y.o. female)   Examination Date: 2025   :  1955 MRN: [id number]   Visit #: 9   Insurance Allowable Auth Needed   30 []Yes    [x]No    Insurance: Payor: [facility] / Plan: [employer] [facility] [employer] EMPLOYEES / Product Type: *No Product type* /   Insurance ID: [id number] - (Commercial)  Secondary Insurance (if applicable):    Treatment Diagnosis:     ICD-10-CM    1. Right shoulder pain, unspecified chronicity  M25.511       2. Left shoulder pain, unspecified chronicity  M25.512          Medical Diagnosis:  Left shoulder pain, unspecified chronicity [M25.512]  Acromioclavicular sprain, right, initial encounter [S43.51XA]  Tendinitis of right rotator cuff [M75.81]  Degenerative joint disease of left acromioclavicular joint [M19.012]   Referring Physician: Malcolm Cadena MD  PCP: Konstantin Hinkle MD     Plan of care signed (Y/N): Y    Date of Patient follow up with Physician:      Plan of Care Report: NO  POC update due: (10 visits /OR AUTH LIMITS, whichever is less)  25                                            Medical History:  Comorbidities:  Osteoporosis/Osteopenia  Osteoarthritis  Anxiety  Relevant Medical History:                                          Precautions/ Contra-indications:           Latex allergy:  NO  Pacemaker:    NO  Contraindications for Manipulation: None  Date of Surgery:   Other:     Red Flags:  None    Suicide Screening:   The patient did not verbalize a primary behavioral concern, suicidal ideation, suicidal intent, or demonstrate suicidal behaviors.    Preferred Language for Healthcare:   [x] English       [] other:    SUBJECTIVE EXAMINATION     Patient stated complaint: Pt reports she is doing better. She does have soreness the next day after doing

## 2025-05-15 ENCOUNTER — APPOINTMENT (OUTPATIENT)
Dept: PHYSICAL THERAPY | Age: 70
End: 2025-05-15
Attending: ORTHOPAEDIC SURGERY
Payer: COMMERCIAL

## 2025-05-19 ENCOUNTER — HOSPITAL ENCOUNTER (OUTPATIENT)
Dept: GENERAL RADIOLOGY | Age: 70
Discharge: HOME | End: 2025-05-19
Attending: OBSTETRICS & GYNECOLOGY
Payer: COMMERCIAL

## 2025-05-19 DIAGNOSIS — M81.8 OTHER OSTEOPOROSIS WITHOUT CURRENT PATHOLOGICAL FRACTURE: ICD-10-CM

## 2025-05-19 PROCEDURE — 77080 DXA BONE DENSITY AXIAL: CPT

## 2025-05-20 ENCOUNTER — APPOINTMENT (OUTPATIENT)
Dept: PHYSICAL THERAPY | Age: 70
End: 2025-05-20
Attending: ORTHOPAEDIC SURGERY
Payer: COMMERCIAL

## 2025-05-21 ENCOUNTER — RESULTS FOLLOW-UP (OUTPATIENT)
Dept: GYNECOLOGY | Age: 70
End: 2025-05-21

## 2025-05-22 ENCOUNTER — APPOINTMENT (OUTPATIENT)
Dept: PHYSICAL THERAPY | Age: 70
End: 2025-05-22
Attending: ORTHOPAEDIC SURGERY
Payer: COMMERCIAL

## 2025-05-22 ENCOUNTER — TELEPHONE (OUTPATIENT)
Dept: GYNECOLOGY | Age: 70
End: 2025-05-22

## 2025-05-22 NOTE — TELEPHONE ENCOUNTER
Patient call requesting refill on Alendronate 70MG    Patient can be reached at [phone]       [facility] - [address] - P [phone] - F [phone] [[id number]]

## 2025-05-23 DIAGNOSIS — G47.00 INSOMNIA, UNSPECIFIED TYPE: ICD-10-CM

## 2025-05-23 RX ORDER — ESZOPICLONE 2 MG/1
2 TABLET, FILM COATED ORAL NIGHTLY
Qty: 30 TABLET | Refills: 2 | Status: SHIPPED | OUTPATIENT
Start: 2025-05-23 | End: 2025-08-21

## 2025-05-23 RX ORDER — ALENDRONATE SODIUM 70 MG/1
70 TABLET ORAL
Qty: 12 TABLET | Refills: 3 | Status: SHIPPED | OUTPATIENT
Start: 2025-05-23

## 2025-05-23 NOTE — TELEPHONE ENCOUNTER
Medication:   Requested Prescriptions     Pending Prescriptions Disp Refills    eszopiclone (LUNESTA) 2 MG TABS 30 tablet 2     Sig: Take 1 tablet by mouth nightly for 90 days. Max Daily Amount: 2 mg        Last Filled:  1/22/25    Patient Phone Number: [phone] (home)     Last appt: 2/28/2025   Next appt: Visit date not found    Last OARRS:        No data to display

## 2025-05-26 ENCOUNTER — APPOINTMENT (OUTPATIENT)
Dept: PHYSICAL THERAPY | Age: 70
End: 2025-05-26
Attending: ORTHOPAEDIC SURGERY
Payer: COMMERCIAL

## 2025-05-28 ENCOUNTER — APPOINTMENT (OUTPATIENT)
Dept: PHYSICAL THERAPY | Age: 70
End: 2025-05-28
Attending: ORTHOPAEDIC SURGERY
Payer: COMMERCIAL

## 2025-05-29 ENCOUNTER — OFFICE VISIT (OUTPATIENT)
Dept: GYNECOLOGY | Age: 70
End: 2025-05-29
Payer: COMMERCIAL

## 2025-05-29 VITALS
BODY MASS INDEX: 23.53 KG/M2 | WEIGHT: 145.8 LBS | OXYGEN SATURATION: 97 % | RESPIRATION RATE: 17 BRPM | SYSTOLIC BLOOD PRESSURE: 114 MMHG | HEART RATE: 81 BPM | DIASTOLIC BLOOD PRESSURE: 72 MMHG

## 2025-05-29 DIAGNOSIS — D21.9 FIBROID: ICD-10-CM

## 2025-05-29 DIAGNOSIS — Q51.28 UTERUS DIDELPHUS: ICD-10-CM

## 2025-05-29 DIAGNOSIS — Z01.419 WELL WOMAN EXAM WITH ROUTINE GYNECOLOGICAL EXAM: Primary | ICD-10-CM

## 2025-05-29 PROCEDURE — 99397 PER PM REEVAL EST PAT 65+ YR: CPT | Performed by: OBSTETRICS & GYNECOLOGY

## 2025-06-06 ENCOUNTER — APPOINTMENT (OUTPATIENT)
Age: 70
End: 2025-06-06
Payer: COMMERCIAL

## 2025-06-06 ENCOUNTER — HOSPITAL ENCOUNTER (EMERGENCY)
Age: 70
Discharge: HOME | End: 2025-06-06
Attending: EMERGENCY MEDICINE
Payer: COMMERCIAL

## 2025-06-06 ENCOUNTER — RESULTS FOLLOW-UP (OUTPATIENT)
Dept: GYNECOLOGY | Age: 70
End: 2025-06-06

## 2025-06-06 VITALS
DIASTOLIC BLOOD PRESSURE: 66 MMHG | SYSTOLIC BLOOD PRESSURE: 153 MMHG | BODY MASS INDEX: 23.53 KG/M2 | HEART RATE: 64 BPM | OXYGEN SATURATION: 100 % | HEIGHT: 66 IN | TEMPERATURE: 97.7 F | RESPIRATION RATE: 18 BRPM

## 2025-06-06 DIAGNOSIS — L03.032 CELLULITIS OF TOE OF LEFT FOOT: Primary | ICD-10-CM

## 2025-06-06 PROCEDURE — 99283 EMERGENCY DEPT VISIT LOW MDM: CPT

## 2025-06-06 PROCEDURE — 73630 X-RAY EXAM OF FOOT: CPT

## 2025-06-06 PROCEDURE — 6370000000 HC RX 637 (ALT 250 FOR IP): Performed by: EMERGENCY MEDICINE

## 2025-06-06 RX ORDER — IBUPROFEN 600 MG/1
600 TABLET, FILM COATED ORAL
Status: DISCONTINUED | OUTPATIENT
Start: 2025-06-06 | End: 2025-06-07 | Stop reason: HOSPADM

## 2025-06-06 RX ORDER — CEPHALEXIN 500 MG/1
500 CAPSULE ORAL 4 TIMES DAILY
Qty: 28 CAPSULE | Refills: 0 | Status: SHIPPED | OUTPATIENT
Start: 2025-06-06 | End: 2025-06-13

## 2025-06-06 RX ORDER — CEPHALEXIN 500 MG/1
500 CAPSULE ORAL 4 TIMES DAILY
Qty: 28 CAPSULE | Refills: 0 | Status: SHIPPED | OUTPATIENT
Start: 2025-06-06 | End: 2025-06-06

## 2025-06-06 RX ORDER — CEPHALEXIN 500 MG/1
500 CAPSULE ORAL ONCE
Status: COMPLETED | OUTPATIENT
Start: 2025-06-06 | End: 2025-06-06

## 2025-06-06 RX ADMIN — CEPHALEXIN 500 MG: 500 CAPSULE ORAL at 22:30

## 2025-06-06 ASSESSMENT — PAIN DESCRIPTION - PAIN TYPE: TYPE: ACUTE PAIN

## 2025-06-06 ASSESSMENT — PAIN - FUNCTIONAL ASSESSMENT
PAIN_FUNCTIONAL_ASSESSMENT: 0-10
PAIN_FUNCTIONAL_ASSESSMENT: PREVENTS OR INTERFERES SOME ACTIVE ACTIVITIES AND ADLS

## 2025-06-06 ASSESSMENT — PAIN SCALES - GENERAL: PAINLEVEL_OUTOF10: 8

## 2025-06-07 NOTE — ED PROVIDER NOTES
EMERGENCY MEDICINE ATTENDING NOTE  Per Fuentes Jr., DO, FACEP, FAAEM        CHIEF COMPLAINT  Chief Complaint   Patient presents with    Foot Pain     Pt c/o left foot, specifically 2nd to pain. Unsure of any recent injury.        HISTORY OF PRESENT ILLNESS  Alfa Russo is a 69 y.o. female who presents to the ED for evaluation of pain to the second toe of the left foot.  Noticed it today.  Did notice that there is some redness to the area as well.  Denies any actual injury to it.  Denies any cuts or wounds to it.  No numbness or tingling.  Has not had any fevers or chills.  Denies this ever happening in the past and has no other concerns.    Nursing/triage notes reviewed.  No other complaints, modifying factors or associated symptoms.     REVIEW OF SYSTEMS:  All systems are reviewed and are negative unless noted in the HPI.    PAST MEDICAL HISTORY  Past Medical History:   Diagnosis Date    Acne rosacea 2011    Anxiety     Colon polyps     Diabetes mellitus (HCC)     pre-diabetic (diet controlled)    Diabetic eye exam (HCC) 4.9.15    [facility]    Diabetic eye exam (HCC) 16    [facility]     Diverticulosis 2013    Dry eyes     Gastroesophageal reflux disease 5/15/2018    Hyperlipidemia     Insufficiency of tear film of both eyes 2016    Mild episode of recurrent major depressive disorder 2019    Nuclear sclerosis of both eyes 2016    Pneumothorax     Pregnancy     grava - 2, para - 2, uterine rupture with stillbirth    Uterus didelphus        SURGICAL HISTORY  Past Surgical History:   Procedure Laterality Date     SECTION      2    COLONOSCOPY      COLONOSCOPY N/A 2020    COLONOSCOPY POLYPECTOMY SNARE/COLD BIOPSY performed by Ryne Wise MD at [facility] ENDOSCOPY    VAGINA SURGERY      septum removed    WISDOM TOOTH EXTRACTION      x 4       FAMILY HISTORY  Family History   Problem Relation Age of Onset    Stroke Mother     Glaucoma

## 2025-06-09 ENCOUNTER — OFFICE VISIT (OUTPATIENT)
Dept: FAMILY MEDICINE CLINIC | Age: 70
End: 2025-06-09
Payer: COMMERCIAL

## 2025-06-09 ENCOUNTER — TELEPHONE (OUTPATIENT)
Dept: FAMILY MEDICINE CLINIC | Age: 70
End: 2025-06-09

## 2025-06-09 VITALS
TEMPERATURE: 97.6 F | BODY MASS INDEX: 23.37 KG/M2 | WEIGHT: 145.4 LBS | OXYGEN SATURATION: 99 % | SYSTOLIC BLOOD PRESSURE: 126 MMHG | HEIGHT: 66 IN | DIASTOLIC BLOOD PRESSURE: 60 MMHG | HEART RATE: 74 BPM

## 2025-06-09 DIAGNOSIS — Z00.00 WELL ADULT EXAM: Primary | ICD-10-CM

## 2025-06-09 PROCEDURE — 99397 PER PM REEVAL EST PAT 65+ YR: CPT | Performed by: FAMILY MEDICINE

## 2025-06-09 RX ORDER — MELOXICAM 7.5 MG/1
TABLET ORAL
Qty: 60 TABLET | Refills: 2 | Status: SHIPPED | OUTPATIENT
Start: 2025-06-09 | End: 2025-06-10 | Stop reason: SDUPTHER

## 2025-06-09 RX ORDER — MELOXICAM 7.5 MG/1
TABLET ORAL
Qty: 60 TABLET | Refills: 2 | Status: CANCELLED | OUTPATIENT
Start: 2025-06-09

## 2025-06-09 SDOH — ECONOMIC STABILITY: FOOD INSECURITY: WITHIN THE PAST 12 MONTHS, THE FOOD YOU BOUGHT JUST DIDN'T LAST AND YOU DIDN'T HAVE MONEY TO GET MORE.: NEVER TRUE

## 2025-06-09 SDOH — ECONOMIC STABILITY: FOOD INSECURITY: WITHIN THE PAST 12 MONTHS, YOU WORRIED THAT YOUR FOOD WOULD RUN OUT BEFORE YOU GOT MONEY TO BUY MORE.: NEVER TRUE

## 2025-06-09 ASSESSMENT — PATIENT HEALTH QUESTIONNAIRE - PHQ9
1. LITTLE INTEREST OR PLEASURE IN DOING THINGS: NOT AT ALL
SUM OF ALL RESPONSES TO PHQ QUESTIONS 1-9: 0
SUM OF ALL RESPONSES TO PHQ QUESTIONS 1-9: 0
2. FEELING DOWN, DEPRESSED OR HOPELESS: NOT AT ALL
SUM OF ALL RESPONSES TO PHQ QUESTIONS 1-9: 0
SUM OF ALL RESPONSES TO PHQ QUESTIONS 1-9: 0

## 2025-06-09 ASSESSMENT — ENCOUNTER SYMPTOMS: RESPIRATORY NEGATIVE: 1

## 2025-06-09 NOTE — PROGRESS NOTES
Pulmonary:      Effort: Pulmonary effort is normal.      Breath sounds: Normal breath sounds. No wheezing.   Abdominal:      General: There is no distension.      Palpations: Abdomen is soft. There is no hepatomegaly or splenomegaly.      Tenderness: There is no abdominal tenderness.   Musculoskeletal:      Cervical back: Normal range of motion and neck supple.   Lymphadenopathy:      Head:      Right side of head: No submandibular adenopathy.      Left side of head: No submandibular adenopathy.      Cervical: No cervical adenopathy.      Upper Body:      Right upper body: No supraclavicular adenopathy.      Left upper body: No supraclavicular adenopathy.   Skin:     General: Skin is warm and dry.      Capillary Refill: Capillary refill takes less than 2 seconds.      Nails: There is no clubbing.   Neurological:      Mental Status: She is alert and oriented to person, place, and time.      Cranial Nerves: No cranial nerve deficit.      Deep Tendon Reflexes:      Reflex Scores:       Bicep reflexes are 2+ on the right side and 2+ on the left side.       Patellar reflexes are 2+ on the right side and 2+ on the left side.  Psychiatric:         Behavior: Behavior normal.              An electronic signature was used to authenticate this note.    --Konstantin Hinkle MD

## 2025-06-09 NOTE — TELEPHONE ENCOUNTER
Melinda called from [facility] to get clarification on this RX. She needs to know what is prescribed for and when it should be taken.

## 2025-06-10 RX ORDER — MELOXICAM 7.5 MG/1
TABLET ORAL
Qty: 60 TABLET | Refills: 2 | Status: SHIPPED | OUTPATIENT
Start: 2025-06-10

## 2025-06-12 ENCOUNTER — HOSPITAL ENCOUNTER (OUTPATIENT)
Dept: PHYSICAL THERAPY | Age: 70
Setting detail: THERAPIES SERIES
Discharge: HOME | End: 2025-06-12
Attending: ORTHOPAEDIC SURGERY
Payer: COMMERCIAL

## 2025-06-12 PROCEDURE — 97530 THERAPEUTIC ACTIVITIES: CPT | Performed by: PHYSICAL THERAPIST

## 2025-06-12 PROCEDURE — 97110 THERAPEUTIC EXERCISES: CPT | Performed by: PHYSICAL THERAPIST

## 2025-06-12 NOTE — THERAPY DISCHARGE
[facility] - [facility]: [address], [place] [address] office: [phone] fax: [phone]      Physical Therapy Discharge Summary    Dear Malcolm Cadena MD   ,    We had the pleasure of treating the following patient for physical therapy services at [facility].  A summary of our findings can be found in the discharge summary below.  If you have any questions or concerns regarding these findings, please do not hesitate to contact me at the office phone number checked above.  Thank you for the referral.         Total Visits: 10     Recommendation:   [] Hold PT, pending MD visit   [x] Discharge to [facility]. Follow up with PT or MD PRN.     Reason for Discharge:  Patient should continue to improve in reasonable time if they continue HEP          Physical Therapy: TREATMENT/PROGRESS NOTE   Patient: Alfa Russo (69 y.o. female)   Examination Date: 2025   :  1955 MRN: [id number]   Visit #: 10   Insurance Allowable Auth Needed   30 []Yes    [x]No    Insurance: Payor: [facility] / Plan: [employer] / Product Type: *No Product type* /   Insurance ID: [id number] - (Commercial)  Secondary Insurance (if applicable):    Treatment Diagnosis:     ICD-10-CM    1. Right shoulder pain, unspecified chronicity  M25.511       2. Left shoulder pain, unspecified chronicity  M25.512          Medical Diagnosis:  Left shoulder pain, unspecified chronicity [M25.512]  Acromioclavicular sprain, right, initial encounter [S43.51XA]  Tendinitis of right rotator cuff [M75.81]  Degenerative joint disease of left acromioclavicular joint [M19.012]   Referring Physician: Maloclm Cadena MD  PCP: Konstantin Hinkle MD     Plan of care signed (Y/N): Y    Date of Patient follow up with Physician:      Plan of Care Report: YES, Date Range for this report:  to   POC update due: (10 visits /OR AUTH LIMITS, whichever is less)

## 2025-06-17 ENCOUNTER — HOSPITAL ENCOUNTER (OUTPATIENT)
Age: 70
Discharge: HOME | End: 2025-06-17
Attending: OBSTETRICS & GYNECOLOGY
Payer: COMMERCIAL

## 2025-06-17 DIAGNOSIS — D21.9 FIBROID: ICD-10-CM

## 2025-06-17 DIAGNOSIS — Q51.28 UTERUS DIDELPHUS: ICD-10-CM

## 2025-06-17 PROCEDURE — 76856 US EXAM PELVIC COMPLETE: CPT

## 2025-07-02 LAB — DIABETIC RETINOPATHY: NEGATIVE

## 2025-08-15 ENCOUNTER — OFFICE VISIT (OUTPATIENT)
Dept: ORTHOPEDIC SURGERY | Age: 70
End: 2025-08-15

## 2025-08-15 VITALS — WEIGHT: 145 LBS | BODY MASS INDEX: 23.3 KG/M2 | HEIGHT: 66 IN

## 2025-08-15 DIAGNOSIS — M25.511 ARTHRALGIA OF RIGHT ACROMIOCLAVICULAR JOINT: ICD-10-CM

## 2025-08-15 DIAGNOSIS — M19.012 DEGENERATIVE JOINT DISEASE OF LEFT ACROMIOCLAVICULAR JOINT: ICD-10-CM

## 2025-08-15 DIAGNOSIS — S43.51XA ACROMIOCLAVICULAR SPRAIN, RIGHT, INITIAL ENCOUNTER: Primary | ICD-10-CM

## 2025-08-15 RX ORDER — ROPIVACAINE HYDROCHLORIDE 5 MG/ML
30 INJECTION, SOLUTION EPIDURAL; INFILTRATION; PERINEURAL ONCE
Status: COMPLETED | OUTPATIENT
Start: 2025-08-15 | End: 2025-08-15

## 2025-08-15 RX ORDER — TRIAMCINOLONE ACETONIDE 40 MG/ML
40 INJECTION, SUSPENSION INTRA-ARTICULAR; INTRAMUSCULAR ONCE
Status: COMPLETED | OUTPATIENT
Start: 2025-08-15 | End: 2025-08-15

## 2025-08-15 RX ADMIN — TRIAMCINOLONE ACETONIDE 40 MG: 40 INJECTION, SUSPENSION INTRA-ARTICULAR; INTRAMUSCULAR at 13:09

## 2025-08-15 RX ADMIN — ROPIVACAINE HYDROCHLORIDE 30 ML: 5 INJECTION, SOLUTION EPIDURAL; INFILTRATION; PERINEURAL at 13:08

## 2025-08-27 DIAGNOSIS — E78.2 MIXED HYPERLIPIDEMIA: ICD-10-CM

## 2025-08-27 RX ORDER — OMEGA-3-ACID ETHYL ESTERS 1 G/1
CAPSULE, LIQUID FILLED ORAL
Qty: 360 CAPSULE | Refills: 3 | Status: SHIPPED | OUTPATIENT
Start: 2025-08-27

## 2025-09-03 ENCOUNTER — HOSPITAL ENCOUNTER (OUTPATIENT)
Age: 70
Setting detail: OUTPATIENT SURGERY
Discharge: HOME | End: 2025-09-03
Attending: INTERNAL MEDICINE | Admitting: INTERNAL MEDICINE
Payer: COMMERCIAL

## 2025-09-03 ENCOUNTER — ANESTHESIA EVENT (OUTPATIENT)
Dept: ENDOSCOPY | Age: 70
End: 2025-09-03
Payer: COMMERCIAL

## 2025-09-03 ENCOUNTER — ANESTHESIA (OUTPATIENT)
Dept: ENDOSCOPY | Age: 70
End: 2025-09-03
Payer: COMMERCIAL

## 2025-09-03 VITALS
BODY MASS INDEX: 22.5 KG/M2 | HEART RATE: 62 BPM | WEIGHT: 140 LBS | HEIGHT: 66 IN | SYSTOLIC BLOOD PRESSURE: 136 MMHG | TEMPERATURE: 96.8 F | RESPIRATION RATE: 16 BRPM | DIASTOLIC BLOOD PRESSURE: 72 MMHG | OXYGEN SATURATION: 100 %

## 2025-09-03 PROCEDURE — 2709999900 HC NON-CHARGEABLE SUPPLY: Performed by: INTERNAL MEDICINE

## 2025-09-03 PROCEDURE — 6360000002 HC RX W HCPCS

## 2025-09-03 PROCEDURE — 3609027000 HC COLONOSCOPY: Performed by: INTERNAL MEDICINE

## 2025-09-03 PROCEDURE — 3700000000 HC ANESTHESIA ATTENDED CARE: Performed by: INTERNAL MEDICINE

## 2025-09-03 PROCEDURE — 3700000001 HC ADD 15 MINUTES (ANESTHESIA): Performed by: INTERNAL MEDICINE

## 2025-09-03 PROCEDURE — 7100000011 HC PHASE II RECOVERY - ADDTL 15 MIN: Performed by: INTERNAL MEDICINE

## 2025-09-03 PROCEDURE — 7100000010 HC PHASE II RECOVERY - FIRST 15 MIN: Performed by: INTERNAL MEDICINE

## 2025-09-03 PROCEDURE — 2580000003 HC RX 258: Performed by: ANESTHESIOLOGY

## 2025-09-03 RX ORDER — LABETALOL HYDROCHLORIDE 5 MG/ML
5 INJECTION, SOLUTION INTRAVENOUS EVERY 10 MIN PRN
Status: CANCELLED | OUTPATIENT
Start: 2025-09-03

## 2025-09-03 RX ORDER — LIDOCAINE HYDROCHLORIDE 20 MG/ML
INJECTION, SOLUTION EPIDURAL; INFILTRATION; INTRACAUDAL; PERINEURAL
Status: DISCONTINUED | OUTPATIENT
Start: 2025-09-03 | End: 2025-09-03 | Stop reason: SDUPTHER

## 2025-09-03 RX ORDER — PROPOFOL 10 MG/ML
INJECTION, EMULSION INTRAVENOUS
Status: DISCONTINUED | OUTPATIENT
Start: 2025-09-03 | End: 2025-09-03 | Stop reason: SDUPTHER

## 2025-09-03 RX ORDER — OXYCODONE HYDROCHLORIDE 5 MG/1
5 TABLET ORAL PRN
Refills: 0 | Status: CANCELLED | OUTPATIENT
Start: 2025-09-03 | End: 2025-09-03

## 2025-09-03 RX ORDER — PROCHLORPERAZINE EDISYLATE 5 MG/ML
5 INJECTION INTRAMUSCULAR; INTRAVENOUS
Status: CANCELLED | OUTPATIENT
Start: 2025-09-03

## 2025-09-03 RX ORDER — FENTANYL CITRATE 50 UG/ML
25 INJECTION, SOLUTION INTRAMUSCULAR; INTRAVENOUS EVERY 5 MIN PRN
Refills: 0 | Status: CANCELLED | OUTPATIENT
Start: 2025-09-03

## 2025-09-03 RX ORDER — SODIUM CHLORIDE 0.9 % (FLUSH) 0.9 %
5-40 SYRINGE (ML) INJECTION EVERY 12 HOURS SCHEDULED
Status: CANCELLED | OUTPATIENT
Start: 2025-09-03

## 2025-09-03 RX ORDER — DIPHENHYDRAMINE HYDROCHLORIDE 50 MG/ML
12.5 INJECTION, SOLUTION INTRAMUSCULAR; INTRAVENOUS
Status: CANCELLED | OUTPATIENT
Start: 2025-09-03

## 2025-09-03 RX ORDER — SODIUM CHLORIDE 9 MG/ML
INJECTION, SOLUTION INTRAVENOUS PRN
Status: CANCELLED | OUTPATIENT
Start: 2025-09-03

## 2025-09-03 RX ORDER — ONDANSETRON 2 MG/ML
4 INJECTION INTRAMUSCULAR; INTRAVENOUS
Status: CANCELLED | OUTPATIENT
Start: 2025-09-03

## 2025-09-03 RX ORDER — SODIUM CHLORIDE 0.9 % (FLUSH) 0.9 %
5-40 SYRINGE (ML) INJECTION PRN
Status: CANCELLED | OUTPATIENT
Start: 2025-09-03

## 2025-09-03 RX ORDER — OXYCODONE HYDROCHLORIDE 5 MG/1
10 TABLET ORAL PRN
Refills: 0 | Status: CANCELLED | OUTPATIENT
Start: 2025-09-03 | End: 2025-09-03

## 2025-09-03 RX ORDER — SODIUM CHLORIDE 9 MG/ML
INJECTION, SOLUTION INTRAVENOUS CONTINUOUS
Status: DISCONTINUED | OUTPATIENT
Start: 2025-09-03 | End: 2025-09-03 | Stop reason: HOSPADM

## 2025-09-03 RX ADMIN — PROPOFOL 10 MG: 10 INJECTION, EMULSION INTRAVENOUS at 11:33

## 2025-09-03 RX ADMIN — PROPOFOL 50 MG: 10 INJECTION, EMULSION INTRAVENOUS at 11:25

## 2025-09-03 RX ADMIN — PROPOFOL 120 MCG/KG/MIN: 10 INJECTION, EMULSION INTRAVENOUS at 11:25

## 2025-09-03 RX ADMIN — SODIUM CHLORIDE: 9 INJECTION, SOLUTION INTRAVENOUS at 11:22

## 2025-09-03 RX ADMIN — LIDOCAINE HYDROCHLORIDE 100 MG: 20 INJECTION, SOLUTION EPIDURAL; INFILTRATION; INTRACAUDAL; PERINEURAL at 11:25

## 2025-09-03 ASSESSMENT — PAIN - FUNCTIONAL ASSESSMENT
PAIN_FUNCTIONAL_ASSESSMENT: 0-10

## (undated) DEVICE — 60 ML SYRINGE,REGULAR TIP: Brand: MONOJECT

## (undated) DEVICE — ADAPTER AIR/WATER CHANNEL CLEANING OLYMPUS COMPATIBLE NS

## (undated) DEVICE — BW-412T DISP COMBO CLEANING BRUSH: Brand: SINGLE USE COMBINATION CLEANING BRUSH

## (undated) DEVICE — BRUSH CLN WRK L220CM CHN DIA2-4.2MM PLAS OPN STIFFER

## (undated) DEVICE — SOLUTION IRRG STRL H2O 500 ML BTL 16/CA

## (undated) DEVICE — SET VLV 3 PC AWS DISPOSABLE GRDIAN SCOPEVALET

## (undated) DEVICE — PROCEDURE KIT ENDOSCP CUST

## (undated) DEVICE — SOLUTION IV IRRIG WATER 500ML POUR BRL ST 2F7113

## (undated) DEVICE — TRAP SPEC RETRV CLR PLAS POLYP IN LN SUCT QUIK CTCH

## (undated) DEVICE — VALVE SUCTION AIR H2O SET ORCA POD + DISP

## (undated) DEVICE — TUBING IRRIG COMPATIBLE W ERBE MEDIVATOR PMP HYDR

## (undated) DEVICE — CAP WATER BTL AIR TBNG L 16 IN CO2 TBNG L 48 IN ENDOSCP

## (undated) DEVICE — ENDOSCOPIC KIT 6X3/16 FT COLON W/ 1.1 OZ 2 GWN W/O BRSH

## (undated) DEVICE — Device: Brand: DISPOSABLE ELECTROSURGICAL SNARE